# Patient Record
Sex: FEMALE | Race: WHITE | NOT HISPANIC OR LATINO | Employment: FULL TIME | URBAN - METROPOLITAN AREA
[De-identification: names, ages, dates, MRNs, and addresses within clinical notes are randomized per-mention and may not be internally consistent; named-entity substitution may affect disease eponyms.]

---

## 2017-04-21 ENCOUNTER — HISTORICAL (OUTPATIENT)
Dept: ADMINISTRATIVE | Facility: HOSPITAL | Age: 42
End: 2017-04-21

## 2017-05-23 RX ORDER — METFORMIN HYDROCHLORIDE 500 MG/1
TABLET, EXTENDED RELEASE ORAL
Qty: 60 TABLET | Refills: 2 | Status: SHIPPED | OUTPATIENT
Start: 2017-05-23 | End: 2017-07-26

## 2017-05-23 RX ORDER — FENOFIBRATE 160 MG/1
1 TABLET ORAL DAILY
COMMUNITY
Start: 2017-04-12 | End: 2017-07-26 | Stop reason: SDUPTHER

## 2017-05-23 RX ORDER — LEVOTHYROXINE SODIUM 100 UG/1
1 TABLET ORAL DAILY
COMMUNITY
Start: 2017-04-12 | End: 2017-06-27 | Stop reason: SDUPTHER

## 2017-05-23 RX ORDER — ATORVASTATIN CALCIUM 20 MG/1
1 TABLET, FILM COATED ORAL DAILY
COMMUNITY
Start: 2017-04-12 | End: 2017-06-27 | Stop reason: SDUPTHER

## 2017-05-23 RX ORDER — LISINOPRIL 10 MG/1
1 TABLET ORAL DAILY
COMMUNITY
Start: 2017-04-12 | End: 2017-07-26 | Stop reason: SDUPTHER

## 2017-05-23 RX ORDER — ESCITALOPRAM OXALATE 10 MG/1
1 TABLET ORAL DAILY
COMMUNITY
Start: 2017-04-12 | End: 2017-06-27 | Stop reason: SDUPTHER

## 2017-06-27 RX ORDER — ATORVASTATIN CALCIUM 20 MG/1
TABLET, FILM COATED ORAL
Qty: 30 TABLET | Refills: 2 | Status: SHIPPED | OUTPATIENT
Start: 2017-06-27 | End: 2017-07-26 | Stop reason: SDUPTHER

## 2017-06-27 RX ORDER — ESCITALOPRAM OXALATE 10 MG/1
TABLET ORAL
Qty: 30 TABLET | Refills: 2 | Status: SHIPPED | OUTPATIENT
Start: 2017-06-27 | End: 2017-07-26 | Stop reason: SDUPTHER

## 2017-06-27 RX ORDER — LEVOTHYROXINE SODIUM 100 UG/1
TABLET ORAL
Qty: 30 TABLET | Refills: 2 | Status: SHIPPED | OUTPATIENT
Start: 2017-06-27 | End: 2017-07-26 | Stop reason: SDUPTHER

## 2017-07-26 ENCOUNTER — OFFICE VISIT (OUTPATIENT)
Dept: FAMILY MEDICINE | Facility: CLINIC | Age: 42
End: 2017-07-26
Payer: MEDICAID

## 2017-07-26 VITALS
SYSTOLIC BLOOD PRESSURE: 130 MMHG | WEIGHT: 198 LBS | HEIGHT: 64 IN | DIASTOLIC BLOOD PRESSURE: 76 MMHG | OXYGEN SATURATION: 96 % | HEART RATE: 81 BPM | BODY MASS INDEX: 33.8 KG/M2

## 2017-07-26 DIAGNOSIS — F41.9 ANXIETY: ICD-10-CM

## 2017-07-26 DIAGNOSIS — E11.9 DIABETES MELLITUS WITHOUT COMPLICATION: Primary | ICD-10-CM

## 2017-07-26 DIAGNOSIS — E83.52 CALCIUM BLOOD INCREASED: ICD-10-CM

## 2017-07-26 DIAGNOSIS — E03.8 SUBCLINICAL HYPOTHYROIDISM: ICD-10-CM

## 2017-07-26 DIAGNOSIS — Z12.39 SCREENING FOR MALIGNANT NEOPLASM OF BREAST: ICD-10-CM

## 2017-07-26 DIAGNOSIS — I10 BENIGN HYPERTENSION: ICD-10-CM

## 2017-07-26 DIAGNOSIS — E78.1 HYPERTRIGLYCERIDEMIA: ICD-10-CM

## 2017-07-26 PROBLEM — R03.0 ELEVATED BLOOD-PRESSURE READING WITHOUT DIAGNOSIS OF HYPERTENSION: Status: ACTIVE | Noted: 2017-07-26

## 2017-07-26 PROBLEM — M72.2 PLANTAR FASCIITIS: Status: ACTIVE | Noted: 2017-07-26

## 2017-07-26 PROBLEM — Q79.2 EXOMPHALOS: Status: ACTIVE | Noted: 2017-07-26

## 2017-07-26 PROBLEM — G47.00 INSOMNIA: Status: ACTIVE | Noted: 2017-07-26

## 2017-07-26 PROBLEM — N60.09 CYST OF BREAST: Status: ACTIVE | Noted: 2017-07-26

## 2017-07-26 PROCEDURE — 4010F ACE/ARB THERAPY RXD/TAKEN: CPT | Mod: ,,, | Performed by: FAMILY MEDICINE

## 2017-07-26 PROCEDURE — 99214 OFFICE O/P EST MOD 30 MIN: CPT | Mod: ,,, | Performed by: FAMILY MEDICINE

## 2017-07-26 RX ORDER — LISINOPRIL 10 MG/1
10 TABLET ORAL DAILY
Qty: 90 TABLET | Refills: 1 | Status: SHIPPED | OUTPATIENT
Start: 2017-07-26 | End: 2018-04-16 | Stop reason: SDUPTHER

## 2017-07-26 RX ORDER — FENOFIBRATE 160 MG/1
160 TABLET ORAL DAILY
Qty: 90 TABLET | Refills: 1 | Status: SHIPPED | OUTPATIENT
Start: 2017-07-26 | End: 2018-04-16 | Stop reason: SDUPTHER

## 2017-07-26 RX ORDER — LEVOTHYROXINE SODIUM 100 UG/1
100 TABLET ORAL DAILY
Qty: 90 TABLET | Refills: 1 | Status: SHIPPED | OUTPATIENT
Start: 2017-07-26 | End: 2018-05-22 | Stop reason: SDUPTHER

## 2017-07-26 RX ORDER — ESCITALOPRAM OXALATE 10 MG/1
10 TABLET ORAL DAILY
Qty: 90 TABLET | Refills: 1 | Status: SHIPPED | OUTPATIENT
Start: 2017-07-26 | End: 2017-11-15

## 2017-07-26 RX ORDER — METFORMIN HYDROCHLORIDE 500 MG/1
500 TABLET ORAL 3 TIMES DAILY
Qty: 270 TABLET | Refills: 1 | Status: SHIPPED | OUTPATIENT
Start: 2017-07-26 | End: 2017-11-15

## 2017-07-26 RX ORDER — ATORVASTATIN CALCIUM 20 MG/1
20 TABLET, FILM COATED ORAL DAILY
Qty: 90 TABLET | Refills: 1 | Status: SHIPPED | OUTPATIENT
Start: 2017-07-26 | End: 2018-08-30 | Stop reason: SDUPTHER

## 2017-07-26 NOTE — PROGRESS NOTES
Subjective:       Patient ID:  Tsering Carlos is a 42 y.o. female with multiple medical diagnoses as listed in the medical history and problem list that presents for Thyroid Problem; Diabetes; and Hypercalcemia  .      Chief Complaint: Thyroid Problem; Diabetes; and Hypercalcemia    Thyroid Problem   Presents for follow-up visit. Patient reports no anxiety, cold intolerance, constipation, diaphoresis, diarrhea, fatigue, heat intolerance, nail problem, palpitations or visual change. The symptoms have been stable.   Diabetes   She presents for her follow-up diabetic visit. She has type 2 diabetes mellitus. Her disease course has been worsening. There are no hypoglycemic associated symptoms. Pertinent negatives for hypoglycemia include no confusion, dizziness, headaches or nervousness/anxiousness. Pertinent negatives for diabetes include no chest pain, no fatigue, no visual change and no weakness. There are no hypoglycemic complications. Symptoms are worsening. There are no diabetic complications. Risk factors for coronary artery disease include diabetes mellitus, dyslipidemia and hypertension. Current diabetic treatment includes oral agent (monotherapy). She is compliant with treatment all of the time. Her weight is stable. She is following a generally healthy diet. Her breakfast blood glucose range is generally 140-180 mg/dl. An ACE inhibitor/angiotensin II receptor blocker is being taken.   Hypertension   This is a chronic problem. The current episode started more than 1 year ago. The problem is unchanged. The problem is controlled. Pertinent negatives include no chest pain, headaches, palpitations or shortness of breath. There are no associated agents to hypertension. Risk factors for coronary artery disease include diabetes mellitus and dyslipidemia. Past treatments include ACE inhibitors. The current treatment provides mild improvement. Hypertensive end-organ damage includes a thyroid problem.     Review of  Systems   Constitutional: Negative for appetite change, chills, diaphoresis, fatigue and fever.   HENT: Negative for congestion, ear pain, hearing loss, sore throat and trouble swallowing.    Eyes: Negative for photophobia, pain and visual disturbance.   Respiratory: Negative for cough, chest tightness and shortness of breath.    Cardiovascular: Negative for chest pain, palpitations and leg swelling.   Gastrointestinal: Negative for abdominal pain, blood in stool, constipation, diarrhea, nausea and vomiting.   Endocrine: Negative for cold intolerance and heat intolerance.   Genitourinary: Negative for difficulty urinating, flank pain, pelvic pain and vaginal pain.   Musculoskeletal: Negative for arthralgias and myalgias.   Skin: Negative for rash.   Allergic/Immunologic: Negative for immunocompromised state.   Neurological: Negative for dizziness, weakness, light-headedness and headaches.   Hematological: Negative for adenopathy. Does not bruise/bleed easily.   Psychiatric/Behavioral: Negative for confusion, self-injury and suicidal ideas. The patient is not nervous/anxious.        Past Medical History:   Diagnosis Date    Anxiety     Breast cyst     4 mm cyst according to mammo, repeat test 5/2016    Diabetes mellitus, type 2     High calcium levels     Hypertension     Hypertriglyceridemia     Hypothyroidism     Umbilical hernia     since 2009, worse after giving birth     Past Surgical History:   Procedure Laterality Date    ADENOIDECTOMY  1985    HERNIA REPAIR  03/21/2016    INSERTION OF CONTRACEPTIVE CAPSULE  2012    TONSILLECTOMY  1985    TYMPANOSTOMY TUBE PLACEMENT  1985     Family History   Problem Relation Age of Onset    Hypertension Mother     Stroke Mother     Heart disease Mother     Diabetes Mother     Diabetes Father     Stroke Father     Heart disease Father     Depression Father      Social History     Social History    Marital status:      Spouse name: N/A    Number  of children: N/A    Years of education: N/A     Occupational History    business owner      Social History Main Topics    Smoking status: Never Smoker    Smokeless tobacco: Never Used    Alcohol use No    Drug use: No    Sexual activity: Not Asked     Other Topics Concern    None     Social History Narrative    None     Current Outpatient Prescriptions   Medication Sig Dispense Refill    atorvastatin (LIPITOR) 20 MG tablet Take 1 tablet (20 mg total) by mouth once daily. 90 tablet 1    escitalopram oxalate (LEXAPRO) 10 MG tablet Take 1 tablet (10 mg total) by mouth once daily. 90 tablet 1    fenofibrate 160 MG Tab Take 1 tablet (160 mg total) by mouth once daily. 90 tablet 1    levothyroxine (SYNTHROID) 100 MCG tablet Take 1 tablet (100 mcg total) by mouth once daily. 90 tablet 1    lisinopril 10 MG tablet Take 1 tablet (10 mg total) by mouth once daily. 90 tablet 1    metformin (GLUCOPHAGE) 500 MG tablet Take 1 tablet (500 mg total) by mouth 3 (three) times daily. 270 tablet 1     No current facility-administered medications for this visit.      Review of patient's allergies indicates:  No Known Allergies  Objective:      Vitals:    07/26/17 0932   BP: 130/76   Pulse: 81     Physical Exam   Constitutional: She appears well-developed and well-nourished.   HENT:   Head: Normocephalic.   Eyes: Pupils are equal, round, and reactive to light. Right eye exhibits no discharge. Left eye exhibits no discharge. No scleral icterus.   Neck: No thyromegaly present.   Cardiovascular: Normal rate and regular rhythm.  Exam reveals no gallop and no friction rub.    No murmur heard.  Pulmonary/Chest: No respiratory distress. She has no wheezes. She has no rales. She exhibits no tenderness.   Abdominal: She exhibits no distension. There is no tenderness.   Musculoskeletal: She exhibits no tenderness.   Skin: No erythema.   Psychiatric: She has a normal mood and affect.       Assessment:       1. Diabetes mellitus  without complication    2. Subclinical hypothyroidism    3. Hypertriglyceridemia    4. Anxiety    5. Benign hypertension    6. Screening for malignant neoplasm of breast    7. Calcium blood increased        Plan:       Diabetes mellitus without complication  Comments:  ha1c 7.5. change short-acting metformin 2/2 undigestion. consider add MCKEON or Dpp4 inhibitor if HA1c> 7 next visit.   Orders:  -     metformin (GLUCOPHAGE) 500 MG tablet; Take 1 tablet (500 mg total) by mouth 3 (three) times daily.  Dispense: 270 tablet; Refill: 1  -     Hemoglobin A1c; Future; Expected date: 07/26/2017    Subclinical hypothyroidism  Comments:  TSH wnl. U/s of thyroid wnl.  TPO wnl. continue same dosage.   Orders:  -     levothyroxine (SYNTHROID) 100 MCG tablet; Take 1 tablet (100 mcg total) by mouth once daily.  Dispense: 90 tablet; Refill: 1  -     Lipid panel; Future; Expected date: 07/26/2017    Hypertriglyceridemia  Comments:  on statin and trico.   Orders:  -     fenofibrate 160 MG Tab; Take 1 tablet (160 mg total) by mouth once daily.  Dispense: 90 tablet; Refill: 1  -     Lipid panel; Future; Expected date: 07/26/2017  -     atorvastatin (LIPITOR) 20 MG tablet; Take 1 tablet (20 mg total) by mouth once daily.  Dispense: 90 tablet; Refill: 1    Anxiety  -     escitalopram oxalate (LEXAPRO) 10 MG tablet; Take 1 tablet (10 mg total) by mouth once daily.  Dispense: 90 tablet; Refill: 1    Benign hypertension  -     lisinopril 10 MG tablet; Take 1 tablet (10 mg total) by mouth once daily.  Dispense: 90 tablet; Refill: 1    Screening for malignant neoplasm of breast  -     Mammo Digital Screening Bilat without CA; Future; Expected date: 07/26/2017    Calcium blood increased  Comments:  PTH wnl   Orders:  -     Calcium; Future; Expected date: 07/26/2017      Return in about 3 months (around 10/26/2017) for DM, HTN, HLD, lab review.      7/26/2017 Sofie Graff M.D.

## 2017-08-06 ENCOUNTER — TELEPHONE (OUTPATIENT)
Dept: FAMILY MEDICINE | Facility: CLINIC | Age: 42
End: 2017-08-06

## 2017-11-15 ENCOUNTER — OFFICE VISIT (OUTPATIENT)
Dept: FAMILY MEDICINE | Facility: CLINIC | Age: 42
End: 2017-11-15
Payer: MEDICAID

## 2017-11-15 VITALS
WEIGHT: 199.81 LBS | DIASTOLIC BLOOD PRESSURE: 69 MMHG | RESPIRATION RATE: 18 BRPM | BODY MASS INDEX: 34.11 KG/M2 | HEART RATE: 76 BPM | HEIGHT: 64 IN | SYSTOLIC BLOOD PRESSURE: 115 MMHG | OXYGEN SATURATION: 99 %

## 2017-11-15 DIAGNOSIS — F41.9 ANXIETY: ICD-10-CM

## 2017-11-15 DIAGNOSIS — E11.9 DIABETES MELLITUS WITHOUT COMPLICATION: Primary | ICD-10-CM

## 2017-11-15 DIAGNOSIS — E83.52 CALCIUM BLOOD INCREASED: ICD-10-CM

## 2017-11-15 DIAGNOSIS — T83.9XXD COMPLICATION OF INTRAUTERINE DEVICE (IUD), UNSPECIFIED COMPLICATION, SUBSEQUENT ENCOUNTER: ICD-10-CM

## 2017-11-15 DIAGNOSIS — I10 BENIGN HYPERTENSION: ICD-10-CM

## 2017-11-15 DIAGNOSIS — E78.1 HYPERTRIGLYCERIDEMIA: ICD-10-CM

## 2017-11-15 DIAGNOSIS — Z12.31 SCREENING MAMMOGRAM, ENCOUNTER FOR: ICD-10-CM

## 2017-11-15 DIAGNOSIS — E03.8 SUBCLINICAL HYPOTHYROIDISM: ICD-10-CM

## 2017-11-15 DIAGNOSIS — B37.2 CANDIDAL INTERTRIGO: ICD-10-CM

## 2017-11-15 PROBLEM — T83.9XXA IUD COMPLICATION: Status: ACTIVE | Noted: 2017-11-15

## 2017-11-15 PROBLEM — R03.0 ELEVATED BLOOD-PRESSURE READING WITHOUT DIAGNOSIS OF HYPERTENSION: Status: RESOLVED | Noted: 2017-07-26 | Resolved: 2017-11-15

## 2017-11-15 LAB — HBA1C MFR BLD: 7.2 %

## 2017-11-15 PROCEDURE — 83036 HEMOGLOBIN GLYCOSYLATED A1C: CPT | Mod: ,,, | Performed by: INTERNAL MEDICINE

## 2017-11-15 PROCEDURE — 99214 OFFICE O/P EST MOD 30 MIN: CPT | Mod: ,,, | Performed by: INTERNAL MEDICINE

## 2017-11-15 RX ORDER — FLUCONAZOLE 150 MG/1
150 TABLET ORAL WEEKLY
Qty: 6 TABLET | Refills: 0 | Status: SHIPPED | OUTPATIENT
Start: 2017-11-15 | End: 2017-12-21

## 2017-11-15 RX ORDER — BUPROPION HYDROCHLORIDE 150 MG/1
150 TABLET ORAL DAILY
Qty: 30 TABLET | Refills: 1 | Status: SHIPPED | OUTPATIENT
Start: 2017-11-15 | End: 2018-03-05

## 2017-11-15 RX ORDER — METFORMIN HYDROCHLORIDE 500 MG/1
1000 TABLET, FILM COATED, EXTENDED RELEASE ORAL 2 TIMES DAILY WITH MEALS
Qty: 120 TABLET | Refills: 11 | Status: SHIPPED | OUTPATIENT
Start: 2017-11-15 | End: 2017-12-06

## 2017-11-15 NOTE — ASSESSMENT & PLAN NOTE
Pt referred to Gyn for Essure removal. There are case reports of nickel allergy dermatitis with Essure, though the rash she has today does not look like a typical allergic dermatitis rash.

## 2017-11-15 NOTE — PATIENT INSTRUCTIONS
Stop lexapro and start bupropion the next day.    Take diflucan once a week for 6 weeks.    Try melatonin for sleep if you haven't tried it before.

## 2017-11-15 NOTE — PROGRESS NOTES
ADULT FOLLOW-UP VISIT    Subjective:     Chief Complaint:  Follow-up; Diabetes; Hypertension; and Hyperlipidemia      History of Present Illness:   Tsering Carlos is a 42 y.o. female who presents for follow-up of medical issues.     DM- HbA1c 7.2% today. Pt reports she generally follows diabetic diet and takes her metformin 500 mg BID.  She does eat a lot of fruit and doesn't always watch portions of carbs.  Essure- Pt has had light to heavy bleeding since it was placed 6 years ago.  Pt also complains of itchy rash under her breasts and in groin, worse with her period. Pt thinks that it could be 2/2 nickel allergy due to Essur which is nickel plated.  She has seen dermatology for the rash and been given oral and topical steroids which don't seem to help. She has tried OTC anti-fungals as well.          Past medical history, family history and social history are reviewed and there are no significant changes.     ROS:  Review of Systems   Constitutional: Positive for fatigue. Negative for activity change, appetite change and unexpected weight change.   HENT: Negative for congestion, ear pain, rhinorrhea, sinus pain, sinus pressure, sore throat and trouble swallowing.    Eyes: Negative for pain, redness and visual disturbance.   Respiratory: Negative for cough, chest tightness, shortness of breath and wheezing.    Cardiovascular: Negative for chest pain and palpitations.   Gastrointestinal: Negative for abdominal pain, constipation, diarrhea, nausea and vomiting.   Endocrine: Negative for cold intolerance, heat intolerance, polydipsia and polyuria.   Genitourinary: Positive for menstrual problem and vaginal bleeding. Negative for difficulty urinating, dysuria, flank pain, frequency, pelvic pain and vaginal discharge.   Musculoskeletal: Negative for arthralgias and joint swelling.   Skin: Negative for rash.   Allergic/Immunologic: Negative for environmental allergies and food  "allergies.   Neurological: Negative for dizziness, seizures, syncope, weakness and headaches.   Hematological: Negative for adenopathy.   Psychiatric/Behavioral: Positive for dysphoric mood and sleep disturbance. Negative for confusion and suicidal ideas. The patient is not nervous/anxious.           Current Outpatient Prescriptions:     atorvastatin (LIPITOR) 20 MG tablet, Take 1 tablet (20 mg total) by mouth once daily., Disp: 90 tablet, Rfl: 1    fenofibrate 160 MG Tab, Take 1 tablet (160 mg total) by mouth once daily., Disp: 90 tablet, Rfl: 1    levothyroxine (SYNTHROID) 100 MCG tablet, Take 1 tablet (100 mcg total) by mouth once daily., Disp: 90 tablet, Rfl: 1    lisinopril 10 MG tablet, Take 1 tablet (10 mg total) by mouth once daily., Disp: 90 tablet, Rfl: 1    buPROPion (WELLBUTRIN XL) 150 MG TB24 tablet, Take 1 tablet (150 mg total) by mouth once daily., Disp: 30 tablet, Rfl: 1    fluconazole (DIFLUCAN) 150 MG Tab, Take 1 tablet (150 mg total) by mouth once a week., Disp: 6 tablet, Rfl: 0    metFORMIN (GLUMETZA) 500 MG (MOD) 24 hr tablet, Take 2 tablets (1,000 mg total) by mouth 2 (two) times daily with meals., Disp: 120 tablet, Rfl: 11      Objective:     Physical Examination:     /69 (BP Location: Left arm, Patient Position: Sitting, BP Method: Medium (Automatic))   Pulse 76   Resp 18   Ht 5' 4" (1.626 m)   Wt 90.6 kg (199 lb 12.8 oz)   SpO2 99%   BMI 34.30 kg/m²     Physical Exam   Constitutional: She appears well-developed and well-nourished. No distress.   Eyes: Pupils are equal, round, and reactive to light.   Cardiovascular: Regular rhythm, normal heart sounds and intact distal pulses.    No murmur heard.  Pulmonary/Chest: Effort normal and breath sounds normal. She has no wheezes. She has no rales.   Abdominal: Soft. Bowel sounds are normal. She exhibits no distension and no mass. There is no tenderness. There is no guarding.   Musculoskeletal: She exhibits no edema.   Skin: Rash " (hyperpigmented patches under B breasts and B inner thighs) noted.       Assessment/Plan:   Tsering is a 42 y.o. female here for follow-up.    Problem List Items Addressed This Visit        Psychiatric    Anxiety    Current Assessment & Plan     Will try switch from lexapro to wellbutrin, pt to monitor for increased anxiety.         Relevant Medications    buPROPion (WELLBUTRIN XL) 150 MG TB24 tablet       Cardiac/Vascular    Benign hypertension    Current Assessment & Plan     Well controlled on lisinopril 10mg. Check CMP.          Hypertriglyceridemia    Current Assessment & Plan     Recheck of lipids ordered. Currently on lipitor 20mg and tricor 160mg.          Relevant Orders    Lipid panel       Renal/    Calcium blood increased    Overview     3/2017: 10.6, check PTH, give order next visit.         Relevant Orders    Calcium, ionized    IUD complication    Current Assessment & Plan     Pt referred to Gyn for Essure removal. There are case reports of nickel allergy dermatitis with Essure, though the rash she has today does not look like a typical allergic dermatitis rash.         Relevant Orders    Ambulatory referral to Gynecology       ID    Candidal intertrigo    Current Assessment & Plan     Given failure of topical meds, will rx diflucan x 6 weeks.          Relevant Medications    fluconazole (DIFLUCAN) 150 MG Tab       Endocrine    Diabetes mellitus without complication - Primary    Current Assessment & Plan     HbA1c 7.2% today.  Increase metformin to 1000mg BID, discussed dietary changes.          Relevant Medications    metFORMIN (GLUMETZA) 500 MG (MOD) 24 hr tablet    Other Relevant Orders    Hemoglobin A1C, POCT (Completed)    Comprehensive metabolic panel    Subclinical hypothyroidism    Current Assessment & Plan     Continue synthroid 100mcg daily, check TSH.          Relevant Orders    TSH      Other Visit Diagnoses     Screening mammogram, encounter for        Relevant Orders    Mammo Digital  Screening Bilat without CA          Health Maintenance   Topic Date Due    Hemoglobin A1c  1975    Foot Exam  03/22/1985    Eye Exam  03/22/1985    Pneumococcal PPSV23 (Medium Risk) (1) 03/22/1993    Mammogram  05/20/2016    Lipid Panel  03/08/2018    Pap Smear  05/21/2018    TETANUS VACCINE  05/15/2025           Discussion:     Return in about 6 weeks (around 12/27/2017) for f/u medication changes.    Goals     None          Electronically signed by Giuliana Galaviz

## 2017-12-05 ENCOUNTER — TELEPHONE (OUTPATIENT)
Dept: FAMILY MEDICINE | Facility: CLINIC | Age: 42
End: 2017-12-05

## 2017-12-05 DIAGNOSIS — E11.9 DIABETES MELLITUS WITHOUT COMPLICATION: Primary | ICD-10-CM

## 2017-12-06 RX ORDER — METFORMIN HYDROCHLORIDE 1000 MG/1
1000 TABLET, FILM COATED, EXTENDED RELEASE ORAL 2 TIMES DAILY WITH MEALS
Qty: 60 TABLET | Refills: 3 | Status: SHIPPED | OUTPATIENT
Start: 2017-12-06 | End: 2017-12-07

## 2017-12-07 DIAGNOSIS — E11.9 DIABETES MELLITUS WITHOUT COMPLICATION: Primary | ICD-10-CM

## 2017-12-07 RX ORDER — METFORMIN HYDROCHLORIDE 750 MG/1
750 TABLET, EXTENDED RELEASE ORAL 2 TIMES DAILY WITH MEALS
Qty: 60 TABLET | Refills: 11 | Status: SHIPPED | OUTPATIENT
Start: 2017-12-07 | End: 2018-03-05

## 2018-02-28 LAB
ALBUMIN SERPL-MCNC: 4.2 G/DL (ref 3.1–4.7)
ALP SERPL-CCNC: 39 IU/L (ref 40–104)
ALT (SGPT): 16 IU/L (ref 3–33)
AST SERPL-CCNC: 21 IU/L (ref 10–40)
BILIRUB SERPL-MCNC: 0.6 MG/DL (ref 0.3–1)
BUN SERPL-MCNC: 14 MG/DL (ref 8–20)
CALCIUM SERPL-MCNC: 9 MG/DL (ref 7.7–10.4)
CHLORIDE: 101 MMOL/L (ref 98–110)
CO2 SERPL-SCNC: 25.3 MMOL/L (ref 22.8–31.6)
CREATININE: 0.7 MG/DL (ref 0.6–1.4)
GLUCOSE: 162 MG/DL (ref 70–99)
POTASSIUM SERPL-SCNC: 4 MMOL/L (ref 3.5–5)
PROT SERPL-MCNC: 7.4 G/DL (ref 6–8.2)
SODIUM: 133 MMOL/L (ref 134–144)
TSH SERPL DL<=0.005 MIU/L-ACNC: 1.55 ULU/ML (ref 0.3–5.6)

## 2018-03-05 ENCOUNTER — OFFICE VISIT (OUTPATIENT)
Dept: FAMILY MEDICINE | Facility: CLINIC | Age: 43
End: 2018-03-05
Payer: MEDICAID

## 2018-03-05 VITALS
RESPIRATION RATE: 18 BRPM | SYSTOLIC BLOOD PRESSURE: 122 MMHG | HEART RATE: 84 BPM | WEIGHT: 195.63 LBS | DIASTOLIC BLOOD PRESSURE: 85 MMHG | TEMPERATURE: 98 F | OXYGEN SATURATION: 98 % | BODY MASS INDEX: 33.57 KG/M2

## 2018-03-05 DIAGNOSIS — I10 BENIGN HYPERTENSION: ICD-10-CM

## 2018-03-05 DIAGNOSIS — F41.9 ANXIETY: ICD-10-CM

## 2018-03-05 DIAGNOSIS — B37.2 CANDIDAL INTERTRIGO: ICD-10-CM

## 2018-03-05 DIAGNOSIS — E78.1 HYPERTRIGLYCERIDEMIA: ICD-10-CM

## 2018-03-05 DIAGNOSIS — E03.8 SUBCLINICAL HYPOTHYROIDISM: ICD-10-CM

## 2018-03-05 DIAGNOSIS — E11.9 DIABETES MELLITUS WITHOUT COMPLICATION: Primary | ICD-10-CM

## 2018-03-05 PROBLEM — G47.00 INSOMNIA: Status: RESOLVED | Noted: 2017-07-26 | Resolved: 2018-03-05

## 2018-03-05 PROBLEM — E83.52 CALCIUM BLOOD INCREASED: Status: RESOLVED | Noted: 2017-07-26 | Resolved: 2018-03-05

## 2018-03-05 LAB — HBA1C MFR BLD: 6.8 %

## 2018-03-05 PROCEDURE — 83036 HEMOGLOBIN GLYCOSYLATED A1C: CPT | Mod: QW,,, | Performed by: INTERNAL MEDICINE

## 2018-03-05 PROCEDURE — 99213 OFFICE O/P EST LOW 20 MIN: CPT | Mod: ,,, | Performed by: INTERNAL MEDICINE

## 2018-03-05 RX ORDER — NYSTATIN 100000 [USP'U]/G
POWDER TOPICAL 2 TIMES DAILY
Qty: 60 G | Refills: 2 | Status: SHIPPED | OUTPATIENT
Start: 2018-03-05 | End: 2022-01-12

## 2018-03-05 RX ORDER — ESCITALOPRAM OXALATE 10 MG/1
10 TABLET ORAL DAILY
COMMUNITY
End: 2018-08-30 | Stop reason: SDUPTHER

## 2018-03-05 RX ORDER — METFORMIN HYDROCHLORIDE 500 MG/1
500 TABLET ORAL 2 TIMES DAILY WITH MEALS
COMMUNITY
End: 2019-12-18

## 2018-03-05 NOTE — PATIENT INSTRUCTIONS
Try taking metformin 1000mg with breakfast and dinner. If you tolerate that, let me know and I can send in a prescription for 1000mg tablets.

## 2018-03-05 NOTE — PROGRESS NOTES
"                                    ADULT FOLLOW-UP VISIT    Subjective:     Chief Complaint:  Follow-up and Hospital Follow Up      History of Present Illness:   Tsering Carlos is a 42 y.o. female who presents for follow-up of medical issues.     DM- Pt took metformin XR for a month or so but kept seeing it "in the toilet" unchanged. She went back to taking metformin 500mg tabs, two with dinner.  She has been trying to watch her diet and her blood sugars have been in range.    Anxiety- Pt did not tolerate change to bupropion and went back to lexapro, feels it works better overall.  Rash- Rash under breasts, pannus and in groin resolved after 6 weeks of diflucan. When she has her period she notes recurrence of rash/itching in her groin area.   Ovarian cyst- Recently hospitalized at [facility] for severe RLQ pain, thought to be 2/2 ovarian cyst rupture.  Has upcoming f/u with Dr. Philip.  Imaging showed essure in place.    Past medical history, family history and social history are reviewed and there are no significant changes.     ROS:  Review of Systems   Gastrointestinal: Negative for constipation, diarrhea, nausea and vomiting.   Genitourinary: Positive for pelvic pain and vaginal bleeding.   Skin: Positive for rash.          Current Outpatient Prescriptions:     atorvastatin (LIPITOR) 20 MG tablet, Take 1 tablet (20 mg total) by mouth once daily., Disp: 90 tablet, Rfl: 1    escitalopram oxalate (LEXAPRO) 10 MG tablet, Take 10 mg by mouth once daily., Disp: , Rfl:     fenofibrate 160 MG Tab, Take 1 tablet (160 mg total) by mouth once daily., Disp: 90 tablet, Rfl: 1    levothyroxine (SYNTHROID) 100 MCG tablet, Take 1 tablet (100 mcg total) by mouth once daily., Disp: 90 tablet, Rfl: 1    lisinopril 10 MG tablet, Take 1 tablet (10 mg total) by mouth once daily., Disp: 90 tablet, Rfl: 1    metFORMIN (GLUCOPHAGE) 500 MG tablet, Take 500 mg by mouth 2 (two) times daily with meals., Disp: , Rfl:     nystatin " (MYCOSTATIN) powder, Apply topically 2 (two) times daily., Disp: 60 g, Rfl: 2      Objective:     Physical Examination:     /85   Pulse 84   Temp 98.2 °F (36.8 °C) (Oral)   Resp 18   Wt 88.7 kg (195 lb 9.6 oz)   LMP 02/10/2018 (Exact Date)   SpO2 98%   BMI 33.57 kg/m²     Physical Exam   Constitutional: She appears well-developed and well-nourished. No distress.   Eyes: Pupils are equal, round, and reactive to light.   Cardiovascular: Regular rhythm, normal heart sounds and intact distal pulses.    No murmur heard.  Pulses:       Dorsalis pedis pulses are 2+ on the right side, and 2+ on the left side.        Posterior tibial pulses are 2+ on the right side, and 2+ on the left side.   Pulmonary/Chest: Effort normal and breath sounds normal. She has no wheezes. She has no rales.   Musculoskeletal: She exhibits no edema.        Right foot: There is normal range of motion and no deformity.        Left foot: There is normal range of motion and no deformity.   Feet:   Right Foot:   Protective Sensation: 10 sites tested. 10 sites sensed.   Skin Integrity: Negative for ulcer, blister, skin breakdown, callus or dry skin.   Left Foot:   Protective Sensation: 10 sites tested. 10 sites sensed.   Skin Integrity: Negative for ulcer, blister, skin breakdown, callus or dry skin.   Skin:   Mild residual hyperpigmentation under breasts and pannus       Assessment/Plan:   Tsering is a 42 y.o. female here for follow-up.    Problem List Items Addressed This Visit        Psychiatric    Anxiety    Current Assessment & Plan     Continue lexapro (wellbutrin caused increased anxiety).             Cardiac/Vascular    Benign hypertension    Current Assessment & Plan     Well controlled on lisinopril 10mg.            Hypertriglyceridemia    Current Assessment & Plan     Continue on lipitor 20mg and tricor 160mg.  Total cholesterol WNL, TG >400.             ID    Candidal intertrigo    Current Assessment & Plan     Improved after  fluconazole. Gave rx for nystatin powder to use at first signs of rash. Pt will d/w OBGYN the possibility of diflucan with each period.         Relevant Medications    nystatin (MYCOSTATIN) powder       Endocrine    Diabetes mellitus without complication - Primary    Current Assessment & Plan     HbA1c 6.8% today.  Increase metformin to 1000mg BID, discussed dietary changes.          Relevant Orders    Hemoglobin A1C, POCT (Completed)    Subclinical hypothyroidism    Current Assessment & Plan     Continue synthroid 100mcg daily. TSH normal 2/2018.               Health Maintenance   Topic Date Due    Foot Exam  03/22/1985    Eye Exam  03/22/1985    Pneumococcal PPSV23 (Medium Risk) (1) 03/22/1993    Hemoglobin A1c  05/15/2018    Pap Smear  05/21/2018    Mammogram  02/28/2019    Lipid Panel  02/28/2019    TETANUS VACCINE  05/15/2025           Discussion:     Follow-up in about 6 months (around 9/5/2018) for diabetes, HTN.    Goals     None          Electronically signed by Giuliana Galaviz

## 2018-04-16 DIAGNOSIS — E78.1 HYPERTRIGLYCERIDEMIA: ICD-10-CM

## 2018-04-16 DIAGNOSIS — I10 BENIGN HYPERTENSION: ICD-10-CM

## 2018-04-16 RX ORDER — LISINOPRIL 10 MG/1
TABLET ORAL
Qty: 90 TABLET | Refills: 1 | Status: SHIPPED | OUTPATIENT
Start: 2018-04-16 | End: 2019-01-08 | Stop reason: SDUPTHER

## 2018-04-16 RX ORDER — FENOFIBRATE 160 MG/1
TABLET ORAL
Qty: 90 TABLET | Refills: 1 | Status: SHIPPED | OUTPATIENT
Start: 2018-04-16 | End: 2019-01-08 | Stop reason: SDUPTHER

## 2018-05-22 ENCOUNTER — TELEPHONE (OUTPATIENT)
Dept: FAMILY MEDICINE | Facility: CLINIC | Age: 43
End: 2018-05-22

## 2018-05-22 DIAGNOSIS — F41.9 ANXIETY: Primary | ICD-10-CM

## 2018-05-22 DIAGNOSIS — E03.8 SUBCLINICAL HYPOTHYROIDISM: ICD-10-CM

## 2018-05-22 RX ORDER — LEVOTHYROXINE SODIUM 100 UG/1
100 TABLET ORAL DAILY
Qty: 90 TABLET | Refills: 1 | Status: SHIPPED | OUTPATIENT
Start: 2018-05-22 | End: 2018-08-31 | Stop reason: SDUPTHER

## 2018-05-22 RX ORDER — HYDROXYZINE HYDROCHLORIDE 25 MG/1
25 TABLET, FILM COATED ORAL 3 TIMES DAILY PRN
Qty: 30 TABLET | Refills: 1 | Status: SHIPPED | OUTPATIENT
Start: 2018-05-22 | End: 2020-07-23

## 2018-05-22 NOTE — TELEPHONE ENCOUNTER
Pt is having difficulty sleeping since her moms heart attack on mothers day, left a msg requesting something to help her sleep or the anxiety she is feeling about the whole situation.

## 2018-07-23 DIAGNOSIS — E11.9 DIABETES MELLITUS WITHOUT COMPLICATION: ICD-10-CM

## 2018-07-23 RX ORDER — METFORMIN HYDROCHLORIDE 500 MG/1
TABLET ORAL
Qty: 270 TABLET | Refills: 1 | Status: SHIPPED | OUTPATIENT
Start: 2018-07-23 | End: 2019-01-24

## 2018-08-30 DIAGNOSIS — E78.1 HYPERTRIGLYCERIDEMIA: ICD-10-CM

## 2018-08-30 RX ORDER — ESCITALOPRAM OXALATE 10 MG/1
10 TABLET ORAL DAILY
Qty: 90 TABLET | Refills: 3 | Status: SHIPPED | OUTPATIENT
Start: 2018-08-30 | End: 2019-12-18 | Stop reason: SDUPTHER

## 2018-08-30 RX ORDER — ATORVASTATIN CALCIUM 20 MG/1
20 TABLET, FILM COATED ORAL DAILY
Qty: 90 TABLET | Refills: 3 | Status: SHIPPED | OUTPATIENT
Start: 2018-08-30 | End: 2019-12-18 | Stop reason: SDUPTHER

## 2018-08-31 DIAGNOSIS — E03.8 SUBCLINICAL HYPOTHYROIDISM: ICD-10-CM

## 2018-08-31 RX ORDER — LEVOTHYROXINE SODIUM 100 UG/1
100 TABLET ORAL DAILY
Qty: 90 TABLET | Refills: 1 | Status: SHIPPED | OUTPATIENT
Start: 2018-08-31 | End: 2019-04-03 | Stop reason: SDUPTHER

## 2019-01-08 DIAGNOSIS — I10 BENIGN HYPERTENSION: ICD-10-CM

## 2019-01-08 DIAGNOSIS — E78.1 HYPERTRIGLYCERIDEMIA: ICD-10-CM

## 2019-01-08 RX ORDER — LISINOPRIL 10 MG/1
TABLET ORAL
Qty: 90 TABLET | Refills: 1 | Status: SHIPPED | OUTPATIENT
Start: 2019-01-08 | End: 2019-10-08 | Stop reason: SDUPTHER

## 2019-01-08 RX ORDER — FENOFIBRATE 160 MG/1
TABLET ORAL
Qty: 90 TABLET | Refills: 1 | Status: SHIPPED | OUTPATIENT
Start: 2019-01-08 | End: 2019-12-18 | Stop reason: SDUPTHER

## 2019-01-24 ENCOUNTER — OFFICE VISIT (OUTPATIENT)
Dept: FAMILY MEDICINE | Facility: CLINIC | Age: 44
End: 2019-01-24
Payer: MEDICAID

## 2019-01-24 VITALS
SYSTOLIC BLOOD PRESSURE: 108 MMHG | HEART RATE: 88 BPM | BODY MASS INDEX: 33.89 KG/M2 | OXYGEN SATURATION: 98 % | RESPIRATION RATE: 20 BRPM | HEIGHT: 64 IN | WEIGHT: 198.5 LBS | DIASTOLIC BLOOD PRESSURE: 78 MMHG

## 2019-01-24 DIAGNOSIS — E78.1 HYPERTRIGLYCERIDEMIA: ICD-10-CM

## 2019-01-24 DIAGNOSIS — I10 BENIGN HYPERTENSION: ICD-10-CM

## 2019-01-24 DIAGNOSIS — E11.9 DIABETES MELLITUS WITHOUT COMPLICATION: Primary | ICD-10-CM

## 2019-01-24 DIAGNOSIS — E03.8 SUBCLINICAL HYPOTHYROIDISM: ICD-10-CM

## 2019-01-24 DIAGNOSIS — Z12.31 SCREENING MAMMOGRAM, ENCOUNTER FOR: ICD-10-CM

## 2019-01-24 LAB — HBA1C MFR BLD: 8.4 %

## 2019-01-24 PROCEDURE — 83036 HEMOGLOBIN GLYCOSYLATED A1C: CPT | Mod: QW,,, | Performed by: INTERNAL MEDICINE

## 2019-01-24 PROCEDURE — 99213 OFFICE O/P EST LOW 20 MIN: CPT | Mod: ,,, | Performed by: INTERNAL MEDICINE

## 2019-01-24 PROCEDURE — 99213 PR OFFICE/OUTPT VISIT, EST, LEVL III, 20-29 MIN: ICD-10-PCS | Mod: ,,, | Performed by: INTERNAL MEDICINE

## 2019-01-24 PROCEDURE — 83036 POCT HEMOGLOBIN A1C: ICD-10-PCS | Mod: QW,,, | Performed by: INTERNAL MEDICINE

## 2019-01-24 NOTE — PROGRESS NOTES
ADULT FOLLOW-UP VISIT    Subjective:     Chief Complaint:  Diabetes      42 yo woman here for f/u of diabetes. Last seen 3/2018.  Reports compliance with medications and diabetic diet, but HbA1c is up to 8.3% today, 6.8% at last check. Pt reports she has been drinking more juice and eating bananas. Otherwise she avoids added sugar and most carbs in her diet.  She is frustrated about inability to lose weight. Currently she is on metformin only, unable to tolerate more than 500mg dose.       Diabetes   Pertinent negatives for diabetes include no blurred vision, no chest pain, no fatigue, no foot paresthesias, no foot ulcerations, no polydipsia, no polyphagia, no polyuria, no visual change, no weakness and no weight loss.         Ms. Carlos  has a past medical history of Anxiety, Breast cyst, Diabetes mellitus, type 2, High calcium levels, Hypertension, Hypertriglyceridemia, Hypothyroidism, and Umbilical hernia.    Family history and social history are reviewed and there are no significant changes.     ROS:  Review of Systems   Constitutional: Negative for fatigue and weight loss.   Eyes: Negative for blurred vision.   Cardiovascular: Negative for chest pain.   Endocrine: Negative for polydipsia, polyphagia and polyuria.   Neurological: Negative for weakness.          Current Outpatient Medications:     atorvastatin (LIPITOR) 20 MG tablet, Take 1 tablet (20 mg total) by mouth once daily., Disp: 90 tablet, Rfl: 3    escitalopram oxalate (LEXAPRO) 10 MG tablet, Take 1 tablet (10 mg total) by mouth once daily., Disp: 90 tablet, Rfl: 3    fenofibrate 160 MG Tab, TAKE ONE TABLET BY MOUTH EVERY DAY, Disp: 90 tablet, Rfl: 1    hydrOXYzine HCl (ATARAX) 25 MG tablet, Take 1 tablet (25 mg total) by mouth 3 (three) times daily as needed for Anxiety (or insomnia)., Disp: 30 tablet, Rfl: 1    levothyroxine (SYNTHROID) 100 MCG tablet, Take 1 tablet (100 mcg total) by mouth once daily.,  "Disp: 90 tablet, Rfl: 1    lisinopril 10 MG tablet, TAKE ONE TABLET BY MOUTH EVERY DAY, Disp: 90 tablet, Rfl: 1    metFORMIN (GLUCOPHAGE) 500 MG tablet, Take 500 mg by mouth 2 (two) times daily with meals., Disp: , Rfl:     dulaglutide (TRULICITY) 0.75 mg/0.5 mL PnIj, Inject 0.5 mLs (0.75 mg total) into the skin every 7 days., Disp: 4 Syringe, Rfl: 3    nystatin (MYCOSTATIN) powder, Apply topically 2 (two) times daily., Disp: 60 g, Rfl: 2      Objective:     Physical Examination:     /78   Pulse 88   Resp 20   Ht 5' 4" (1.626 m)   Wt 90 kg (198 lb 8 oz)   LMP 01/16/2019   SpO2 98%   BMI 34.07 kg/m²     Physical Exam   Constitutional: She appears well-developed and well-nourished. No distress.   HENT:   Head: Normocephalic and atraumatic.   Nose: Nose normal.   Mouth/Throat: Oropharynx is clear and moist and mucous membranes are normal.   Eyes: Conjunctivae are normal. Pupils are equal, round, and reactive to light.   Cardiovascular: Normal rate, regular rhythm, normal heart sounds and intact distal pulses.   No murmur heard.  Pulses:       Dorsalis pedis pulses are 2+ on the right side, and 2+ on the left side.        Posterior tibial pulses are 2+ on the right side, and 2+ on the left side.   Pulmonary/Chest: Effort normal and breath sounds normal. She has no wheezes. She has no rales.   Musculoskeletal: She exhibits no edema.        Right foot: There is normal range of motion and no deformity.        Left foot: There is normal range of motion and no deformity.   Feet:   Right Foot:   Protective Sensation: 10 sites tested. 10 sites sensed.   Skin Integrity: Negative for ulcer, blister, skin breakdown, erythema, warmth, callus or dry skin.   Left Foot:   Protective Sensation: 10 sites tested. 10 sites sensed.   Skin Integrity: Negative for ulcer, blister, skin breakdown, erythema, warmth, callus or dry skin.   Skin: She is not diaphoretic.       Assessment/Plan:   Tsering is a 43 y.o. female here " for follow-up.    Problem List Items Addressed This Visit        Cardiac/Vascular    Benign hypertension    Current Assessment & Plan     Well controlled on lisinopril 10mg. Check CMP.            Relevant Orders    Comprehensive metabolic panel    Hypertriglyceridemia    Current Assessment & Plan     Continue on lipitor 20mg and tricor 160mg.  Total cholesterol WNL, TG >400. Recheck lipids.          Relevant Orders    Lipid panel       Endocrine    Diabetes mellitus without complication - Primary    Current Assessment & Plan     HbA1c up to 8.3% today. Continue metformin 500mg BID, add trulicity.  RTC in 1-2 months for annual physical, will increase trulicity dose if tolerated.          Relevant Medications    dulaglutide (TRULICITY) 0.75 mg/0.5 mL PnIj    Other Relevant Orders    Hemoglobin A1C, POCT (Completed)    Comprehensive metabolic panel    POCT microalbumin    Subclinical hypothyroidism    Current Assessment & Plan     Continue synthroid 100mcg daily. TSH normal 2/2018. Recheck with upcoming labs.          Relevant Orders    TSH      Other Visit Diagnoses     Screening mammogram, encounter for        Relevant Orders    Mammo Digital Diagnostic Bilat with Reginaldo          [facility] Maintenance   Topic Date Due    Eye Exam  03/22/1985    Pneumococcal Vaccine (Medium Risk) (1 of 1 - PPSV23) 03/22/1994    Pap Smear  05/21/2018    Mammogram  02/28/2019    Foot Exam  03/05/2019    Lipid Panel  02/28/2019    Hemoglobin A1c  07/24/2019    TETANUS VACCINE  05/15/2025           Discussion:     Follow-up in about 2 months (around 3/24/2019) for annual physical.    Goals     None          Electronically signed by Giuliana Galaviz

## 2019-01-24 NOTE — ASSESSMENT & PLAN NOTE
HbA1c up to 8.3% today. Continue metformin 500mg BID, add trulicity.  RTC in 1-2 months for annual physical, will increase trulicity dose if tolerated.

## 2019-04-03 DIAGNOSIS — E03.8 SUBCLINICAL HYPOTHYROIDISM: ICD-10-CM

## 2019-04-03 RX ORDER — LEVOTHYROXINE SODIUM 100 UG/1
TABLET ORAL
Qty: 30 TABLET | Refills: 1 | Status: SHIPPED | OUTPATIENT
Start: 2019-04-03 | End: 2019-06-12 | Stop reason: SDUPTHER

## 2019-04-11 LAB
ALBUMIN SERPL-MCNC: 4.5 G/DL (ref 3.1–4.7)
ALP SERPL-CCNC: 28 IU/L (ref 40–104)
ALT (SGPT): 18 IU/L (ref 3–33)
AST SERPL-CCNC: 20 IU/L (ref 10–40)
BILIRUB SERPL-MCNC: 0.6 MG/DL (ref 0.3–1)
BUN SERPL-MCNC: 14 MG/DL (ref 8–20)
CALCIUM SERPL-MCNC: 10 MG/DL (ref 7.7–10.4)
CHLORIDE: 102 MMOL/L (ref 98–110)
CO2 SERPL-SCNC: 25.4 MMOL/L (ref 22.8–31.6)
CREATININE: 0.67 MG/DL (ref 0.6–1.4)
GLUCOSE: 135 MG/DL (ref 70–99)
POTASSIUM SERPL-SCNC: 4.3 MMOL/L (ref 3.5–5)
PROT SERPL-MCNC: 7.9 G/DL (ref 6–8.2)
SODIUM: 141 MMOL/L (ref 134–144)
TSH SERPL DL<=0.005 MIU/L-ACNC: 3.47 ULU/ML (ref 0.3–5.6)

## 2019-04-15 ENCOUNTER — OFFICE VISIT (OUTPATIENT)
Dept: FAMILY MEDICINE | Facility: CLINIC | Age: 44
End: 2019-04-15
Payer: MEDICAID

## 2019-04-15 VITALS
HEART RATE: 85 BPM | OXYGEN SATURATION: 98 % | RESPIRATION RATE: 20 BRPM | SYSTOLIC BLOOD PRESSURE: 102 MMHG | HEIGHT: 64 IN | BODY MASS INDEX: 33.02 KG/M2 | WEIGHT: 193.38 LBS | DIASTOLIC BLOOD PRESSURE: 72 MMHG

## 2019-04-15 DIAGNOSIS — G47.9 SLEEP DISTURBANCE: ICD-10-CM

## 2019-04-15 DIAGNOSIS — Z00.00 ANNUAL PHYSICAL EXAM: Primary | ICD-10-CM

## 2019-04-15 DIAGNOSIS — I10 BENIGN HYPERTENSION: ICD-10-CM

## 2019-04-15 DIAGNOSIS — E78.1 HYPERTRIGLYCERIDEMIA: ICD-10-CM

## 2019-04-15 DIAGNOSIS — E03.8 SUBCLINICAL HYPOTHYROIDISM: ICD-10-CM

## 2019-04-15 DIAGNOSIS — F41.9 ANXIETY: ICD-10-CM

## 2019-04-15 DIAGNOSIS — E11.9 DIABETES MELLITUS WITHOUT COMPLICATION: ICD-10-CM

## 2019-04-15 PROCEDURE — 90471 IMMUNIZATION ADMIN: CPT | Mod: ,,, | Performed by: INTERNAL MEDICINE

## 2019-04-15 PROCEDURE — 99396 PR PREVENTIVE VISIT,EST,40-64: ICD-10-PCS | Mod: 25,,, | Performed by: INTERNAL MEDICINE

## 2019-04-15 PROCEDURE — 90732 PNEUMOCOCCAL POLYSACCHARIDE VACCINE 23-VALENT =>2YO SQ IM: ICD-10-PCS | Mod: ,,, | Performed by: INTERNAL MEDICINE

## 2019-04-15 PROCEDURE — 90732 PPSV23 VACC 2 YRS+ SUBQ/IM: CPT | Mod: ,,, | Performed by: INTERNAL MEDICINE

## 2019-04-15 PROCEDURE — 99396 PREV VISIT EST AGE 40-64: CPT | Mod: 25,,, | Performed by: INTERNAL MEDICINE

## 2019-04-15 PROCEDURE — 90471 PNEUMOCOCCAL POLYSACCHARIDE VACCINE 23-VALENT =>2YO SQ IM: ICD-10-PCS | Mod: ,,, | Performed by: INTERNAL MEDICINE

## 2019-04-15 NOTE — PROGRESS NOTES
ADULT ANNUAL VISIT    Subjective:     Chief Complaint:  Follow-up      History of Present Illness:   Tsering Carlos is a 44 y.o. female who presents for their annual well visit today.  Pt c/o overall fatigue. Sleeps 7 hours but does not feel refreshed. Has always been a light sleep and a night owl. Stays up until 2 or 3 am, gets up at 6am to get kids ready for school, goes back to bed for a few hours.  Has some daytime sleepiness, difficulty concentrating. Has never been told she has pauses in her breathing and does not snore. Recent TSH normal on levothyroxine. Pt denies constipation, hair/skin changes, cold intolerance, anxiety, depressed mood.       Past medical history, family history and social history are reviewed and there are no significant changes.     ROS:  Review of Systems   Constitutional: Positive for fatigue. Negative for activity change, appetite change and unexpected weight change.   HENT: Negative for congestion, ear pain, rhinorrhea, sinus pressure, sinus pain, sore throat and trouble swallowing.    Eyes: Negative for pain, redness and visual disturbance.   Respiratory: Negative for cough, chest tightness, shortness of breath and wheezing.    Cardiovascular: Negative for chest pain and palpitations.   Gastrointestinal: Negative for abdominal pain, constipation, diarrhea, nausea and vomiting.   Endocrine: Negative for cold intolerance, heat intolerance, polydipsia and polyuria.   Genitourinary: Negative for difficulty urinating, dysuria, flank pain, frequency, pelvic pain, vaginal bleeding and vaginal discharge.   Musculoskeletal: Negative for arthralgias and joint swelling.   Skin: Negative for rash.   Allergic/Immunologic: Negative for environmental allergies and food allergies.   Neurological: Negative for dizziness, seizures, syncope, weakness and headaches.   Hematological: Negative for adenopathy.   Psychiatric/Behavioral: Negative for confusion,  "dysphoric mood and suicidal ideas. The patient is not nervous/anxious.           Current Outpatient Medications:     atorvastatin (LIPITOR) 20 MG tablet, Take 1 tablet (20 mg total) by mouth once daily., Disp: 90 tablet, Rfl: 3    dulaglutide (TRULICITY) 0.75 mg/0.5 mL PnIj, Inject 0.5 mLs (0.75 mg total) into the skin every 7 days., Disp: 4 Syringe, Rfl: 3    escitalopram oxalate (LEXAPRO) 10 MG tablet, Take 1 tablet (10 mg total) by mouth once daily., Disp: 90 tablet, Rfl: 3    fenofibrate 160 MG Tab, TAKE ONE TABLET BY MOUTH EVERY DAY, Disp: 90 tablet, Rfl: 1    hydrOXYzine HCl (ATARAX) 25 MG tablet, Take 1 tablet (25 mg total) by mouth 3 (three) times daily as needed for Anxiety (or insomnia)., Disp: 30 tablet, Rfl: 1    levothyroxine (SYNTHROID) 100 MCG tablet, TAKE ONE TABLET BY MOUTH EVERY DAY, Disp: 30 tablet, Rfl: 1    lisinopril 10 MG tablet, TAKE ONE TABLET BY MOUTH EVERY DAY, Disp: 90 tablet, Rfl: 1    metFORMIN (GLUCOPHAGE) 500 MG tablet, Take 500 mg by mouth 2 (two) times daily with meals., Disp: , Rfl:     nystatin (MYCOSTATIN) powder, Apply topically 2 (two) times daily., Disp: 60 g, Rfl: 2      Objective:     Physical Examination:     /72   Pulse 85   Resp 20   Ht 5' 4" (1.626 m)   Wt 87.7 kg (193 lb 6 oz)   LMP 04/01/2019 (Exact Date)   SpO2 98%   BMI 33.19 kg/m²     Physical Exam   Constitutional: She is oriented to person, place, and time. She appears well-developed and well-nourished. No distress.   HENT:   Head: Normocephalic and atraumatic.   Nose: Nose normal.   Mouth/Throat: Oropharynx is clear and moist.   Eyes: Pupils are equal, round, and reactive to light. Conjunctivae and EOM are normal.   Neck: Normal range of motion. Neck supple. No thyromegaly present.   Cardiovascular: Normal rate, regular rhythm, normal heart sounds and intact distal pulses.   No murmur heard.  Pulmonary/Chest: Effort normal and breath sounds normal.   Abdominal: Soft. Bowel sounds are normal. " She exhibits no distension and no mass. There is no tenderness. There is no guarding.   Musculoskeletal: She exhibits no edema.   Lymphadenopathy:     She has no cervical adenopathy.   Neurological: She is alert and oriented to person, place, and time.   Skin: Skin is warm and dry.       Assessment/Plan:   Tsering is a 44 y.o. female here for annual well visit.      Problem List Items Addressed This Visit        Psychiatric    Anxiety    Current Assessment & Plan     Continue lexapro. Previously tried augmentation with bupropion due to pt's previous c/o fatigue and difficulty concentrating, but it caused increased anxiety.             Cardiac/Vascular    Benign hypertension    Current Assessment & Plan     Well controlled on lisinopril 10mg. Normal CMP 4/2019.           Hypertriglyceridemia    Current Assessment & Plan     Continue on lipitor 20mg and tricor 160mg.  TG's improved from 400 to 200 on lipids 4/2019. LDL 76.             Endocrine    Diabetes mellitus without complication    Current Assessment & Plan     HbA1c up to 8.3% 1/2019. Fasting AM glucose improving after addition of trulicity.  Continue metformin 500mg BID, trulicity.   Check HbA1c. Foot exam 1/2019. Eye exam scheduled for today.          Relevant Orders    Hemoglobin A1c    Subclinical hypothyroidism    Current Assessment & Plan     Continue synthroid 100mcg daily. TSH normal 4/2019.             Other    Sleep disturbance    Current Assessment & Plan     Poor sleep hygiene and frequent awakenings likely cause of excessive fatigue. Pt advised to go to be earlier and try to get all 7-8 hours at once.  Pt declined referral for sleep study.            Other Visit Diagnoses     Annual physical exam    -  Primary    Relevant Orders    (In Office Administered) Pneumococcal Polysaccharide Vaccine (23 Valent) (SQ/IM) (Completed)          Health Maintenance   Topic Date Due    Eye Exam  03/22/1985    Pap Smear  05/21/2018    Hemoglobin A1c   07/24/2019    Foot Exam  01/24/2020    Mammogram  03/08/2020    Lipid Panel  04/11/2020    Low Dose Statin  04/15/2020    TETANUS VACCINE  05/15/2025    Pneumococcal Vaccine (Medium Risk)  Completed       Health Maintenance reviewed - eye exam today, will request more recent pap smear records.    Discussion:     Follow up in about 2 months (around 6/15/2019) for f/u diabetes, fatigue.    Goals     None          Electronically signed by Giuliana Galaviz

## 2019-04-15 NOTE — PATIENT INSTRUCTIONS
Try hydroxyzine prescription, diphenhydramine over the counter or melatonin over the counter for sleep.      Let us know when you are due to fill trulicity so we can change it to the preferred medication.

## 2019-06-12 DIAGNOSIS — E03.8 SUBCLINICAL HYPOTHYROIDISM: ICD-10-CM

## 2019-06-12 RX ORDER — LEVOTHYROXINE SODIUM 100 UG/1
TABLET ORAL
Qty: 30 TABLET | Refills: 5 | Status: SHIPPED | OUTPATIENT
Start: 2019-06-12 | End: 2019-12-18 | Stop reason: SDUPTHER

## 2019-08-20 DIAGNOSIS — E11.9 DIABETES MELLITUS WITHOUT COMPLICATION: ICD-10-CM

## 2019-08-20 RX ORDER — METFORMIN HYDROCHLORIDE 500 MG/1
TABLET ORAL
Qty: 270 TABLET | Refills: 1 | Status: SHIPPED | OUTPATIENT
Start: 2019-08-20 | End: 2019-12-18 | Stop reason: SDUPTHER

## 2019-10-08 DIAGNOSIS — I10 BENIGN HYPERTENSION: ICD-10-CM

## 2019-10-08 RX ORDER — LISINOPRIL 10 MG/1
TABLET ORAL
Qty: 90 TABLET | Refills: 1 | Status: SHIPPED | OUTPATIENT
Start: 2019-10-08 | End: 2019-12-18 | Stop reason: SDUPTHER

## 2019-12-18 ENCOUNTER — OFFICE VISIT (OUTPATIENT)
Dept: FAMILY MEDICINE | Facility: CLINIC | Age: 44
End: 2019-12-18
Payer: MEDICAID

## 2019-12-18 VITALS
OXYGEN SATURATION: 98 % | WEIGHT: 198.19 LBS | HEART RATE: 83 BPM | BODY MASS INDEX: 33.84 KG/M2 | SYSTOLIC BLOOD PRESSURE: 130 MMHG | RESPIRATION RATE: 18 BRPM | DIASTOLIC BLOOD PRESSURE: 74 MMHG | HEIGHT: 64 IN

## 2019-12-18 DIAGNOSIS — E11.9 DIABETES MELLITUS WITHOUT COMPLICATION: Primary | ICD-10-CM

## 2019-12-18 DIAGNOSIS — E11.9 DIABETES MELLITUS WITHOUT COMPLICATION: ICD-10-CM

## 2019-12-18 DIAGNOSIS — E78.2 MIXED HYPERLIPIDEMIA: ICD-10-CM

## 2019-12-18 DIAGNOSIS — E78.1 HYPERTRIGLYCERIDEMIA: ICD-10-CM

## 2019-12-18 DIAGNOSIS — I10 BENIGN HYPERTENSION: ICD-10-CM

## 2019-12-18 DIAGNOSIS — E03.8 SUBCLINICAL HYPOTHYROIDISM: ICD-10-CM

## 2019-12-18 LAB — HBA1C MFR BLD: 7.2 %

## 2019-12-18 PROCEDURE — 99213 OFFICE O/P EST LOW 20 MIN: CPT | Performed by: INTERNAL MEDICINE

## 2019-12-18 PROCEDURE — 99214 OFFICE O/P EST MOD 30 MIN: CPT | Mod: S$PBB,,, | Performed by: INTERNAL MEDICINE

## 2019-12-18 PROCEDURE — 99214 PR OFFICE/OUTPT VISIT, EST, LEVL IV, 30-39 MIN: ICD-10-PCS | Mod: S$PBB,,, | Performed by: INTERNAL MEDICINE

## 2019-12-18 PROCEDURE — 83036 HEMOGLOBIN GLYCOSYLATED A1C: CPT | Mod: PBBFAC | Performed by: INTERNAL MEDICINE

## 2019-12-18 RX ORDER — LEVOTHYROXINE SODIUM 100 UG/1
100 TABLET ORAL DAILY
Qty: 90 TABLET | Refills: 3 | Status: SHIPPED | OUTPATIENT
Start: 2019-12-18 | End: 2020-12-01

## 2019-12-18 RX ORDER — LISINOPRIL 10 MG/1
10 TABLET ORAL DAILY
Qty: 90 TABLET | Refills: 1 | Status: SHIPPED | OUTPATIENT
Start: 2019-12-18 | End: 2020-12-21

## 2019-12-18 RX ORDER — METFORMIN HYDROCHLORIDE 500 MG/1
500 TABLET ORAL 3 TIMES DAILY
Qty: 270 TABLET | Refills: 1 | Status: CANCELLED | OUTPATIENT
Start: 2019-12-18

## 2019-12-18 RX ORDER — AMOXICILLIN 500 MG/1
CAPSULE ORAL
Refills: 0 | COMMUNITY
Start: 2019-12-15 | End: 2020-06-01

## 2019-12-18 RX ORDER — METFORMIN HYDROCHLORIDE 500 MG/1
500 TABLET ORAL 3 TIMES DAILY
Qty: 270 TABLET | Refills: 1 | Status: SHIPPED | OUTPATIENT
Start: 2019-12-18 | End: 2020-12-01

## 2019-12-18 RX ORDER — ESCITALOPRAM OXALATE 10 MG/1
10 TABLET ORAL DAILY
Qty: 90 TABLET | Refills: 3 | Status: SHIPPED | OUTPATIENT
Start: 2019-12-18 | End: 2020-12-09 | Stop reason: SDUPTHER

## 2019-12-18 RX ORDER — ATORVASTATIN CALCIUM 20 MG/1
20 TABLET, FILM COATED ORAL DAILY
Qty: 90 TABLET | Refills: 3 | Status: SHIPPED | OUTPATIENT
Start: 2019-12-18 | End: 2020-12-09 | Stop reason: SDUPTHER

## 2019-12-18 RX ORDER — FENOFIBRATE 160 MG/1
160 TABLET ORAL DAILY
Qty: 90 TABLET | Refills: 3 | Status: SHIPPED | OUTPATIENT
Start: 2019-12-18 | End: 2020-02-03 | Stop reason: SDUPTHER

## 2019-12-18 NOTE — ASSESSMENT & PLAN NOTE
Continue on lipitor 20mg and tricor 160mg.  TG's improved from 400 to 200 on lipids 4/2019. LDL 76.

## 2019-12-18 NOTE — ASSESSMENT & PLAN NOTE
HbA1c improved at 7.2%. Increase metformin to 1500mg daily, and restart trulicity. Foot exam 1/2019. Eye exam 4/2019.

## 2019-12-18 NOTE — PROGRESS NOTES
ADULT FOLLOW-UP VISIT    Subjective:     Chief Complaint:  Diabetes      43 yo woman here for f/u of DM.  Was supposed to have A1c done 1 week after last visit in April but forgot to go. A1c today is 7.2%, down from 8.3% 1/2019.  Pt states she has been watching her diet more. She is only taking metformin 1000mg daily. Forgets AM dose. Stopped trulicity as she did not think it was covered anymore. Otherwise reports c/w meds for HTN, lipids, depression, thyroid. No acute complaints today. Completing course of amoxicillin for URI (given at urgent care).     Follow-up   Associated symptoms include swollen glands. Pertinent negatives include no abdominal pain, anorexia, arthralgias, change in bowel habit, chest pain, chills, congestion, coughing, diaphoresis, fatigue, fever, headaches, joint swelling, myalgias, nausea, neck pain, numbness, rash, sore throat, urinary symptoms, vertigo, visual change, vomiting or weakness.         Ms. Carlos  has a past medical history of Anxiety, Breast cyst, Diabetes mellitus, type 2, High calcium levels, Hypertension, Hypertriglyceridemia, Hypothyroidism, and Umbilical hernia.    Family history and social history are reviewed and there are no significant changes.     ROS:  Review of Systems   Constitutional: Negative for chills, diaphoresis, fatigue and fever.   HENT: Negative for congestion and sore throat.    Respiratory: Negative for cough.    Cardiovascular: Negative for chest pain.   Gastrointestinal: Negative for abdominal pain, anorexia, change in bowel habit, nausea and vomiting.   Musculoskeletal: Negative for arthralgias, joint swelling, myalgias and neck pain.   Skin: Negative for rash.   Neurological: Negative for vertigo, weakness, numbness and headaches.          Current Outpatient Medications:     amoxicillin (AMOXIL) 500 MG capsule, TAKE 1 CAPSULE BY MOUTH EVERY 12 HOURS FOR 10 DAYS, Disp: , Rfl: 0    atorvastatin (LIPITOR) 20 MG  "tablet, Take 1 tablet (20 mg total) by mouth once daily., Disp: 90 tablet, Rfl: 3    escitalopram oxalate (LEXAPRO) 10 MG tablet, Take 1 tablet (10 mg total) by mouth once daily., Disp: 90 tablet, Rfl: 3    fenofibrate 160 MG Tab, Take 1 tablet (160 mg total) by mouth once daily., Disp: 90 tablet, Rfl: 3    levothyroxine (SYNTHROID) 100 MCG tablet, Take 1 tablet (100 mcg total) by mouth once daily., Disp: 90 tablet, Rfl: 3    lisinopril 10 MG tablet, Take 1 tablet (10 mg total) by mouth once daily., Disp: 90 tablet, Rfl: 1    metFORMIN (GLUCOPHAGE) 500 MG tablet, Take 1 tablet (500 mg total) by mouth 3 (three) times daily., Disp: 270 tablet, Rfl: 1    dulaglutide (TRULICITY) 0.75 mg/0.5 mL PnIj, Inject 0.5 mLs (0.75 mg total) into the skin every 7 days., Disp: 4 Syringe, Rfl: 3    hydrOXYzine HCl (ATARAX) 25 MG tablet, Take 1 tablet (25 mg total) by mouth 3 (three) times daily as needed for Anxiety (or insomnia). (Patient not taking: Reported on 12/18/2019), Disp: 30 tablet, Rfl: 1    nystatin (MYCOSTATIN) powder, Apply topically 2 (two) times daily., Disp: 60 g, Rfl: 2      Objective:     Physical Examination:     /74   Pulse 83   Resp 18   Ht 5' 4" (1.626 m)   Wt 89.9 kg (198 lb 3 oz)   SpO2 98%   BMI 34.02 kg/m²     Physical Exam   Constitutional: She is oriented to person, place, and time. She appears well-developed and well-nourished. No distress.   HENT:   Right Ear: Tympanic membrane and external ear normal.   Left Ear: Tympanic membrane and external ear normal.   Nose: Nose normal. No mucosal edema. Right sinus exhibits no maxillary sinus tenderness and no frontal sinus tenderness. Left sinus exhibits no maxillary sinus tenderness and no frontal sinus tenderness.   Mouth/Throat: Oropharynx is clear and moist and mucous membranes are normal. No oropharyngeal exudate or posterior oropharyngeal erythema.   Eyes: Pupils are equal, round, and reactive to light. Conjunctivae are normal. Right " eye exhibits no discharge. Left eye exhibits no discharge.   Cardiovascular: Normal rate, regular rhythm, normal heart sounds and intact distal pulses.   No murmur heard.  Pulmonary/Chest: Effort normal and breath sounds normal. No respiratory distress. She has no wheezes. She has no rales.   Musculoskeletal: She exhibits no edema.   Lymphadenopathy:     She has no cervical adenopathy.   Neurological: She is alert and oriented to person, place, and time.   Skin: She is not diaphoretic.       Assessment/Plan:   Tsering is a 44 y.o. female here for follow-up.    Problem List Items Addressed This Visit        Cardiac/Vascular    Benign hypertension    Current Assessment & Plan     Well controlled on lisinopril 10mg. Normal CMP 4/2019.           Relevant Medications    lisinopril 10 MG tablet    Mixed hyperlipidemia    Current Assessment & Plan     Continue on lipitor 20mg and tricor 160mg.  TG's improved from 400 to 200 on lipids 4/2019. LDL 76.          Relevant Medications    atorvastatin (LIPITOR) 20 MG tablet    fenofibrate 160 MG Tab       Endocrine    Diabetes mellitus without complication - Primary    Current Assessment & Plan     HbA1c improved at 7.2%. Increase metformin to 1500mg daily, and restart trulicity. Foot exam 1/2019. Eye exam 4/2019.         Relevant Medications    dulaglutide (TRULICITY) 0.75 mg/0.5 mL PnIj    metFORMIN (GLUCOPHAGE) 500 MG tablet    Other Relevant Orders    Hemoglobin A1C, POCT (Completed)    Subclinical hypothyroidism    Current Assessment & Plan     Continue synthroid 100mcg daily. TSH normal 4/2019.          Relevant Medications    levothyroxine (SYNTHROID) 100 MCG tablet      Other Visit Diagnoses     Hypertriglyceridemia        on statin and trico.     Relevant Medications    atorvastatin (LIPITOR) 20 MG tablet    fenofibrate 160 MG Tab          Health Maintenance   Topic Date Due    Hemoglobin A1c  07/24/2019    Foot Exam  01/24/2020    Mammogram  03/08/2020    Lipid  Panel  04/11/2020    Eye Exam  04/15/2020    Low Dose Statin  12/18/2020    Pap Smear  03/16/2021    TETANUS VACCINE  05/15/2025    Pneumococcal Vaccine (Medium Risk)  Completed           Discussion:     Follow up in about 3 months (around 3/18/2020) for DM.    Goals    None         Electronically signed by Giuliana Galaviz

## 2020-01-15 ENCOUNTER — TELEPHONE (OUTPATIENT)
Dept: FAMILY MEDICINE | Facility: CLINIC | Age: 45
End: 2020-01-15

## 2020-01-15 DIAGNOSIS — E11.9 DIABETES MELLITUS WITHOUT COMPLICATION: Primary | ICD-10-CM

## 2020-02-03 DIAGNOSIS — E78.1 HYPERTRIGLYCERIDEMIA: ICD-10-CM

## 2020-02-03 RX ORDER — FENOFIBRATE 160 MG/1
160 TABLET ORAL DAILY
Qty: 90 TABLET | Refills: 3 | Status: SHIPPED | OUTPATIENT
Start: 2020-02-03 | End: 2022-01-12

## 2020-05-12 ENCOUNTER — TELEPHONE (OUTPATIENT)
Dept: FAMILY MEDICINE | Facility: CLINIC | Age: 45
End: 2020-05-12

## 2020-05-12 DIAGNOSIS — E03.8 SUBCLINICAL HYPOTHYROIDISM: ICD-10-CM

## 2020-05-12 DIAGNOSIS — E78.2 MIXED HYPERLIPIDEMIA: ICD-10-CM

## 2020-05-12 DIAGNOSIS — I10 BENIGN HYPERTENSION: Primary | ICD-10-CM

## 2020-05-12 DIAGNOSIS — E11.9 DIABETES MELLITUS WITHOUT COMPLICATION: ICD-10-CM

## 2020-05-12 NOTE — TELEPHONE ENCOUNTER
Reached out to reschedule post Covid appt.  Pt appt is 6/1/20 @ 9:40am.  She needs updated labs.  Clinical staff notified

## 2020-06-01 ENCOUNTER — OFFICE VISIT (OUTPATIENT)
Dept: FAMILY MEDICINE | Facility: CLINIC | Age: 45
End: 2020-06-01
Payer: MEDICAID

## 2020-06-01 ENCOUNTER — TELEPHONE (OUTPATIENT)
Dept: FAMILY MEDICINE | Facility: CLINIC | Age: 45
End: 2020-06-01

## 2020-06-01 VITALS
SYSTOLIC BLOOD PRESSURE: 100 MMHG | BODY MASS INDEX: 32.99 KG/M2 | OXYGEN SATURATION: 98 % | HEART RATE: 87 BPM | TEMPERATURE: 97 F | HEIGHT: 64 IN | RESPIRATION RATE: 18 BRPM | WEIGHT: 193.25 LBS | DIASTOLIC BLOOD PRESSURE: 62 MMHG

## 2020-06-01 DIAGNOSIS — E78.2 MIXED HYPERLIPIDEMIA: ICD-10-CM

## 2020-06-01 DIAGNOSIS — F41.9 ANXIETY: ICD-10-CM

## 2020-06-01 DIAGNOSIS — E11.9 DIABETES MELLITUS WITHOUT COMPLICATION: Primary | ICD-10-CM

## 2020-06-01 DIAGNOSIS — I10 BENIGN HYPERTENSION: ICD-10-CM

## 2020-06-01 DIAGNOSIS — R07.89 ATYPICAL CHEST PAIN: ICD-10-CM

## 2020-06-01 LAB
EKG 12-LEAD: NORMAL
HBA1C MFR BLD: 7.2 %

## 2020-06-01 PROCEDURE — 83036 HEMOGLOBIN GLYCOSYLATED A1C: CPT | Mod: PBBFAC | Performed by: INTERNAL MEDICINE

## 2020-06-01 PROCEDURE — 99214 PR OFFICE/OUTPT VISIT, EST, LEVL IV, 30-39 MIN: ICD-10-PCS | Mod: 25,S$PBB,, | Performed by: INTERNAL MEDICINE

## 2020-06-01 PROCEDURE — 93010 POCT EKG 12-LEAD: ICD-10-PCS | Mod: S$PBB,,, | Performed by: INTERNAL MEDICINE

## 2020-06-01 PROCEDURE — 93005 ELECTROCARDIOGRAM TRACING: CPT | Mod: PBBFAC | Performed by: INTERNAL MEDICINE

## 2020-06-01 PROCEDURE — 99215 OFFICE O/P EST HI 40 MIN: CPT | Performed by: INTERNAL MEDICINE

## 2020-06-01 PROCEDURE — 99214 OFFICE O/P EST MOD 30 MIN: CPT | Mod: 25,S$PBB,, | Performed by: INTERNAL MEDICINE

## 2020-06-01 PROCEDURE — 93010 ELECTROCARDIOGRAM REPORT: CPT | Mod: S$PBB,,, | Performed by: INTERNAL MEDICINE

## 2020-06-01 NOTE — ASSESSMENT & PLAN NOTE
Continue lexapro.  Chest pain may be 2/2 anxiety but given pt's description and her other risk factors, needs cardiac eval.

## 2020-06-01 NOTE — ASSESSMENT & PLAN NOTE
Continue on lipitor 20mg and tricor 160mg.  TG's improved from 400 to 200 on lipids 4/2019. LDL 76. Lipids ordered

## 2020-06-01 NOTE — ASSESSMENT & PLAN NOTE
EKG and exam within normal limits today.  As noted above, patient thinks that her chest pain is likely due to anxiety, however given patient's strong family history of coronary artery disease and her significant risk factors, I am referring her to Cardiology for a full evaluation.

## 2020-06-01 NOTE — ASSESSMENT & PLAN NOTE
HbA1c stable at 7.2%. Continue metformin, change bydureon to trulicity given intolerance of bydureon injection. Foot exam today.  Eye exam 4/2019.

## 2020-06-01 NOTE — TELEPHONE ENCOUNTER
----- Message from Giuliana Galaviz MD sent at 6/1/2020  1:00 PM CDT -----  Would you fax a copy of today's note and EKG to Dr. Smith?

## 2020-06-01 NOTE — PROGRESS NOTES
ADULT FOLLOW-UP VISIT    Subjective:     Chief Complaint:  Diabetes      45-year-old woman with a history of hypertension, hyperlipidemia, type 2 diabetes here for routine follow-up of her diabetes.  Her hemoglobin A1c is stable today at 7.2%.  At last visit, Trulicity was changed to Bydureon due to insurance.  Patient states that she cannot tolerate the Bydureon injections due to the size of the needle, pain with injection, excessive bleeding and bruising at sites of injection, as well as long lasting not at the site of injection.  Her last injection was a few weeks ago and she had bleeding after the injection and was scared to do any more.  She otherwise reports compliance with her medications which include metformin for diabetes, as well as lisinopril for blood pressure and atorvastatin and fenofibrate for hyperlipidemia.  Patient has a new complaint today which is intermittent in chest pain.  She states she 1st noticed it a few months ago.  It occurs approximately once a week.  It has occurred both at rest and during physical activity including doing housework.  It has also been brought on by stress/anxiety.  Patient thinks it is just anxiety but was concerned enough to mention it today.  She states that it typically goes away after resting quietly for few minutes and taking deep breaths.  She denies associated shortness of breath or diaphoresis.  The pain is substernal and does radiate sometimes to her neck or down her left arm.    Chest Pain    Associated symptoms include exertional chest pressure. Pertinent negatives include no abdominal pain, back pain, claudication, cough, diaphoresis, dizziness, fever, headaches, hemoptysis, irregular heartbeat, leg pain, lower extremity edema, malaise/fatigue, nausea, near-syncope, numbness, orthopnea, palpitations, PND, shortness of breath, sputum production, syncope, vomiting or weakness.         Ms. Carlos  has a past medical  history of Anxiety, Breast cyst, Diabetes mellitus, type 2, High calcium levels, Hypertension, Hypertriglyceridemia, Hypothyroidism, and Umbilical hernia.    Family history and social history are reviewed and there are no significant changes.     ROS:  Review of Systems   Constitutional: Negative for diaphoresis, fever and malaise/fatigue.   Respiratory: Negative for cough, hemoptysis, sputum production and shortness of breath.    Cardiovascular: Positive for chest pain. Negative for palpitations, orthopnea, claudication, syncope, PND and near-syncope.   Gastrointestinal: Negative for abdominal pain, nausea and vomiting.   Musculoskeletal: Negative for back pain.   Neurological: Negative for dizziness, weakness, numbness and headaches.          Current Outpatient Medications:     atorvastatin (LIPITOR) 20 MG tablet, Take 1 tablet (20 mg total) by mouth once daily., Disp: 90 tablet, Rfl: 3    dulaglutide (TRULICITY) 0.75 mg/0.5 mL PnIj, Inject 0.5 mLs (0.75 mg total) into the skin every 7 days., Disp: 4 Syringe, Rfl: 3    escitalopram oxalate (LEXAPRO) 10 MG tablet, Take 1 tablet (10 mg total) by mouth once daily., Disp: 90 tablet, Rfl: 3    fenofibrate 160 MG Tab, Take 1 tablet (160 mg total) by mouth once daily., Disp: 90 tablet, Rfl: 3    hydrOXYzine HCl (ATARAX) 25 MG tablet, Take 1 tablet (25 mg total) by mouth 3 (three) times daily as needed for Anxiety (or insomnia)., Disp: 30 tablet, Rfl: 1    levothyroxine (SYNTHROID) 100 MCG tablet, Take 1 tablet (100 mcg total) by mouth once daily., Disp: 90 tablet, Rfl: 3    lisinopril 10 MG tablet, Take 1 tablet (10 mg total) by mouth once daily., Disp: 90 tablet, Rfl: 1    metFORMIN (GLUCOPHAGE) 500 MG tablet, Take 1 tablet (500 mg total) by mouth 3 (three) times daily., Disp: 270 tablet, Rfl: 1    nystatin (MYCOSTATIN) powder, Apply topically 2 (two) times daily., Disp: 60 g, Rfl: 2      Objective:     Physical Examination:     /62   Pulse 87   Temp  "97.4 °F (36.3 °C)   Resp 18   Ht 5' 4" (1.626 m)   Wt 87.7 kg (193 lb 4 oz)   LMP 05/18/2020   SpO2 98%   BMI 33.17 kg/m²     Physical Exam   Constitutional: She is oriented to person, place, and time. She appears well-developed and well-nourished. No distress.   HENT:   Right Ear: External ear normal.   Left Ear: External ear normal.   Nose: Nose normal.   Mouth/Throat: Oropharynx is clear and moist and mucous membranes are normal.   Eyes: Pupils are equal, round, and reactive to light. Conjunctivae are normal. Right eye exhibits no discharge. Left eye exhibits no discharge.   Cardiovascular: Normal rate, regular rhythm, normal heart sounds and intact distal pulses.   No murmur heard.  Pulses:       Dorsalis pedis pulses are 2+ on the right side, and 2+ on the left side.        Posterior tibial pulses are 2+ on the right side, and 2+ on the left side.   Pulmonary/Chest: Effort normal and breath sounds normal. No respiratory distress. She has no wheezes. She has no rales.   Musculoskeletal: She exhibits no edema.   Feet:   Right Foot:   Protective Sensation: 10 sites tested. 10 sites sensed.   Skin Integrity: Positive for dry skin. Negative for ulcer, skin breakdown or callus.   Left Foot:   Protective Sensation: 10 sites tested. 10 sites sensed.   Skin Integrity: Positive for dry skin. Negative for ulcer, skin breakdown, warmth or callus.   Neurological: She is alert and oriented to person, place, and time.   Skin: She is not diaphoretic.       Assessment/Plan:   Tsering is a 45 y.o. female here for follow-up.    Problem List Items Addressed This Visit        Psychiatric    Anxiety    Current Assessment & Plan     Continue lexapro.  Chest pain may be 2/2 anxiety but given pt's description and her other risk factors, needs cardiac eval.             Cardiac/Vascular    Benign hypertension    Current Assessment & Plan     Well controlled on lisinopril 10mg. Normal CMP 4/2019. CMP ordered            Mixed " hyperlipidemia    Current Assessment & Plan     Continue on lipitor 20mg and tricor 160mg.  TG's improved from 400 to 200 on lipids 4/2019. LDL 76. Lipids ordered            Endocrine    Diabetes mellitus without complication - Primary    Current Assessment & Plan     HbA1c stable at 7.2%. Continue metformin, change bydureon to trulicity given intolerance of bydureon injection. Foot exam today.  Eye exam 4/2019.         Relevant Medications    dulaglutide (TRULICITY) 0.75 mg/0.5 mL PnIj    Other Relevant Orders    Hemoglobin A1C, POCT (Completed)       Other    Atypical chest pain    Current Assessment & Plan     EKG and exam within normal limits today.  As noted above, patient thinks that her chest pain is likely due to anxiety, however given patient's strong family history of coronary artery disease and her significant risk factors, I am referring her to Cardiology for a full evaluation.         Relevant Orders    Ambulatory referral/consult to Cardiology    EKG 12-lead          Health Maintenance   Topic Date Due    Lipid Panel  04/11/2020    Eye Exam  04/15/2020    Mammogram  03/08/2021 (Originally 3/8/2020)    Hemoglobin A1c  06/18/2020    Pap Smear  03/16/2021    Low Dose Statin  06/01/2021    Foot Exam  06/01/2021    TETANUS VACCINE  05/15/2025    Pneumococcal Vaccine (Medium Risk)  Completed           Discussion:     Follow up in about 3 months (around 9/1/2020).    Goals    None         Electronically signed by Giuliana Galaviz

## 2020-07-22 ENCOUNTER — LAB VISIT (OUTPATIENT)
Dept: LAB | Facility: HOSPITAL | Age: 45
End: 2020-07-22
Attending: INTERNAL MEDICINE
Payer: MEDICAID

## 2020-07-22 DIAGNOSIS — I10 BENIGN HYPERTENSION: ICD-10-CM

## 2020-07-22 DIAGNOSIS — E11.9 DIABETES MELLITUS WITHOUT COMPLICATION: ICD-10-CM

## 2020-07-22 DIAGNOSIS — E78.2 MIXED HYPERLIPIDEMIA: ICD-10-CM

## 2020-07-22 DIAGNOSIS — E03.8 SUBCLINICAL HYPOTHYROIDISM: ICD-10-CM

## 2020-07-22 LAB
ALBUMIN SERPL BCP-MCNC: 4 G/DL (ref 3.5–5.2)
ALP SERPL-CCNC: 42 U/L (ref 55–135)
ALT SERPL W/O P-5'-P-CCNC: 15 U/L (ref 10–44)
ANION GAP SERPL CALC-SCNC: 10 MMOL/L (ref 8–16)
AST SERPL-CCNC: 18 U/L (ref 10–40)
BILIRUB SERPL-MCNC: 0.5 MG/DL (ref 0.1–1)
BUN SERPL-MCNC: 12 MG/DL (ref 6–20)
CALCIUM SERPL-MCNC: 9.1 MG/DL (ref 8.7–10.5)
CHLORIDE SERPL-SCNC: 104 MMOL/L (ref 95–110)
CHOLEST SERPL-MCNC: 162 MG/DL (ref 120–199)
CHOLEST/HDLC SERPL: 4.9 {RATIO} (ref 2–5)
CO2 SERPL-SCNC: 24 MMOL/L (ref 23–29)
CREAT SERPL-MCNC: 0.5 MG/DL (ref 0.5–1.4)
EST. GFR  (AFRICAN AMERICAN): >60 ML/MIN/1.73 M^2
EST. GFR  (NON AFRICAN AMERICAN): >60 ML/MIN/1.73 M^2
ESTIMATED AVG GLUCOSE: 143 MG/DL (ref 68–131)
GLUCOSE SERPL-MCNC: 142 MG/DL (ref 70–110)
HBA1C MFR BLD HPLC: 6.6 % (ref 4.5–6.2)
HDLC SERPL-MCNC: 33 MG/DL (ref 40–75)
HDLC SERPL: 20.4 % (ref 20–50)
LDLC SERPL CALC-MCNC: ABNORMAL MG/DL (ref 63–159)
NONHDLC SERPL-MCNC: 129 MG/DL
POTASSIUM SERPL-SCNC: 4.2 MMOL/L (ref 3.5–5.1)
PROT SERPL-MCNC: 7.2 G/DL (ref 6–8.4)
SODIUM SERPL-SCNC: 138 MMOL/L (ref 136–145)
TRIGL SERPL-MCNC: 534 MG/DL (ref 30–150)
TSH SERPL DL<=0.005 MIU/L-ACNC: 2.77 UIU/ML (ref 0.34–5.6)

## 2020-07-22 PROCEDURE — 36415 COLL VENOUS BLD VENIPUNCTURE: CPT

## 2020-07-22 PROCEDURE — 83036 HEMOGLOBIN GLYCOSYLATED A1C: CPT

## 2020-07-22 PROCEDURE — 80061 LIPID PANEL: CPT

## 2020-07-22 PROCEDURE — 84443 ASSAY THYROID STIM HORMONE: CPT

## 2020-07-22 PROCEDURE — 80053 COMPREHEN METABOLIC PANEL: CPT

## 2020-07-23 ENCOUNTER — OFFICE VISIT (OUTPATIENT)
Dept: FAMILY MEDICINE | Facility: CLINIC | Age: 45
End: 2020-07-23
Payer: MEDICAID

## 2020-07-23 VITALS
HEIGHT: 64 IN | RESPIRATION RATE: 20 BRPM | OXYGEN SATURATION: 99 % | DIASTOLIC BLOOD PRESSURE: 86 MMHG | BODY MASS INDEX: 33.29 KG/M2 | SYSTOLIC BLOOD PRESSURE: 124 MMHG | HEART RATE: 84 BPM | WEIGHT: 195 LBS

## 2020-07-23 DIAGNOSIS — R07.89 ATYPICAL CHEST PAIN: Primary | ICD-10-CM

## 2020-07-23 DIAGNOSIS — E03.8 SUBCLINICAL HYPOTHYROIDISM: ICD-10-CM

## 2020-07-23 DIAGNOSIS — E78.2 MIXED HYPERLIPIDEMIA: ICD-10-CM

## 2020-07-23 DIAGNOSIS — E11.9 DIABETES MELLITUS WITHOUT COMPLICATION: ICD-10-CM

## 2020-07-23 DIAGNOSIS — I10 BENIGN HYPERTENSION: ICD-10-CM

## 2020-07-23 DIAGNOSIS — F41.9 ANXIETY: ICD-10-CM

## 2020-07-23 PROCEDURE — G0463 HOSPITAL OUTPT CLINIC VISIT: HCPCS

## 2020-07-23 PROCEDURE — 99214 PR OFFICE/OUTPT VISIT, EST, LEVL IV, 30-39 MIN: ICD-10-PCS | Mod: S$PBB,,, | Performed by: INTERNAL MEDICINE

## 2020-07-23 PROCEDURE — 99214 OFFICE O/P EST MOD 30 MIN: CPT | Mod: S$PBB,,, | Performed by: INTERNAL MEDICINE

## 2020-07-23 PROCEDURE — 99214 OFFICE O/P EST MOD 30 MIN: CPT | Performed by: INTERNAL MEDICINE

## 2020-07-23 RX ORDER — NITROGLYCERIN 0.4 MG/1
0.4 TABLET SUBLINGUAL EVERY 5 MIN PRN
COMMUNITY

## 2020-07-23 RX ORDER — HYDROXYZINE HYDROCHLORIDE 10 MG/1
10 TABLET, FILM COATED ORAL 3 TIMES DAILY PRN
Qty: 30 TABLET | Refills: 5 | Status: SHIPPED | OUTPATIENT
Start: 2020-07-23 | End: 2022-01-12

## 2020-07-23 NOTE — PROGRESS NOTES
ADULT FOLLOW-UP VISIT    Subjective:     Chief Complaint:  Diabetes      45-year-old woman here for follow-up of hypertension, hyperlipidemia, type 2 diabetes, anxiety, chest pain.  Reviewed recent labs.  CMP, TSH are within normal limits.  Lipid panel shows elevated triglycerides, unable to calculate LDL due to triglycerides over 400.  Patient reports compliance with fenofibrate and atorvastatin.  Hemoglobin A1c improved at 6.6%.  Patient states she is taking metformin daily and is having no problem tolerating the weekly Trulicity injections.  She has had 1 or 2 more episodes of chest pain associated with anxiety.  She is undergoing a workup with cardiology for this and has several cardiac study scheduled in the upcoming weeks.  She also states that the cardiologist is planning to recheck her lipids with a direct LDL.  A cardiologist thinks that her episodes are most likely related to anxiety and he gave her lorazepam to take when she feels anxious.  Patient states she took it 1 time and it had paradoxical effect of making her feel more anxious and almost manic.  She does not wish to take this medication again.    Diabetes  Associated symptoms include chest pain. Pertinent negatives for diabetes include no blurred vision, no fatigue, no foot paresthesias, no foot ulcerations, no polydipsia, no polyphagia, no polyuria, no visual change, no weakness and no weight loss.         Ms. Carlos  has a past medical history of Anxiety, Breast cyst, Diabetes mellitus, type 2, High calcium levels, Hypertension, Hypertriglyceridemia, Hypothyroidism, and Umbilical hernia.    Family history and social history are reviewed and there are no significant changes.     ROS:  Review of Systems   Constitutional: Negative for fatigue and weight loss.   Eyes: Negative for blurred vision.   Cardiovascular: Positive for chest pain.   Endocrine: Negative for polydipsia, polyphagia and polyuria.  "  Neurological: Negative for weakness.          Current Outpatient Medications:     atorvastatin (LIPITOR) 20 MG tablet, Take 1 tablet (20 mg total) by mouth once daily., Disp: 90 tablet, Rfl: 3    dulaglutide (TRULICITY) 0.75 mg/0.5 mL PnIj, Inject 0.5 mLs (0.75 mg total) into the skin every 7 days., Disp: 4 Syringe, Rfl: 3    escitalopram oxalate (LEXAPRO) 10 MG tablet, Take 1 tablet (10 mg total) by mouth once daily., Disp: 90 tablet, Rfl: 3    fenofibrate 160 MG Tab, Take 1 tablet (160 mg total) by mouth once daily., Disp: 90 tablet, Rfl: 3    levothyroxine (SYNTHROID) 100 MCG tablet, Take 1 tablet (100 mcg total) by mouth once daily., Disp: 90 tablet, Rfl: 3    lisinopril 10 MG tablet, Take 1 tablet (10 mg total) by mouth once daily., Disp: 90 tablet, Rfl: 1    metFORMIN (GLUCOPHAGE) 500 MG tablet, Take 1 tablet (500 mg total) by mouth 3 (three) times daily., Disp: 270 tablet, Rfl: 1    nitroGLYCERIN (NITROSTAT) 0.4 MG SL tablet, Place 0.4 mg under the tongue every 5 (five) minutes as needed for Chest pain., Disp: , Rfl:     hydrOXYzine HCL (ATARAX) 10 MG Tab, Take 1 tablet (10 mg total) by mouth 3 (three) times daily as needed (anxiety or insomnia)., Disp: 30 tablet, Rfl: 5    nystatin (MYCOSTATIN) powder, Apply topically 2 (two) times daily., Disp: 60 g, Rfl: 2      Objective:     Physical Examination:     /86   Pulse 84   Resp 20   Ht 5' 4" (1.626 m)   Wt 88.5 kg (195 lb)   SpO2 99%   BMI 33.47 kg/m²     Physical Exam  Constitutional:       General: She is not in acute distress.     Appearance: She is well-developed. She is not diaphoretic.   HENT:      Right Ear: External ear normal.      Left Ear: External ear normal.      Nose: Nose normal.   Eyes:      General:         Right eye: No discharge.         Left eye: No discharge.      Conjunctiva/sclera: Conjunctivae normal.      Pupils: Pupils are equal, round, and reactive to light.   Cardiovascular:      Rate and Rhythm: Normal rate " and regular rhythm.      Heart sounds: Normal heart sounds. No murmur.   Pulmonary:      Effort: Pulmonary effort is normal. No respiratory distress.      Breath sounds: Normal breath sounds. No wheezing or rales.   Neurological:      Mental Status: She is alert and oriented to person, place, and time.         Assessment/Plan:   Tsering is a 45 y.o. female here for follow-up.    Problem List Items Addressed This Visit        Psychiatric    Anxiety    Current Assessment & Plan     Continue Lexapro.  Patient had paradoxical reaction with lorazepam.  Trial of hydroxyzine as needed for insomnia or anxiety.         Relevant Medications    hydrOXYzine HCL (ATARAX) 10 MG Tab       Cardiac/Vascular    Benign hypertension    Current Assessment & Plan     Well controlled on lisinopril 10mg. Normal CMP 7/2020.         Mixed hyperlipidemia    Current Assessment & Plan     Continue on lipitor 20mg and tricor 160mg.  TG's improved from 400 to 200 on lipids 4/2019, back up to >400 7/2020, unable to calculate LDL.  Pt has upcoming repeat lipid check with direct LDL.             Endocrine    Diabetes mellitus without complication    Current Assessment & Plan     HbA1c improved at 6.6%. Continue metformin,trulicity. Foot exam 6/2020.  Eye exam 4/2019.         Subclinical hypothyroidism    Current Assessment & Plan     Continue synthroid 100mcg daily. TSH normal 7/2020.             Other    Atypical chest pain - Primary    Current Assessment & Plan     EKG and exam within normal limits.  Most likely related anxiety, however given patient's strong family history of coronary artery disease and her significant risk factors, she is seeing cardiology for a full evaluation.               Health Maintenance   Topic Date Due    Hepatitis C Screening  1975    Eye Exam  04/15/2020    Mammogram  03/08/2021 (Originally 3/8/2020)    Hemoglobin A1c  01/22/2021    Pap Smear  03/16/2021    Foot Exam  06/01/2021    Lipid Panel   07/22/2021    Low Dose Statin  07/23/2021    TETANUS VACCINE  05/15/2025    Pneumococcal Vaccine (Medium Risk)  Completed           Discussion:     No follow-ups on file.    Goals    None         Electronically signed by Giuliana Galaviz

## 2020-07-24 NOTE — ASSESSMENT & PLAN NOTE
Continue on lipitor 20mg and tricor 160mg.  TG's improved from 400 to 200 on lipids 4/2019, back up to >400 7/2020, unable to calculate LDL.  Pt has upcoming repeat lipid check with direct LDL.

## 2020-07-24 NOTE — ASSESSMENT & PLAN NOTE
EKG and exam within normal limits.  Most likely related anxiety, however given patient's strong family history of coronary artery disease and her significant risk factors, she is seeing cardiology for a full evaluation.

## 2020-07-24 NOTE — ASSESSMENT & PLAN NOTE
Continue Lexapro.  Patient had paradoxical reaction with lorazepam.  Trial of hydroxyzine as needed for insomnia or anxiety.

## 2020-08-17 ENCOUNTER — TELEPHONE (OUTPATIENT)
Dept: FAMILY MEDICINE | Facility: CLINIC | Age: 45
End: 2020-08-17

## 2020-08-17 NOTE — TELEPHONE ENCOUNTER
"Patient called requesting some oral antibiotics ( Bactrim) due to "boils" around her private area. Patient stated that she has cream that she's been applying but the boils are not healing.   "

## 2020-08-17 NOTE — TELEPHONE ENCOUNTER
Spoke to patient and gave her the recommendation to go to the ER or Urgent Care per the provider. Patient verbally stated that she understood.

## 2022-01-12 ENCOUNTER — OFFICE VISIT (OUTPATIENT)
Dept: FAMILY MEDICINE | Facility: CLINIC | Age: 47
End: 2022-01-12
Payer: MEDICAID

## 2022-01-12 VITALS
TEMPERATURE: 98 F | HEART RATE: 100 BPM | OXYGEN SATURATION: 98 % | DIASTOLIC BLOOD PRESSURE: 80 MMHG | BODY MASS INDEX: 34.12 KG/M2 | HEIGHT: 64 IN | WEIGHT: 199.88 LBS | SYSTOLIC BLOOD PRESSURE: 132 MMHG | RESPIRATION RATE: 18 BRPM

## 2022-01-12 DIAGNOSIS — F41.9 ANXIETY: ICD-10-CM

## 2022-01-12 DIAGNOSIS — I10 BENIGN HYPERTENSION: ICD-10-CM

## 2022-01-12 DIAGNOSIS — Z11.59 SCREENING FOR VIRAL DISEASE: ICD-10-CM

## 2022-01-12 DIAGNOSIS — E03.8 SUBCLINICAL HYPOTHYROIDISM: ICD-10-CM

## 2022-01-12 DIAGNOSIS — E78.2 MIXED HYPERLIPIDEMIA: ICD-10-CM

## 2022-01-12 DIAGNOSIS — E11.9 DIABETES MELLITUS WITHOUT COMPLICATION: Primary | ICD-10-CM

## 2022-01-12 PROCEDURE — 4010F ACE/ARB THERAPY RXD/TAKEN: CPT | Mod: ,,, | Performed by: NURSE PRACTITIONER

## 2022-01-12 PROCEDURE — 3008F BODY MASS INDEX DOCD: CPT | Mod: ,,, | Performed by: NURSE PRACTITIONER

## 2022-01-12 PROCEDURE — 1160F RVW MEDS BY RX/DR IN RCRD: CPT | Mod: ,,, | Performed by: NURSE PRACTITIONER

## 2022-01-12 PROCEDURE — 3079F DIAST BP 80-89 MM HG: CPT | Mod: ,,, | Performed by: NURSE PRACTITIONER

## 2022-01-12 PROCEDURE — 4010F PR ACE/ARB THEARPY RXD/TAKEN: ICD-10-PCS | Mod: ,,, | Performed by: NURSE PRACTITIONER

## 2022-01-12 PROCEDURE — 99214 OFFICE O/P EST MOD 30 MIN: CPT | Mod: S$PBB,,, | Performed by: NURSE PRACTITIONER

## 2022-01-12 PROCEDURE — 1160F PR REVIEW ALL MEDS BY PRESCRIBER/CLIN PHARMACIST DOCUMENTED: ICD-10-PCS | Mod: ,,, | Performed by: NURSE PRACTITIONER

## 2022-01-12 PROCEDURE — 3075F SYST BP GE 130 - 139MM HG: CPT | Mod: ,,, | Performed by: NURSE PRACTITIONER

## 2022-01-12 PROCEDURE — 99214 PR OFFICE/OUTPT VISIT, EST, LEVL IV, 30-39 MIN: ICD-10-PCS | Mod: S$PBB,,, | Performed by: NURSE PRACTITIONER

## 2022-01-12 PROCEDURE — 3008F PR BODY MASS INDEX (BMI) DOCUMENTED: ICD-10-PCS | Mod: ,,, | Performed by: NURSE PRACTITIONER

## 2022-01-12 PROCEDURE — 3079F PR MOST RECENT DIASTOLIC BLOOD PRESSURE 80-89 MM HG: ICD-10-PCS | Mod: ,,, | Performed by: NURSE PRACTITIONER

## 2022-01-12 PROCEDURE — 99214 OFFICE O/P EST MOD 30 MIN: CPT | Performed by: NURSE PRACTITIONER

## 2022-01-12 PROCEDURE — 3075F PR MOST RECENT SYSTOLIC BLOOD PRESS GE 130-139MM HG: ICD-10-PCS | Mod: ,,, | Performed by: NURSE PRACTITIONER

## 2022-01-12 RX ORDER — ESCITALOPRAM OXALATE 10 MG/1
10 TABLET ORAL DAILY
Qty: 90 TABLET | Refills: 0 | Status: SHIPPED | OUTPATIENT
Start: 2022-01-12 | End: 2022-04-26

## 2022-01-12 RX ORDER — DULAGLUTIDE 0.75 MG/.5ML
INJECTION, SOLUTION SUBCUTANEOUS
Qty: 4 PEN | Refills: 2 | Status: SHIPPED | OUTPATIENT
Start: 2022-01-12 | End: 2022-05-10

## 2022-01-12 RX ORDER — LORAZEPAM 1 MG/1
1 TABLET ORAL 2 TIMES DAILY PRN
COMMUNITY
End: 2023-05-09

## 2022-01-12 RX ORDER — LISINOPRIL 10 MG/1
10 TABLET ORAL DAILY
Qty: 90 TABLET | Refills: 0 | Status: SHIPPED | OUTPATIENT
Start: 2022-01-12 | End: 2022-04-26

## 2022-01-12 RX ORDER — LEVOTHYROXINE SODIUM 100 UG/1
100 TABLET ORAL DAILY
Qty: 90 TABLET | Refills: 0 | Status: SHIPPED | OUTPATIENT
Start: 2022-01-12 | End: 2022-04-26

## 2022-01-12 RX ORDER — ATORVASTATIN CALCIUM 20 MG/1
20 TABLET, FILM COATED ORAL DAILY
Qty: 90 TABLET | Refills: 0 | Status: SHIPPED | OUTPATIENT
Start: 2022-01-12 | End: 2022-04-26

## 2022-01-12 RX ORDER — METFORMIN HYDROCHLORIDE 500 MG/1
500 TABLET ORAL 2 TIMES DAILY WITH MEALS
Qty: 180 TABLET | Refills: 0 | Status: SHIPPED | OUTPATIENT
Start: 2022-01-12 | End: 2022-04-26

## 2022-01-12 NOTE — PROGRESS NOTES
SUBJECTIVE:      Patient ID: Tsering Carlos is a 46 y.o. female.    Chief Complaint: Establish Care    Tsering is here today for follow-up and medication refills.  She is a new patient to me and is transitioning her previous PCP who left the practice.  She does have a history type 2 diabetes, hyperlipidemia, hypertension, hypothyroidism, and anxiety.  She is taking her medications as prescribed daily without side effects or complaints.  She is overdue for labs since 2020 and she understands that we will need to get lab work in order to continue providing refills to her.  A1c was below goal in July of 2020. Patient is overdue for multiple health maintenance and screenings which will be addressed in the next few visits.  Patient admits she is not checking her blood pressure or blood sugar at home.  She is compliant with her diet most of the time with the exception of the holidays.     She did have a history of COVID-19 infection and declines the recommended vaccination at this time.        Family History   Problem Relation Age of Onset    Hypertension Mother     Stroke Mother     Heart disease Mother     Diabetes Mother     Diabetes Father     Stroke Father     Heart disease Father     Depression Father       Social History     Socioeconomic History    Marital status:    Occupational History    Occupation: business owner   Tobacco Use    Smoking status: Never Smoker    Smokeless tobacco: Never Used   Substance and Sexual Activity    Alcohol use: No    Drug use: No     Current Outpatient Medications   Medication Sig Dispense Refill    LORazepam (ATIVAN) 1 MG tablet Take 1 mg by mouth 2 (two) times daily as needed for Anxiety.      nitroGLYCERIN (NITROSTAT) 0.4 MG SL tablet Place 0.4 mg under the tongue every 5 (five) minutes as needed for Chest pain.      atorvastatin (LIPITOR) 20 MG tablet Take 1 tablet (20 mg total) by mouth once daily. 90 tablet 0    dulaglutide (TRULICITY) 0.75  mg/0.5 mL pen injector INJECT 0.5 MILLILITERS (0.75MG) INTO THE SKIN EVERY 7 DAYS 4 pen 2    EScitalopram oxalate (LEXAPRO) 10 MG tablet Take 1 tablet (10 mg total) by mouth once daily. 90 tablet 0    levothyroxine (SYNTHROID) 100 MCG tablet Take 1 tablet (100 mcg total) by mouth once daily. 90 tablet 0    lisinopriL 10 MG tablet Take 1 tablet (10 mg total) by mouth once daily. 90 tablet 0    metFORMIN (GLUCOPHAGE) 500 MG tablet Take 1 tablet (500 mg total) by mouth 2 (two) times daily with meals. 180 tablet 0     No current facility-administered medications for this visit.     Review of patient's allergies indicates:  No Known Allergies   Past Medical History:   Diagnosis Date    Anxiety     Breast cyst     4 mm cyst according to mammo, repeat test 5/2016    Diabetes mellitus, type 2     High calcium levels     Hypertension     Hypertriglyceridemia     Hypothyroidism     Umbilical hernia     since 2009, worse after giving birth     Past Surgical History:   Procedure Laterality Date    ADENOIDECTOMY  1985    HERNIA REPAIR  03/21/2016    INSERTION OF CONTRACEPTIVE CAPSULE  2012    TONSILLECTOMY  1985    TYMPANOSTOMY TUBE PLACEMENT  1985       Review of Systems   Constitutional: Negative for appetite change, chills, fatigue, fever and unexpected weight change.   HENT: Negative for congestion, ear pain and sore throat.    Eyes: Negative for photophobia, pain and visual disturbance.   Respiratory: Negative for cough and shortness of breath.    Cardiovascular: Negative for chest pain, palpitations and leg swelling.   Gastrointestinal: Negative for abdominal pain, diarrhea, nausea and vomiting.   Endocrine: Negative for cold intolerance, heat intolerance, polydipsia and polyuria.   Genitourinary: Negative for difficulty urinating, dysuria, frequency and hematuria.   Musculoskeletal: Negative for arthralgias and myalgias.   Skin: Negative for rash.   Neurological: Negative for dizziness, weakness and  "headaches.   Hematological: Negative for adenopathy. Does not bruise/bleed easily.   Psychiatric/Behavioral: Negative for dysphoric mood. The patient is not nervous/anxious.       OBJECTIVE:      Vitals:    01/12/22 1114   BP: 132/80   BP Location: Right arm   Patient Position: Sitting   BP Method: Large (Manual)   Pulse: 100   Resp: 18   Temp: 98.4 °F (36.9 °C)   TempSrc: Oral   SpO2: 98%   Weight: 90.7 kg (199 lb 14.4 oz)   Height: 5' 4" (1.626 m)     Physical Exam  Vitals and nursing note reviewed.   Constitutional:       General: She is not in acute distress.     Appearance: Normal appearance. She is well-developed. She is obese. She is not ill-appearing or diaphoretic.   HENT:      Head: Normocephalic and atraumatic.   Eyes:      Pupils: Pupils are equal, round, and reactive to light.   Neck:      Thyroid: No thyroid mass or thyromegaly.   Cardiovascular:      Rate and Rhythm: Normal rate and regular rhythm.      Heart sounds: Normal heart sounds. No murmur heard.      Pulmonary:      Effort: Pulmonary effort is normal. No respiratory distress.      Breath sounds: Normal breath sounds. No wheezing or rales.   Abdominal:      General: Bowel sounds are normal.      Palpations: Abdomen is soft.      Tenderness: There is no abdominal tenderness.   Musculoskeletal:         General: Normal range of motion.      Cervical back: Normal range of motion and neck supple.      Right lower leg: No edema.      Left lower leg: No edema.   Lymphadenopathy:      Cervical: No cervical adenopathy.   Skin:     General: Skin is warm and dry.      Coloration: Skin is not jaundiced or pale.   Neurological:      Mental Status: She is alert and oriented to person, place, and time.   Psychiatric:         Mood and Affect: Mood normal.         Behavior: Behavior normal.        Assessment:       1. Diabetes mellitus without complication    2. Subclinical hypothyroidism    3. Benign hypertension    4. Mixed hyperlipidemia    5. Anxiety    6. " Screening for viral disease        Plan:       Diabetes mellitus without complication  -     Lipid Panel; Future; Expected date: 01/12/2022  -     Comprehensive Metabolic Panel; Future; Expected date: 01/12/2022  -     Urinalysis; Future; Expected date: 01/12/2022  -     Microalbumin/Creatinine Ratio, Urine; Future; Expected date: 01/12/2022  -     Hemoglobin A1C; Future; Expected date: 01/12/2022  -     dulaglutide (TRULICITY) 0.75 mg/0.5 mL pen injector; INJECT 0.5 MILLILITERS (0.75MG) INTO THE SKIN EVERY 7 DAYS  Dispense: 4 pen; Refill: 2  -     metFORMIN (GLUCOPHAGE) 500 MG tablet; Take 1 tablet (500 mg total) by mouth 2 (two) times daily with meals.  Dispense: 180 tablet; Refill: 0    Subclinical hypothyroidism  -     TSH; Future; Expected date: 01/12/2022  -     levothyroxine (SYNTHROID) 100 MCG tablet; Take 1 tablet (100 mcg total) by mouth once daily.  Dispense: 90 tablet; Refill: 0    Benign hypertension  -     CBC Auto Differential; Future; Expected date: 01/12/2022  -     Lipid Panel; Future; Expected date: 01/12/2022  -     Comprehensive Metabolic Panel; Future; Expected date: 01/12/2022  -     TSH; Future; Expected date: 01/12/2022  -     lisinopriL 10 MG tablet; Take 1 tablet (10 mg total) by mouth once daily.  Dispense: 90 tablet; Refill: 0    Mixed hyperlipidemia  -     Lipid Panel; Future; Expected date: 01/12/2022  -     Comprehensive Metabolic Panel; Future; Expected date: 01/12/2022  -     atorvastatin (LIPITOR) 20 MG tablet; Take 1 tablet (20 mg total) by mouth once daily.  Dispense: 90 tablet; Refill: 0    Anxiety  -     EScitalopram oxalate (LEXAPRO) 10 MG tablet; Take 1 tablet (10 mg total) by mouth once daily.  Dispense: 90 tablet; Refill: 0    Screening for viral disease  -     Hepatitis C Antibody; Future; Expected date: 01/12/2022  -     HIV 1/2 Ag/Ab (4th Gen) w/Refl; Future; Expected date: 01/12/2022      * discussed compliance with medications, lab work, and clinic visits at least  twice a year; patient is agreeable to plan and will return in 4 weeks for lab review and annual    Follow up in about 1 month (around 2/12/2022) for diabetes, HTN, labs .      1/12/2022 JOSE Epperson, FNP    This note was created using [employer] voice recognition software that occasionally misinterprets phrases or words.

## 2022-02-08 ENCOUNTER — TELEPHONE (OUTPATIENT)
Dept: FAMILY MEDICINE | Facility: CLINIC | Age: 47
End: 2022-02-08
Payer: MEDICAID

## 2022-02-15 ENCOUNTER — LAB VISIT (OUTPATIENT)
Dept: LAB | Facility: HOSPITAL | Age: 47
End: 2022-02-15
Attending: NURSE PRACTITIONER
Payer: MEDICAID

## 2022-02-15 DIAGNOSIS — Z11.59 SCREENING FOR VIRAL DISEASE: ICD-10-CM

## 2022-02-15 DIAGNOSIS — E03.8 SUBCLINICAL HYPOTHYROIDISM: ICD-10-CM

## 2022-02-15 DIAGNOSIS — E11.9 DIABETES MELLITUS WITHOUT COMPLICATION: ICD-10-CM

## 2022-02-15 DIAGNOSIS — E78.2 MIXED HYPERLIPIDEMIA: ICD-10-CM

## 2022-02-15 DIAGNOSIS — I10 BENIGN HYPERTENSION: ICD-10-CM

## 2022-02-15 LAB
ALBUMIN SERPL BCP-MCNC: 4.3 G/DL (ref 3.5–5.2)
ALBUMIN/CREAT UR: 36.6 UG/MG (ref 0–30)
ALP SERPL-CCNC: 45 U/L (ref 55–135)
ALT SERPL W/O P-5'-P-CCNC: 16 U/L (ref 10–44)
ANION GAP SERPL CALC-SCNC: 13 MMOL/L (ref 8–16)
AST SERPL-CCNC: 20 U/L (ref 10–40)
BACTERIA #/AREA URNS HPF: ABNORMAL /HPF
BASOPHILS # BLD AUTO: 0.06 K/UL (ref 0–0.2)
BASOPHILS NFR BLD: 0.6 % (ref 0–1.9)
BILIRUB SERPL-MCNC: 0.9 MG/DL (ref 0.1–1)
BILIRUB UR QL STRIP: NEGATIVE
BUN SERPL-MCNC: 11 MG/DL (ref 6–20)
CALCIUM SERPL-MCNC: 9 MG/DL (ref 8.7–10.5)
CHLORIDE SERPL-SCNC: 97 MMOL/L (ref 95–110)
CHOLEST SERPL-MCNC: 224 MG/DL (ref 120–199)
CHOLEST/HDLC SERPL: 5.3 {RATIO} (ref 2–5)
CLARITY UR: ABNORMAL
CO2 SERPL-SCNC: 24 MMOL/L (ref 23–29)
COLOR UR: ABNORMAL
CREAT SERPL-MCNC: 0.6 MG/DL (ref 0.5–1.4)
CREAT UR-MCNC: 171 MG/DL (ref 15–325)
DIFFERENTIAL METHOD: ABNORMAL
EOSINOPHIL # BLD AUTO: 0.6 K/UL (ref 0–0.5)
EOSINOPHIL NFR BLD: 6.1 % (ref 0–8)
ERYTHROCYTE [DISTWIDTH] IN BLOOD BY AUTOMATED COUNT: 13.1 % (ref 11.5–14.5)
EST. GFR  (AFRICAN AMERICAN): >60 ML/MIN/1.73 M^2
EST. GFR  (NON AFRICAN AMERICAN): >60 ML/MIN/1.73 M^2
GLUCOSE SERPL-MCNC: 167 MG/DL (ref 70–110)
GLUCOSE UR QL STRIP: NEGATIVE
HCT VFR BLD AUTO: 39.5 % (ref 37–48.5)
HDLC SERPL-MCNC: 42 MG/DL (ref 40–75)
HDLC SERPL: 18.8 % (ref 20–50)
HGB BLD-MCNC: 12.8 G/DL (ref 12–16)
HGB UR QL STRIP: NEGATIVE
HYALINE CASTS #/AREA URNS LPF: 13 /LPF
IMM GRANULOCYTES # BLD AUTO: 0.03 K/UL (ref 0–0.04)
IMM GRANULOCYTES NFR BLD AUTO: 0.3 % (ref 0–0.5)
KETONES UR QL STRIP: ABNORMAL
LDLC SERPL CALC-MCNC: ABNORMAL MG/DL (ref 63–159)
LEUKOCYTE ESTERASE UR QL STRIP: ABNORMAL
LYMPHOCYTES # BLD AUTO: 3.5 K/UL (ref 1–4.8)
LYMPHOCYTES NFR BLD: 34.9 % (ref 18–48)
MCH RBC QN AUTO: 28.8 PG (ref 27–31)
MCHC RBC AUTO-ENTMCNC: 32.4 G/DL (ref 32–36)
MCV RBC AUTO: 89 FL (ref 82–98)
MICROALBUMIN UR DL<=1MG/L-MCNC: 62.6 UG/ML
MICROSCOPIC COMMENT: ABNORMAL
MONOCYTES # BLD AUTO: 0.5 K/UL (ref 0.3–1)
MONOCYTES NFR BLD: 4.9 % (ref 4–15)
NEUTROPHILS # BLD AUTO: 5.3 K/UL (ref 1.8–7.7)
NEUTROPHILS NFR BLD: 53.2 % (ref 38–73)
NITRITE UR QL STRIP: NEGATIVE
NONHDLC SERPL-MCNC: 182 MG/DL
NRBC BLD-RTO: 0 /100 WBC
PH UR STRIP: 6 [PH] (ref 5–8)
PLATELET # BLD AUTO: 254 K/UL (ref 150–450)
PMV BLD AUTO: 9.2 FL (ref 9.2–12.9)
POTASSIUM SERPL-SCNC: 3.8 MMOL/L (ref 3.5–5.1)
PROT SERPL-MCNC: 7.5 G/DL (ref 6–8.4)
PROT UR QL STRIP: ABNORMAL
RBC # BLD AUTO: 4.45 M/UL (ref 4–5.4)
RBC #/AREA URNS HPF: 1 /HPF (ref 0–4)
SODIUM SERPL-SCNC: 134 MMOL/L (ref 136–145)
SP GR UR STRIP: 1.02 (ref 1–1.03)
SQUAMOUS #/AREA URNS HPF: 8 /HPF
TRIGL SERPL-MCNC: 662 MG/DL (ref 30–150)
TSH SERPL DL<=0.005 MIU/L-ACNC: 3.07 UIU/ML (ref 0.34–5.6)
URN SPEC COLLECT METH UR: ABNORMAL
UROBILINOGEN UR STRIP-ACNC: NEGATIVE EU/DL
WBC # BLD AUTO: 9.92 K/UL (ref 3.9–12.7)
WBC #/AREA URNS HPF: 5 /HPF (ref 0–5)

## 2022-02-15 PROCEDURE — 85025 COMPLETE CBC W/AUTO DIFF WBC: CPT | Performed by: NURSE PRACTITIONER

## 2022-02-15 PROCEDURE — 84443 ASSAY THYROID STIM HORMONE: CPT | Performed by: NURSE PRACTITIONER

## 2022-02-15 PROCEDURE — 80053 COMPREHEN METABOLIC PANEL: CPT | Performed by: NURSE PRACTITIONER

## 2022-02-15 PROCEDURE — 36415 COLL VENOUS BLD VENIPUNCTURE: CPT | Performed by: NURSE PRACTITIONER

## 2022-02-15 PROCEDURE — 81001 URINALYSIS AUTO W/SCOPE: CPT | Performed by: NURSE PRACTITIONER

## 2022-02-15 PROCEDURE — 83036 HEMOGLOBIN GLYCOSYLATED A1C: CPT | Performed by: NURSE PRACTITIONER

## 2022-02-15 PROCEDURE — 82570 ASSAY OF URINE CREATININE: CPT | Performed by: NURSE PRACTITIONER

## 2022-02-15 PROCEDURE — 80061 LIPID PANEL: CPT | Performed by: NURSE PRACTITIONER

## 2022-02-15 PROCEDURE — 87389 HIV-1 AG W/HIV-1&-2 AB AG IA: CPT | Performed by: NURSE PRACTITIONER

## 2022-02-15 PROCEDURE — 86803 HEPATITIS C AB TEST: CPT | Performed by: NURSE PRACTITIONER

## 2022-02-16 LAB
ESTIMATED AVG GLUCOSE: 200 MG/DL (ref 68–131)
HBA1C MFR BLD: 8.6 % (ref 4.5–6.2)

## 2022-02-17 ENCOUNTER — TELEPHONE (OUTPATIENT)
Dept: FAMILY MEDICINE | Facility: CLINIC | Age: 47
End: 2022-02-17
Payer: MEDICAID

## 2022-02-17 LAB
HCV AB S/CO SERPL IA: <0.1 S/CO RATIO (ref 0–0.9)
HIV 1+2 AB+HIV1 P24 AG SERPL QL IA: NON REACTIVE

## 2022-02-17 NOTE — TELEPHONE ENCOUNTER
----- Message from ARIEL Kingston sent at 2/17/2022  1:32 PM CST -----  Please notify patient that her results are abnormal. Please verify patient has a follow-up appointment to review the results.

## 2022-02-17 NOTE — PROGRESS NOTES
Please notify patient that her results are abnormal. Please verify patient has a follow-up appointment to review the results.

## 2022-03-08 ENCOUNTER — OFFICE VISIT (OUTPATIENT)
Dept: FAMILY MEDICINE | Facility: CLINIC | Age: 47
End: 2022-03-08
Payer: MEDICAID

## 2022-03-08 VITALS
HEIGHT: 64 IN | DIASTOLIC BLOOD PRESSURE: 84 MMHG | SYSTOLIC BLOOD PRESSURE: 126 MMHG | OXYGEN SATURATION: 96 % | TEMPERATURE: 98 F | BODY MASS INDEX: 34.28 KG/M2 | RESPIRATION RATE: 18 BRPM | HEART RATE: 93 BPM | WEIGHT: 200.81 LBS

## 2022-03-08 DIAGNOSIS — Z12.31 SCREENING MAMMOGRAM FOR BREAST CANCER: ICD-10-CM

## 2022-03-08 DIAGNOSIS — E11.65 TYPE 2 DIABETES MELLITUS WITH HYPERGLYCEMIA, WITHOUT LONG-TERM CURRENT USE OF INSULIN: Primary | ICD-10-CM

## 2022-03-08 DIAGNOSIS — I10 BENIGN HYPERTENSION: ICD-10-CM

## 2022-03-08 DIAGNOSIS — E66.09 CLASS 1 OBESITY DUE TO EXCESS CALORIES WITH SERIOUS COMORBIDITY AND BODY MASS INDEX (BMI) OF 34.0 TO 34.9 IN ADULT: ICD-10-CM

## 2022-03-08 DIAGNOSIS — E78.2 MIXED HYPERLIPIDEMIA: ICD-10-CM

## 2022-03-08 DIAGNOSIS — E03.8 SUBCLINICAL HYPOTHYROIDISM: ICD-10-CM

## 2022-03-08 DIAGNOSIS — Z12.11 SCREENING FOR COLON CANCER: ICD-10-CM

## 2022-03-08 PROCEDURE — 1160F RVW MEDS BY RX/DR IN RCRD: CPT | Mod: ,,, | Performed by: NURSE PRACTITIONER

## 2022-03-08 PROCEDURE — 3074F SYST BP LT 130 MM HG: CPT | Mod: ,,, | Performed by: NURSE PRACTITIONER

## 2022-03-08 PROCEDURE — 3060F POS MICROALBUMINURIA REV: CPT | Mod: ,,, | Performed by: NURSE PRACTITIONER

## 2022-03-08 PROCEDURE — 3060F PR POS MICROALBUMINURIA RESULT DOCUMENTED/REVIEW: ICD-10-PCS | Mod: ,,, | Performed by: NURSE PRACTITIONER

## 2022-03-08 PROCEDURE — 3066F PR DOCUMENTATION OF TREATMENT FOR NEPHROPATHY: ICD-10-PCS | Mod: ,,, | Performed by: NURSE PRACTITIONER

## 2022-03-08 PROCEDURE — 3008F PR BODY MASS INDEX (BMI) DOCUMENTED: ICD-10-PCS | Mod: ,,, | Performed by: NURSE PRACTITIONER

## 2022-03-08 PROCEDURE — 1160F PR REVIEW ALL MEDS BY PRESCRIBER/CLIN PHARMACIST DOCUMENTED: ICD-10-PCS | Mod: ,,, | Performed by: NURSE PRACTITIONER

## 2022-03-08 PROCEDURE — 3079F DIAST BP 80-89 MM HG: CPT | Mod: ,,, | Performed by: NURSE PRACTITIONER

## 2022-03-08 PROCEDURE — 99214 OFFICE O/P EST MOD 30 MIN: CPT | Mod: S$PBB,,, | Performed by: NURSE PRACTITIONER

## 2022-03-08 PROCEDURE — 3066F NEPHROPATHY DOC TX: CPT | Mod: ,,, | Performed by: NURSE PRACTITIONER

## 2022-03-08 PROCEDURE — 99215 OFFICE O/P EST HI 40 MIN: CPT | Performed by: NURSE PRACTITIONER

## 2022-03-08 PROCEDURE — 3008F BODY MASS INDEX DOCD: CPT | Mod: ,,, | Performed by: NURSE PRACTITIONER

## 2022-03-08 PROCEDURE — 3079F PR MOST RECENT DIASTOLIC BLOOD PRESSURE 80-89 MM HG: ICD-10-PCS | Mod: ,,, | Performed by: NURSE PRACTITIONER

## 2022-03-08 PROCEDURE — 4010F ACE/ARB THERAPY RXD/TAKEN: CPT | Mod: ,,, | Performed by: NURSE PRACTITIONER

## 2022-03-08 PROCEDURE — 99214 PR OFFICE/OUTPT VISIT, EST, LEVL IV, 30-39 MIN: ICD-10-PCS | Mod: S$PBB,,, | Performed by: NURSE PRACTITIONER

## 2022-03-08 PROCEDURE — 4010F PR ACE/ARB THEARPY RXD/TAKEN: ICD-10-PCS | Mod: ,,, | Performed by: NURSE PRACTITIONER

## 2022-03-08 PROCEDURE — 3052F PR MOST RECENT HEMOGLOBIN A1C LEVEL 8.0 - < 9.0%: ICD-10-PCS | Mod: ,,, | Performed by: NURSE PRACTITIONER

## 2022-03-08 PROCEDURE — 3052F HG A1C>EQUAL 8.0%<EQUAL 9.0%: CPT | Mod: ,,, | Performed by: NURSE PRACTITIONER

## 2022-03-08 PROCEDURE — 3074F PR MOST RECENT SYSTOLIC BLOOD PRESSURE < 130 MM HG: ICD-10-PCS | Mod: ,,, | Performed by: NURSE PRACTITIONER

## 2022-03-08 NOTE — PROGRESS NOTES
SUBJECTIVE:      Patient ID: Tsering Carlos is a 46 y.o. female.    Chief Complaint: Results    Tsering is here for follow up on DM, hypothyroidism, HTN and HLD. She had labs done before her visit- reviewed today. She had been off her medications before labs were done. Admits to cheating a lot with her diet during [place].     Due for screening mammogram- denies breast complaints  Due for pap with gyn- will schedule with Dr. Philip   Due for colon cancer screening; denies fam hx of colon cancer or changes in bowel habits     Diabetes  She presents for her follow-up diabetic visit. She has type 2 diabetes mellitus. Her disease course has been worsening. Hypoglycemia symptoms include nervousness/anxiousness (stable ). Pertinent negatives for hypoglycemia include no confusion, dizziness, headaches or sweats. Pertinent negatives for diabetes include no blurred vision, no chest pain, no fatigue, no foot paresthesias, no foot ulcerations, no polydipsia, no polyphagia, no polyuria, no visual change, no weakness and no weight loss. There are no hypoglycemic complications. Symptoms are worsening. There are no diabetic complications. Risk factors for coronary artery disease include hypertension, obesity, sedentary lifestyle, dyslipidemia, family history and diabetes mellitus. Current diabetic treatment includes oral agent (monotherapy) (Trulicity ). She is compliant with treatment all of the time. She is following a generally healthy diet. When asked about meal planning, she reported none. She rarely participates in exercise. Home blood sugar record trend: not checking  An ACE inhibitor/angiotensin II receptor blocker is being taken. She does not see a podiatrist.Eye exam is not current.   Hypertension  This is a chronic problem. The current episode started more than 1 year ago. The problem is controlled. Associated symptoms include anxiety. Pertinent negatives include no blurred vision, chest pain, headaches,  malaise/fatigue, neck pain, palpitations, peripheral edema, shortness of breath or sweats. There are no associated agents to hypertension. Risk factors for coronary artery disease include obesity, sedentary lifestyle, dyslipidemia, diabetes mellitus and family history. Past treatments include ACE inhibitors. The current treatment provides moderate improvement. Compliance problems include exercise and diet.  Identifiable causes of hypertension include a thyroid problem. There is no history of chronic renal disease.   Hyperlipidemia  This is a chronic problem. The current episode started more than 1 year ago. The problem is uncontrolled. Recent lipid tests were reviewed and are high. Exacerbating diseases include diabetes, hypothyroidism and obesity. She has no history of chronic renal disease or liver disease. There are no known factors aggravating her hyperlipidemia. Pertinent negatives include no chest pain, leg pain, myalgias or shortness of breath. Current antihyperlipidemic treatment includes statins. Compliance problems include adherence to diet and adherence to exercise.  Risk factors for coronary artery disease include a sedentary lifestyle, obesity, hypertension, diabetes mellitus, dyslipidemia and family history.       Family History   Problem Relation Age of Onset    Hypertension Mother     Stroke Mother     Heart disease Mother     Diabetes Mother     Diabetes Father     Stroke Father     Heart disease Father     Depression Father       Social History     Socioeconomic History    Marital status:    Occupational History    Occupation: business owner   Tobacco Use    Smoking status: Never Smoker    Smokeless tobacco: Never Used   Substance and Sexual Activity    Alcohol use: No    Drug use: No     Current Outpatient Medications   Medication Sig Dispense Refill    atorvastatin (LIPITOR) 20 MG tablet Take 1 tablet (20 mg total) by mouth once daily. 90 tablet 0    dulaglutide  (TRULICITY) 0.75 mg/0.5 mL pen injector INJECT 0.5 MILLILITERS (0.75MG) INTO THE SKIN EVERY 7 DAYS 4 pen 2    EScitalopram oxalate (LEXAPRO) 10 MG tablet Take 1 tablet (10 mg total) by mouth once daily. 90 tablet 0    levothyroxine (SYNTHROID) 100 MCG tablet Take 1 tablet (100 mcg total) by mouth once daily. 90 tablet 0    lisinopriL 10 MG tablet Take 1 tablet (10 mg total) by mouth once daily. 90 tablet 0    LORazepam (ATIVAN) 1 MG tablet Take 1 mg by mouth 2 (two) times daily as needed for Anxiety.      metFORMIN (GLUCOPHAGE) 500 MG tablet Take 1 tablet (500 mg total) by mouth 2 (two) times daily with meals. 180 tablet 0    nitroGLYCERIN (NITROSTAT) 0.4 MG SL tablet Place 0.4 mg under the tongue every 5 (five) minutes as needed for Chest pain.      blood sugar diagnostic Strp Use 1 strip twice daily to check blood sugar 50 strip 5     No current facility-administered medications for this visit.     Review of patient's allergies indicates:  No Known Allergies   Past Medical History:   Diagnosis Date    Anxiety     Breast cyst     4 mm cyst according to mammo, repeat test 5/2016    Diabetes mellitus, type 2     High calcium levels     Hypertension     Hypertriglyceridemia     Hypothyroidism     Umbilical hernia     since 2009, worse after giving birth     Past Surgical History:   Procedure Laterality Date    ADENOIDECTOMY  1985    HERNIA REPAIR  03/21/2016    INSERTION OF CONTRACEPTIVE CAPSULE  2012    TONSILLECTOMY  1985    TYMPANOSTOMY TUBE PLACEMENT  1985       Review of Systems   Constitutional: Negative for activity change, chills, fatigue, fever, malaise/fatigue, unexpected weight change and weight loss.   HENT: Negative for hearing loss, rhinorrhea and trouble swallowing.    Eyes: Negative for blurred vision, photophobia, discharge and visual disturbance.   Respiratory: Negative for chest tightness, shortness of breath and wheezing.    Cardiovascular: Negative for chest pain, palpitations  "and leg swelling.   Gastrointestinal: Negative for abdominal pain, blood in stool, constipation, diarrhea, nausea and vomiting.   Endocrine: Negative for cold intolerance, heat intolerance, polydipsia, polyphagia and polyuria.   Genitourinary: Negative for difficulty urinating, dysuria, hematuria and menstrual problem.   Musculoskeletal: Negative for arthralgias, joint swelling, myalgias and neck pain.   Skin: Negative for color change and wound.   Allergic/Immunologic: Positive for environmental allergies.   Neurological: Negative for dizziness, weakness, numbness and headaches.   Psychiatric/Behavioral: Negative for confusion, dysphoric mood and suicidal ideas. The patient is nervous/anxious (stable ).       OBJECTIVE:      Vitals:    03/08/22 1120   BP: 126/84   BP Location: Right arm   Patient Position: Sitting   BP Method: Large (Manual)   Pulse: 93   Resp: 18   Temp: 98.2 °F (36.8 °C)   TempSrc: Oral   SpO2: 96%   Weight: 91.1 kg (200 lb 12.8 oz)   Height: 5' 4" (1.626 m)     Physical Exam  Vitals and nursing note reviewed.   Constitutional:       General: She is not in acute distress.     Appearance: Normal appearance. She is well-developed. She is obese. She is not ill-appearing.   HENT:      Head: Normocephalic and atraumatic.      Right Ear: Tympanic membrane, ear canal and external ear normal.      Left Ear: Tympanic membrane, ear canal and external ear normal.      Nose: Nose normal.      Mouth/Throat:      Mouth: Mucous membranes are moist.   Eyes:      Pupils: Pupils are equal, round, and reactive to light.   Neck:      Thyroid: No thyroid mass or thyromegaly.   Cardiovascular:      Rate and Rhythm: Normal rate and regular rhythm.      Heart sounds: Normal heart sounds. No murmur heard.  Pulmonary:      Effort: Pulmonary effort is normal. No respiratory distress.      Breath sounds: Normal breath sounds. No wheezing, rhonchi or rales.   Abdominal:      General: Bowel sounds are normal.      Palpations: " Abdomen is soft.      Tenderness: There is no abdominal tenderness.   Musculoskeletal:         General: Normal range of motion.      Cervical back: Normal range of motion and neck supple.      Right lower leg: No edema.      Left lower leg: No edema.   Lymphadenopathy:      Cervical: No cervical adenopathy.   Skin:     General: Skin is warm and dry.      Coloration: Skin is not jaundiced or pale.   Neurological:      Mental Status: She is alert and oriented to person, place, and time.   Psychiatric:         Mood and Affect: Mood normal.         Behavior: Behavior normal.         Thought Content: Thought content normal.         Judgment: Judgment normal.        Assessment:       1. Type 2 diabetes mellitus with hyperglycemia, without long-term current use of insulin    2. Subclinical hypothyroidism    3. Benign hypertension    4. Mixed hyperlipidemia    5. Screening mammogram for breast cancer    6. Screening for colon cancer    7. Class 1 obesity due to excess calories with serious comorbidity and body mass index (BMI) of 34.0 to 34.9 in adult        Plan:       Type 2 diabetes mellitus with hyperglycemia, without long-term current use of insulin  -     blood sugar diagnostic Strp; Use 1 strip twice daily to check blood sugar  Dispense: 50 strip; Refill: 5  -     Comprehensive Metabolic Panel; Future; Expected date: 05/06/2022  -     Lipid Panel; Future; Expected date: 05/06/2022  -     Hemoglobin A1C; Future; Expected date: 05/06/2022  -cont meds and diet; start checking BS daily and record; get eye exam done; f/u in 2-3 mo     Subclinical hypothyroidism  -     TSH; Future; Expected date: 05/06/2022  -stable     Benign hypertension   -stable    Mixed hyperlipidemia  -     Comprehensive Metabolic Panel; Future; Expected date: 05/06/2022  -     Lipid Panel; Future; Expected date: 05/06/2022  -get labs; may need increase at next visit     Screening mammogram for breast cancer  -     Mammo Digital Screening Bilat w/  Reginaldo; Future; Expected date: 03/08/2022    Screening for colon cancer  -     Ambulatory referral/consult to Gastroenterology; Future; Expected date: 03/15/2022    Class 1 obesity due to excess calories with serious comorbidity and body mass index (BMI) of 34.0 to 34.9 in adult        Follow up in about 3 months (around 6/8/2022) for f/u DM, labs, foot exam .      3/8/2022 She Basilio, JOSE, FNP    This note was created using [employer] voice recognition software that occasionally misinterprets phrases or words.

## 2022-03-21 ENCOUNTER — TELEPHONE (OUTPATIENT)
Dept: FAMILY MEDICINE | Facility: CLINIC | Age: 47
End: 2022-03-21
Payer: MEDICAID

## 2022-03-21 NOTE — TELEPHONE ENCOUNTER
----- Message from Lyndsey Cameron sent at 3/18/2022 10:10 AM CDT -----  Regarding: unscheduled exam  We have made 3 attempts to contact this pt to  schedule their Screening Mammogram and have been unable to reach them therefore we are removing this from our work queue. We just wanted to make you aware of this in case you wanted to reach out to the patient.     Thanks,   [facility] Centralized Scheduling

## 2022-04-01 ENCOUNTER — HOSPITAL ENCOUNTER (OUTPATIENT)
Dept: RADIOLOGY | Facility: HOSPITAL | Age: 47
Discharge: HOME | End: 2022-04-01
Attending: NURSE PRACTITIONER
Payer: MEDICAID

## 2022-04-01 VITALS — HEIGHT: 64 IN | BODY MASS INDEX: 34.28 KG/M2 | WEIGHT: 200.81 LBS

## 2022-04-01 DIAGNOSIS — Z12.31 SCREENING MAMMOGRAM FOR BREAST CANCER: ICD-10-CM

## 2022-04-01 PROCEDURE — 77063 BREAST TOMOSYNTHESIS BI: CPT | Mod: TC,PO

## 2022-05-09 ENCOUNTER — PATIENT MESSAGE (OUTPATIENT)
Dept: FAMILY MEDICINE | Facility: CLINIC | Age: 47
End: 2022-05-09

## 2023-02-16 NOTE — PROGRESS NOTES
SUBJECTIVE:      Patient ID: Tsering Carlos is a 47 y.o. female.    Chief Complaint: Establish Care, Diabetes, Hypertension, Hyperlipidemia, and Hypothyroidism    Patient is here today to establish care. She has a history of DM, hypothyroidism, HTN and HLD. Utd with mammogram- Follows with Dr. Philip for gyn  Utd with colon cancer screening. She is due for labs. Has been off all medication for 3 months. She is interested in a referral to bariatrics for eval     Diabetes  She presents for her follow-up diabetic visit. She has type 2 diabetes mellitus. Hypoglycemia symptoms include nervousness/anxiousness (stable ). Pertinent negatives for hypoglycemia include no confusion, dizziness, headaches, pallor, speech difficulty or sweats. Pertinent negatives for diabetes include no blurred vision, no chest pain, no fatigue, no foot paresthesias, no foot ulcerations, no polydipsia, no polyphagia, no polyuria, no visual change, no weakness and no weight loss. There are no hypoglycemic complications. There are no diabetic complications. Risk factors for coronary artery disease include hypertension, obesity, sedentary lifestyle, dyslipidemia, family history and diabetes mellitus. Current diabetic treatment includes oral agent (monotherapy) (Trulicity ). She is following a generally healthy diet. When asked about meal planning, she reported none. She rarely participates in exercise. Home blood sugar record trend: not checking  An ACE inhibitor/angiotensin II receptor blocker is being taken. She does not see a podiatrist.Eye exam is not current.   Hypertension  This is a chronic problem. The current episode started more than 1 year ago. The problem is controlled. Associated symptoms include anxiety. Pertinent negatives include no blurred vision, chest pain, headaches, malaise/fatigue, neck pain, palpitations, peripheral edema, shortness of breath or sweats. There are no associated agents to hypertension. Risk factors for  coronary artery disease include obesity, sedentary lifestyle, dyslipidemia, diabetes mellitus and family history. Past treatments include ACE inhibitors. The current treatment provides moderate improvement. Compliance problems include exercise and diet.  Identifiable causes of hypertension include a thyroid problem. There is no history of chronic renal disease.   Hyperlipidemia  This is a chronic problem. The current episode started more than 1 year ago. Exacerbating diseases include diabetes, hypothyroidism and obesity. She has no history of chronic renal disease or liver disease. There are no known factors aggravating her hyperlipidemia. Pertinent negatives include no chest pain, leg pain, myalgias or shortness of breath. Current antihyperlipidemic treatment includes statins. Compliance problems include adherence to diet and adherence to exercise.  Risk factors for coronary artery disease include a sedentary lifestyle, obesity, hypertension, diabetes mellitus, dyslipidemia and family history.   Anxiety  Presents for follow-up visit. Symptoms include excessive worry and nervous/anxious behavior (stable ). Patient reports no chest pain, confusion, dizziness, palpitations, shortness of breath or suicidal ideas. Symptoms occur occasionally. The severity of symptoms is mild. The quality of sleep is good.     Compliance with medications is %.     Past Surgical History:   Procedure Laterality Date    ADENOIDECTOMY  1985    HERNIA REPAIR  03/21/2016    INSERTION OF CONTRACEPTIVE CAPSULE  2012    TONSILLECTOMY  1985    TYMPANOSTOMY TUBE PLACEMENT  1985     Family History   Problem Relation Age of Onset    Hypertension Mother     Stroke Mother     Heart disease Mother     Diabetes Mother     Diabetes Father     Stroke Father     Heart disease Father     Depression Father       Social History     Socioeconomic History    Marital status:    Occupational History    Occupation: business owner   Tobacco Use     Smoking status: Never    Smokeless tobacco: Never   Substance and Sexual Activity    Alcohol use: No    Drug use: No     Current Outpatient Medications   Medication Sig Dispense Refill    LORazepam (ATIVAN) 1 MG tablet Take 1 mg by mouth 2 (two) times daily as needed for Anxiety.      nitroGLYCERIN (NITROSTAT) 0.4 MG SL tablet Place 0.4 mg under the tongue every 5 (five) minutes as needed for Chest pain.      atorvastatin (LIPITOR) 20 MG tablet Take 1 tablet (20 mg total) by mouth once daily. 90 tablet 0    blood sugar diagnostic Strp Use 1 strip twice daily to check blood sugar 50 strip 5    dulaglutide (TRULICITY) 0.75 mg/0.5 mL pen injector Inject 0.75 mg into the skin every 7 days. 4 pen 0    EScitalopram oxalate (LEXAPRO) 10 MG tablet Take 1 tablet (10 mg total) by mouth once daily. 90 tablet 0    levothyroxine (SYNTHROID) 100 MCG tablet Take 1 tablet (100 mcg total) by mouth once daily. 90 tablet 0    lisinopriL 10 MG tablet Take 1 tablet (10 mg total) by mouth once daily. 90 tablet 0    metFORMIN (GLUCOPHAGE) 500 MG tablet Take 1 tablet (500 mg total) by mouth 2 (two) times daily with meals. 180 tablet 0     No current facility-administered medications for this visit.     Review of patient's allergies indicates:  No Known Allergies   Past Medical History:   Diagnosis Date    Anxiety     Breast cyst     4 mm cyst according to mammo, repeat test 5/2016    Diabetes mellitus, type 2     High calcium levels     Hypertension     Hypertriglyceridemia     Hypothyroidism     Umbilical hernia     since 2009, worse after giving birth     Past Surgical History:   Procedure Laterality Date    ADENOIDECTOMY  1985    HERNIA REPAIR  03/21/2016    INSERTION OF CONTRACEPTIVE CAPSULE  2012    TONSILLECTOMY  1985    TYMPANOSTOMY TUBE PLACEMENT  1985       Review of Systems   Constitutional:  Negative for appetite change, chills, diaphoresis, fatigue, malaise/fatigue, unexpected weight change and weight loss.   HENT:  Negative  "for ear discharge, hearing loss, trouble swallowing and voice change.    Eyes:  Negative for blurred vision, photophobia and pain.   Respiratory:  Negative for chest tightness, shortness of breath and stridor.    Cardiovascular:  Negative for chest pain and palpitations.   Gastrointestinal:  Negative for abdominal pain, blood in stool and vomiting.   Endocrine: Negative for cold intolerance, heat intolerance, polydipsia, polyphagia and polyuria.   Genitourinary:  Negative for difficulty urinating and flank pain.   Musculoskeletal:  Negative for joint swelling, myalgias, neck pain and neck stiffness.   Skin:  Negative for pallor.   Neurological:  Negative for dizziness, speech difficulty, weakness and headaches.   Hematological:  Does not bruise/bleed easily.   Psychiatric/Behavioral:  Negative for confusion, dysphoric mood, self-injury, sleep disturbance and suicidal ideas. The patient is nervous/anxious (stable ).     OBJECTIVE:      Vitals:    02/17/23 0833   BP: (!) 170/98   Pulse: 102   Temp: 98.4 °F (36.9 °C)   SpO2: 98%   Weight: 91.6 kg (202 lb)   Height: 5' 4" (1.626 m)     Physical Exam  Vitals and nursing note reviewed.   Constitutional:       General: She is not in acute distress.     Appearance: She is well-developed.   HENT:      Head: Normocephalic and atraumatic.      Right Ear: Tympanic membrane normal.      Left Ear: Tympanic membrane normal.      Nose: Nose normal.      Mouth/Throat:      Pharynx: Uvula midline.   Eyes:      General: Lids are normal.      Conjunctiva/sclera: Conjunctivae normal.      Pupils: Pupils are equal, round, and reactive to light.      Right eye: Pupil is round and reactive.      Left eye: Pupil is round and reactive.   Neck:      Thyroid: No thyromegaly.      Vascular: No JVD.      Trachea: Trachea normal.   Cardiovascular:      Rate and Rhythm: Normal rate and regular rhythm.      Pulses: Normal pulses.      Heart sounds: Normal heart sounds. No murmur heard.  Pulmonary: "      Effort: Pulmonary effort is normal. No tachypnea or respiratory distress.      Breath sounds: Normal breath sounds. No wheezing, rhonchi or rales.   Abdominal:      General: Bowel sounds are normal.      Palpations: Abdomen is soft.      Tenderness: There is no abdominal tenderness.   Musculoskeletal:         General: Normal range of motion.      Cervical back: Normal range of motion and neck supple.      Right lower leg: No edema.      Left lower leg: No edema.   Lymphadenopathy:      Cervical: No cervical adenopathy.   Skin:     General: Skin is warm and dry.      Findings: No rash.   Neurological:      Mental Status: She is alert and oriented to person, place, and time.   Psychiatric:         Mood and Affect: Mood normal.         Speech: Speech normal.         Behavior: Behavior normal. Behavior is cooperative.         Thought Content: Thought content normal.         Judgment: Judgment normal.    Last visit note, most recent available labs, and health maintenance reviewed    Assessment:       1. Diabetes mellitus without complication    2. Mixed hyperlipidemia    3. Benign hypertension    4. Hypothyroidism, unspecified type    5. BMI 34.0-34.9,adult    6. Anxiety    7. Preventative health care    8. Subclinical hypothyroidism        Plan:       Diabetes mellitus without complication  -     Hemoglobin A1C; Future; Expected date: 03/03/2023  -     Microalbumin/Creatinine Ratio, Urine; Future; Expected date: 02/24/2023  -     metFORMIN (GLUCOPHAGE) 500 MG tablet; Take 1 tablet (500 mg total) by mouth 2 (two) times daily with meals.  Dispense: 180 tablet; Refill: 0  -     dulaglutide (TRULICITY) 0.75 mg/0.5 mL pen injector; Inject 0.75 mg into the skin every 7 days.  Dispense: 4 pen; Refill: 0    Mixed hyperlipidemia  -     atorvastatin (LIPITOR) 20 MG tablet; Take 1 tablet (20 mg total) by mouth once daily.  Dispense: 90 tablet; Refill: 0    Benign hypertension  -     CBC Auto Differential; Future; Expected  date: 03/03/2023  -     Comprehensive Metabolic Panel; Future; Expected date: 03/03/2023  -     Lipid Panel; Future; Expected date: 03/03/2023  -     TSH; Future; Expected date: 03/31/2023  -     Urinalysis; Future; Expected date: 03/03/2023  -     lisinopriL 10 MG tablet; Take 1 tablet (10 mg total) by mouth once daily.  Dispense: 90 tablet; Refill: 0    Hypothyroidism, unspecified type  -     Lipid Panel; Future; Expected date: 03/03/2023  -     TSH; Future; Expected date: 03/31/2023    BMI 34.0-34.9,adult  -     Ambulatory referral/consult to General Surgery; Future; Expected date: 02/24/2023    Anxiety  -     EScitalopram oxalate (LEXAPRO) 10 MG tablet; Take 1 tablet (10 mg total) by mouth once daily.  Dispense: 90 tablet; Refill: 0    Preventative health care  -     CBC Auto Differential; Future; Expected date: 03/03/2023  -     Comprehensive Metabolic Panel; Future; Expected date: 03/03/2023  -     Lipid Panel; Future; Expected date: 03/03/2023  -     TSH; Future; Expected date: 03/31/2023  -     Urinalysis; Future; Expected date: 03/03/2023    Subclinical hypothyroidism  -     levothyroxine (SYNTHROID) 100 MCG tablet; Take 1 tablet (100 mcg total) by mouth once daily.  Dispense: 90 tablet; Refill: 0        Follow up in about 2 months (around 4/17/2023) for htn .      2/17/2023 JOSE Cervatnes, FNP

## 2023-02-17 ENCOUNTER — TELEPHONE (OUTPATIENT)
Dept: FAMILY MEDICINE | Facility: CLINIC | Age: 48
End: 2023-02-17

## 2023-02-17 ENCOUNTER — OFFICE VISIT (OUTPATIENT)
Dept: FAMILY MEDICINE | Facility: CLINIC | Age: 48
End: 2023-02-17
Payer: MEDICAID

## 2023-02-17 VITALS
HEIGHT: 64 IN | SYSTOLIC BLOOD PRESSURE: 146 MMHG | WEIGHT: 202 LBS | BODY MASS INDEX: 34.49 KG/M2 | TEMPERATURE: 98 F | HEART RATE: 102 BPM | OXYGEN SATURATION: 98 % | DIASTOLIC BLOOD PRESSURE: 94 MMHG

## 2023-02-17 DIAGNOSIS — E78.2 MIXED HYPERLIPIDEMIA: ICD-10-CM

## 2023-02-17 DIAGNOSIS — Z00.00 PREVENTATIVE HEALTH CARE: ICD-10-CM

## 2023-02-17 DIAGNOSIS — I10 BENIGN HYPERTENSION: ICD-10-CM

## 2023-02-17 DIAGNOSIS — F41.9 ANXIETY: ICD-10-CM

## 2023-02-17 DIAGNOSIS — E11.9 DIABETES MELLITUS WITHOUT COMPLICATION: Primary | ICD-10-CM

## 2023-02-17 DIAGNOSIS — E03.8 SUBCLINICAL HYPOTHYROIDISM: ICD-10-CM

## 2023-02-17 DIAGNOSIS — E03.9 HYPOTHYROIDISM, UNSPECIFIED TYPE: ICD-10-CM

## 2023-02-17 PROCEDURE — 3080F PR MOST RECENT DIASTOLIC BLOOD PRESSURE >= 90 MM HG: ICD-10-PCS | Mod: CPTII,S$GLB,, | Performed by: NURSE PRACTITIONER

## 2023-02-17 PROCEDURE — 99214 PR OFFICE/OUTPT VISIT, EST, LEVL IV, 30-39 MIN: ICD-10-PCS | Mod: S$GLB,,, | Performed by: NURSE PRACTITIONER

## 2023-02-17 PROCEDURE — 1160F PR REVIEW ALL MEDS BY PRESCRIBER/CLIN PHARMACIST DOCUMENTED: ICD-10-PCS | Mod: CPTII,S$GLB,, | Performed by: NURSE PRACTITIONER

## 2023-02-17 PROCEDURE — 3077F PR MOST RECENT SYSTOLIC BLOOD PRESSURE >= 140 MM HG: ICD-10-PCS | Mod: CPTII,S$GLB,, | Performed by: NURSE PRACTITIONER

## 2023-02-17 PROCEDURE — 3008F PR BODY MASS INDEX (BMI) DOCUMENTED: ICD-10-PCS | Mod: CPTII,S$GLB,, | Performed by: NURSE PRACTITIONER

## 2023-02-17 PROCEDURE — 1160F RVW MEDS BY RX/DR IN RCRD: CPT | Mod: CPTII,S$GLB,, | Performed by: NURSE PRACTITIONER

## 2023-02-17 PROCEDURE — 4010F ACE/ARB THERAPY RXD/TAKEN: CPT | Mod: CPTII,S$GLB,, | Performed by: NURSE PRACTITIONER

## 2023-02-17 PROCEDURE — 3080F DIAST BP >= 90 MM HG: CPT | Mod: CPTII,S$GLB,, | Performed by: NURSE PRACTITIONER

## 2023-02-17 PROCEDURE — 99214 OFFICE O/P EST MOD 30 MIN: CPT | Mod: S$GLB,,, | Performed by: NURSE PRACTITIONER

## 2023-02-17 PROCEDURE — 3008F BODY MASS INDEX DOCD: CPT | Mod: CPTII,S$GLB,, | Performed by: NURSE PRACTITIONER

## 2023-02-17 PROCEDURE — 1159F PR MEDICATION LIST DOCUMENTED IN MEDICAL RECORD: ICD-10-PCS | Mod: CPTII,S$GLB,, | Performed by: NURSE PRACTITIONER

## 2023-02-17 PROCEDURE — 3077F SYST BP >= 140 MM HG: CPT | Mod: CPTII,S$GLB,, | Performed by: NURSE PRACTITIONER

## 2023-02-17 PROCEDURE — 4010F PR ACE/ARB THEARPY RXD/TAKEN: ICD-10-PCS | Mod: CPTII,S$GLB,, | Performed by: NURSE PRACTITIONER

## 2023-02-17 PROCEDURE — 1159F MED LIST DOCD IN RCRD: CPT | Mod: CPTII,S$GLB,, | Performed by: NURSE PRACTITIONER

## 2023-02-17 RX ORDER — METFORMIN HYDROCHLORIDE 500 MG/1
500 TABLET ORAL 2 TIMES DAILY WITH MEALS
Qty: 180 TABLET | Refills: 0 | Status: SHIPPED | OUTPATIENT
Start: 2023-02-17 | End: 2023-07-03

## 2023-02-17 RX ORDER — ESCITALOPRAM OXALATE 10 MG/1
10 TABLET ORAL DAILY
Qty: 90 TABLET | Refills: 0 | Status: SHIPPED | OUTPATIENT
Start: 2023-02-17 | End: 2023-05-09 | Stop reason: SDUPTHER

## 2023-02-17 RX ORDER — DULAGLUTIDE 0.75 MG/.5ML
0.75 INJECTION, SOLUTION SUBCUTANEOUS
Qty: 4 PEN | Refills: 0 | Status: SHIPPED | OUTPATIENT
Start: 2023-02-17 | End: 2023-03-22

## 2023-02-17 RX ORDER — ATORVASTATIN CALCIUM 20 MG/1
20 TABLET, FILM COATED ORAL DAILY
Qty: 90 TABLET | Refills: 0 | Status: SHIPPED | OUTPATIENT
Start: 2023-02-17 | End: 2023-05-09 | Stop reason: SDUPTHER

## 2023-02-17 RX ORDER — LEVOTHYROXINE SODIUM 100 UG/1
100 TABLET ORAL DAILY
Qty: 90 TABLET | Refills: 0 | Status: SHIPPED | OUTPATIENT
Start: 2023-02-17 | End: 2023-05-09 | Stop reason: SDUPTHER

## 2023-02-17 RX ORDER — LISINOPRIL 10 MG/1
10 TABLET ORAL DAILY
Qty: 90 TABLET | Refills: 0 | Status: SHIPPED | OUTPATIENT
Start: 2023-02-17 | End: 2023-05-09 | Stop reason: SDUPTHER

## 2023-02-17 NOTE — TELEPHONE ENCOUNTER
GenSurg ([facility]) referral: Referral deferred: Phone and portal message notifying patient insurance is out of network for [facility] and to contact provider for in-network providers. If external referral needed to contact PCP.

## 2023-02-23 ENCOUNTER — LAB VISIT (OUTPATIENT)
Dept: LAB | Facility: HOSPITAL | Age: 48
End: 2023-02-23
Attending: NURSE PRACTITIONER
Payer: MEDICAID

## 2023-02-23 DIAGNOSIS — Z00.00 PREVENTATIVE HEALTH CARE: ICD-10-CM

## 2023-02-23 DIAGNOSIS — E03.9 HYPOTHYROIDISM, UNSPECIFIED TYPE: ICD-10-CM

## 2023-02-23 DIAGNOSIS — I10 BENIGN HYPERTENSION: ICD-10-CM

## 2023-02-23 DIAGNOSIS — E11.9 DIABETES MELLITUS WITHOUT COMPLICATION: ICD-10-CM

## 2023-02-23 LAB
ALBUMIN SERPL BCP-MCNC: 3.8 G/DL (ref 3.5–5.2)
ALBUMIN/CREAT UR: 663.1 UG/MG (ref 0–30)
ALP SERPL-CCNC: 50 U/L (ref 55–135)
ALT SERPL W/O P-5'-P-CCNC: 26 U/L (ref 10–44)
ANION GAP SERPL CALC-SCNC: 14 MMOL/L (ref 8–16)
AST SERPL-CCNC: 43 U/L (ref 10–40)
BACTERIA #/AREA URNS HPF: NEGATIVE /HPF
BASOPHILS # BLD AUTO: 0.06 K/UL (ref 0–0.2)
BASOPHILS NFR BLD: 0.7 % (ref 0–1.9)
BILIRUB SERPL-MCNC: 0.7 MG/DL (ref 0.1–1)
BILIRUB UR QL STRIP: NEGATIVE
BUN SERPL-MCNC: 10 MG/DL (ref 6–20)
CALCIUM SERPL-MCNC: 8.9 MG/DL (ref 8.7–10.5)
CHLORIDE SERPL-SCNC: 95 MMOL/L (ref 95–110)
CHOLEST SERPL-MCNC: 351 MG/DL (ref 120–199)
CHOLEST/HDLC SERPL: 9.2 {RATIO} (ref 2–5)
CLARITY UR: CLEAR
CO2 SERPL-SCNC: 23 MMOL/L (ref 23–29)
COLOR UR: YELLOW
CREAT SERPL-MCNC: 0.5 MG/DL (ref 0.5–1.4)
CREAT UR-MCNC: 168 MG/DL (ref 15–325)
DIFFERENTIAL METHOD: ABNORMAL
EOSINOPHIL # BLD AUTO: 0.3 K/UL (ref 0–0.5)
EOSINOPHIL NFR BLD: 3.9 % (ref 0–8)
ERYTHROCYTE [DISTWIDTH] IN BLOOD BY AUTOMATED COUNT: 13.1 % (ref 11.5–14.5)
EST. GFR  (NO RACE VARIABLE): >60 ML/MIN/1.73 M^2
ESTIMATED AVG GLUCOSE: 226 MG/DL (ref 68–131)
GLUCOSE SERPL-MCNC: 231 MG/DL (ref 70–110)
GLUCOSE UR QL STRIP: ABNORMAL
HBA1C MFR BLD: 9.5 % (ref 4.5–6.2)
HCT VFR BLD AUTO: 39.8 % (ref 37–48.5)
HDLC SERPL-MCNC: 38 MG/DL (ref 40–75)
HDLC SERPL: 10.8 % (ref 20–50)
HGB BLD-MCNC: 13.3 G/DL (ref 12–16)
HGB UR QL STRIP: NEGATIVE
HYALINE CASTS #/AREA URNS LPF: 8 /LPF
IMM GRANULOCYTES # BLD AUTO: 0.02 K/UL (ref 0–0.04)
IMM GRANULOCYTES NFR BLD AUTO: 0.2 % (ref 0–0.5)
KETONES UR QL STRIP: ABNORMAL
LDLC SERPL CALC-MCNC: ABNORMAL MG/DL (ref 63–159)
LEUKOCYTE ESTERASE UR QL STRIP: NEGATIVE
LYMPHOCYTES # BLD AUTO: 3 K/UL (ref 1–4.8)
LYMPHOCYTES NFR BLD: 36 % (ref 18–48)
MCH RBC QN AUTO: 29.8 PG (ref 27–31)
MCHC RBC AUTO-ENTMCNC: 33.4 G/DL (ref 32–36)
MCV RBC AUTO: 89 FL (ref 82–98)
MICROALBUMIN UR DL<=1MG/L-MCNC: 1114 UG/ML
MICROSCOPIC COMMENT: ABNORMAL
MONOCYTES # BLD AUTO: 0.6 K/UL (ref 0.3–1)
MONOCYTES NFR BLD: 6.7 % (ref 4–15)
NEUTROPHILS # BLD AUTO: 4.4 K/UL (ref 1.8–7.7)
NEUTROPHILS NFR BLD: 52.5 % (ref 38–73)
NITRITE UR QL STRIP: NEGATIVE
NONHDLC SERPL-MCNC: 313 MG/DL
NRBC BLD-RTO: 0 /100 WBC
PH UR STRIP: 6 [PH] (ref 5–8)
PLATELET # BLD AUTO: 271 K/UL (ref 150–450)
PMV BLD AUTO: 9 FL (ref 9.2–12.9)
POTASSIUM SERPL-SCNC: 3.9 MMOL/L (ref 3.5–5.1)
PROT SERPL-MCNC: 7.4 G/DL (ref 6–8.4)
PROT UR QL STRIP: ABNORMAL
RBC # BLD AUTO: 4.47 M/UL (ref 4–5.4)
RBC #/AREA URNS HPF: 0 /HPF (ref 0–4)
SODIUM SERPL-SCNC: 132 MMOL/L (ref 136–145)
SP GR UR STRIP: 1.02 (ref 1–1.03)
SQUAMOUS #/AREA URNS HPF: 2 /HPF
T4 FREE SERPL-MCNC: 0.64 NG/DL (ref 0.71–1.51)
TRIGL SERPL-MCNC: 1345 MG/DL (ref 30–150)
TSH SERPL DL<=0.005 MIU/L-ACNC: 6.58 UIU/ML (ref 0.34–5.6)
URN SPEC COLLECT METH UR: ABNORMAL
UROBILINOGEN UR STRIP-ACNC: NEGATIVE EU/DL
WBC # BLD AUTO: 8.37 K/UL (ref 3.9–12.7)
WBC #/AREA URNS HPF: 1 /HPF (ref 0–5)

## 2023-02-23 PROCEDURE — 80061 LIPID PANEL: CPT | Performed by: NURSE PRACTITIONER

## 2023-02-23 PROCEDURE — 82570 ASSAY OF URINE CREATININE: CPT | Performed by: NURSE PRACTITIONER

## 2023-02-23 PROCEDURE — 84439 ASSAY OF FREE THYROXINE: CPT | Performed by: NURSE PRACTITIONER

## 2023-02-23 PROCEDURE — 81001 URINALYSIS AUTO W/SCOPE: CPT | Performed by: NURSE PRACTITIONER

## 2023-02-23 PROCEDURE — 84443 ASSAY THYROID STIM HORMONE: CPT | Performed by: NURSE PRACTITIONER

## 2023-02-23 PROCEDURE — 80053 COMPREHEN METABOLIC PANEL: CPT | Performed by: NURSE PRACTITIONER

## 2023-02-23 PROCEDURE — 36415 COLL VENOUS BLD VENIPUNCTURE: CPT | Performed by: NURSE PRACTITIONER

## 2023-02-23 PROCEDURE — 83036 HEMOGLOBIN GLYCOSYLATED A1C: CPT | Performed by: NURSE PRACTITIONER

## 2023-02-23 PROCEDURE — 85025 COMPLETE CBC W/AUTO DIFF WBC: CPT | Performed by: NURSE PRACTITIONER

## 2023-02-28 DIAGNOSIS — E11.65 TYPE 2 DIABETES MELLITUS WITH HYPERGLYCEMIA, WITHOUT LONG-TERM CURRENT USE OF INSULIN: ICD-10-CM

## 2023-02-28 RX ORDER — LANCETS 26 GAUGE
1 EACH MISCELLANEOUS DAILY
Qty: 100 EACH | Refills: 0 | Status: SHIPPED | OUTPATIENT
Start: 2023-02-28 | End: 2023-08-07 | Stop reason: SDUPTHER

## 2023-02-28 NOTE — TELEPHONE ENCOUNTER
Spoke with pt, she will keep track of BG readings and call back in 3 weeks with some readings. She needs refills on lancets and test strips.

## 2023-02-28 NOTE — ADDENDUM NOTE
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  She is on day 11 of cycle 7.  She was noted to be pale in clinic but grandma states she has been active and playful and not taking extra naps. Appetite remains good; she eats a well balanced diet. She does not complain of abdominal pain, nausea or vomiting. She has regular BM; stools are semi formed.  She has had no increased bruising or bleeding.  She remains afebrile.           Current Treatment Plan:  Protocol: KRKC3246 UH-3  On study   Category: standard  Relapse FH Wilm's tumor  Cycle 7  Day 11     Oncology History  Diagnosed FH Wilms tumor stage II s/p left nephrectomy and treated per regimen  4A from 20 to 21.      Presented 21 with a 2 week history of intermittent fever, decreased energy, decreased appetite and RUQ pain . Routine imaging at 3 months off therapy revealed a new large heterogenous necrotic looking mass in the right lobe of the liver. Biopsy consistent with FH Wilms. Enrolled on AREN 1921 regimen  3.      Received proton therapy week 7 (21 to 21) : 19.84 Gy x 11 fxns    Past Medical History  Past Medical History:   Diagnosis Date   • Cancer (CMS/HCC)    • Wilms' tumor of left kidney with favorable histology (CMS/HCC) 2020    Stage II favorable histology   • Wilms' tumor, left (CMS/HCC)        Past Surgical History:   Procedure Laterality Date   • Central line insertion Right 2020   • Laparoscopic exploration abdomen  2020   •  Laparoscopy radical nephrectmy Left 08/27/2020       Immunizations    There is no immunization history on file for this patient.    Flu shot documented within the past year: no     Allergies  ALLERGIES:  No Known Allergies    Home Medications  Current Outpatient Medications   Medication Sig Dispense Refill   • acetaminophen (TYLENOL) 160 MG/5ML suspension Take 8.2 mLs by mouth every 6 hours as needed for Pain (Mild to Moderate Pain). Do not administer at same time of Hycet as both medications contain Tylenol.     • sulfamethoxazole-trimethoprim (BACTRIM) 200-40 MG/5ML suspension Take 5 ml by mouth twice daily every Saturday & Sunday 100 mL 3   • ondansetron ORAL (ZOFRAN) 4 MG/5ML oral solution Take 3.3 mL by mouth every 6 hours for 48 hours and then every 6 hours as needed     • lidocaine-prilocaine (EMLA) cream Apply to port site as directed 30 min-1 hr prior to port access. 30 g 11   • megestrol (MEGACE) 40 mg/mL suspension Take 2.5 mLs by mouth 2 times daily. As needed for appetite stimulant. 100 mL 2   • hydrOXYzine (ATARAX) 10 MG/5ML syrup Take 4 mLs by mouth every 6 hours as needed (nausea or vomiting). 1 Bottle 3   • polyethylene glycol (MiraLax) 17 GM/SCOOP powder Take 9 g by mouth daily as needed (constipation). Stir and dissolve powder in any 4 to 8 ounces of beverage, then drink. 255 g 3   • HYDROcodone-acetaminophen 7.5-325 MG/15ML solution Take 3.5 mLs by mouth every 6 hours as needed for Pain (Moderate to Severe Pain). 20 mL 0   • triamcinolone (ARISTOCORT) 0.1 % ointment Apply 1 application topically as needed (dry skin/itching). 30 g 0     Current Facility-Administered Medications   Medication Dose Route Frequency Provider Last Rate Last Admin   • heparin 100 UNIT/ML lock flush 500 Units  5 mL Intracatheter Once Alecia Agustin CNP           Family History  History reviewed. No pertinent family history.    Pediatric History   Patient Parents/Guardians   • ALEXYS PAYNE (Mother/Guardian)     Other  Topics Concern   • Not on file   Social History Narrative   • Not on file        Review of Systems    Constitutional: Negative for activity change, chills, fever and irritability.   HENT: Negative for congestion, ear discharge, ear pain, hearing loss, nosebleeds, rhinorrhea, sore throat and voice change.    Eyes: Negative for pain, discharge and visual disturbance.   Respiratory: Negative for chest tightness, shortness of breath and wheezing.    Cardiovascular: Negative for chest pain and palpitations.   Gastrointestinal: Negative for abdominal pain, blood in stool, constipation, diarrhea, nausea and vomiting.   Endocrine: Negative for cold intolerance, heat intolerance, polydipsia, polyphagia and polyuria.   Genitourinary: Negative for difficulty urinating, dysuria, frequency and hematuria.   Musculoskeletal: Negative for arthralgias, back pain, gait problem, joint swelling and myalgias.   Skin: Negative for color change and rash.   Allergic/Immunologic: Negative for environmental allergies and food allergies.   Neurological: Negative for dizziness, seizures, syncope, speech difficulty, light-headedness, numbness and headaches.   Hematological: Negative for adenopathy. Does not bruise/bleed easily.   Psychiatric/Behavioral: Negative for behavioral problems, confusion, hallucinations and sleep disturbance.           Vitals  Visit Vitals  BP (!) 118/52 (BP Location: LLE - Left lower extremity, Patient Position: Sitting, Cuff Size: Pediatric)   Pulse 131   Temp 98.2 °F (36.8 °C) (Oral)   Resp 24   Ht 103.2 cm   Wt 18.4 kg   SpO2 100%   BMI 17.28 kg/m²     Body surface area is 0.71 meters squared.  Body mass index is 17.28 kg/m².    Physical Exam  [place]ky: 100: fully active, normal   Constitutional: Appears well-developed and well-nourished, active in no distress.   HENT:   Right Ear: Tympanic membrane normal.   Left Ear: Tympanic membrane normal.   Nose: Nose normal. No nasal discharge.   Mouth/Throat: Mucous membranes  are moist. Dentition is normal. No dental caries. No tonsillar exudate. Oropharynx is clear.   Eyes: Conjunctivae and EOM are normal. Pupils are equal, round, and reactive to light. Right eye exhibits no discharge. Left eye exhibits no discharge. Pale palpebral conjunctiva; anicteric sclerae   Neck: Neck supple. No neck rigidity.   Cardiovascular: Normal rate, regular rhythm, S1 normal and S2 normal. Pulses are palpable.   No murmur heard.  Pulmonary/Chest: Effort normal and breath sounds normal. There is normal air entry; no wheezes, no rales and no retraction.   Abdominal: Soft. Bowel sounds are normal. No distension and no mass. There is no hepatosplenomegaly. There is no tenderness. There is no rebound and no guarding.     Musculoskeletal: Normal range of motion. There is no edema, tenderness or deformity.   Lymphadenopathy:     There is no cervical, axillary or inguinal adenopathy.   Neurological: Patient is alert. No cranial nerve deficit; no motor or sensory deficit. Coordination normal.  Reflexes are normal.   Skin: Skin is warm and moist. Capillary refill takes less than 2 seconds. No petechiae, no purpura and no rash noted. No jaundice.        Labs     Component      Latest Ref Rng & Units 10/14/2021          10:34 AM   WBC      6.0 - 17.0 K/mcL 0.7 (LL)   RBC      3.90 - 5.30 mil/mcL 2.29 (L)   HGB      11.5 - 13.5 g/dL 6.6 (LL)   HCT      34.0 - 40.0 % 18.7 (L)   MCV      70.0 - 86.0 fl 81.7   MCH      24.0 - 30.0 pg 28.8   MCHC      30.0 - 36.0 g/dL 35.3   RDW-CV      11.0 - 15.0 % 13.7   PLT      140 - 450 K/mcL 25 (LL)   Analyzer ANC      1.5 - 8.5 K/mcl 0.4 (LL)   NRBC      <=0 /100 WBC 3 (H)   DIFFERENTIAL TYPE          SEG      %    Neutrophil      % 53   LYMPH      % 18   MONO      % 23   EOSIN      % 4   BASO      % 1   Percent Immature Granuloctyes      %    Absolute Neutrophil      1.5 - 8.5 K/mcL 0.4 (LL)   Absolute Lymph      2.0 - 8.0 K/mcL 0.1 (L)   Absolute Mono      0.0 - 0.8 K/mcL 0.2    Absolute Eos      0.0 - 0.7 K/mcL 0.0   Absolute Baso      0.0 - 0.2 K/mcL 0.0   Absolute Immature Granulocytes      0.0 - 0.2 K/mcL 0.0   LYMPH      %    REACTIVE LYMPH      0 - 5 %    MONO      %    EOSIN      %    BASO      %    Absolute Neut      1.5 - 8.5 K/mcL    Absolute Lymph      2.0 - 8.0 K/mcL    Absolute Mono      0.0 - 0.8 K/mcL    Absolute Eos      0.1 - 0.7 K/mcL    Absolute Baso      0.0 - 0.2 K/mcL    WBC MORPHOLOGY      NORMAL    PLATELETS APPEAR      NORMAL    Microcytosis          Shistocytes          Immature Granulocytes      % 1   RDW-SD      35.0 - 47.0 fL 40.6       Assessment  Gabrielle Wisdom is a 4 year old female with standard relapse favorable histology Wilm's tumor into the liver with good response to chemotherapy currently on day 11 of cycle 7  Pancytopenia sec to chemotherapy; consistent with the shivani   Transfuse 15 ml/kg or 275 ml irradiated PRBC over 3 hr  Advised to call for increased bruising or bleeding  Call for fever 101 F and above; risk of neutropenic fever  Continue megace  Immunosuppression: bactrim on weekends  She has a scheduled CT abdomen with arterial and venous contrast at [facility] next week and surgery is scheduled last week of Oct 2021    RTC Mon 10/18 MD visit, CBC, CMP, VCR  Grandma verbalized understanding                Joe Rincon MD

## 2023-05-09 ENCOUNTER — OFFICE VISIT (OUTPATIENT)
Dept: FAMILY MEDICINE | Facility: CLINIC | Age: 48
End: 2023-05-09
Payer: MEDICAID

## 2023-05-09 VITALS
WEIGHT: 197 LBS | BODY MASS INDEX: 33.63 KG/M2 | HEART RATE: 82 BPM | HEIGHT: 64 IN | SYSTOLIC BLOOD PRESSURE: 134 MMHG | TEMPERATURE: 98 F | DIASTOLIC BLOOD PRESSURE: 90 MMHG | OXYGEN SATURATION: 98 %

## 2023-05-09 DIAGNOSIS — I10 BENIGN HYPERTENSION: ICD-10-CM

## 2023-05-09 DIAGNOSIS — F41.9 ANXIETY: ICD-10-CM

## 2023-05-09 DIAGNOSIS — E11.65 TYPE 2 DIABETES MELLITUS WITH HYPERGLYCEMIA, WITHOUT LONG-TERM CURRENT USE OF INSULIN: ICD-10-CM

## 2023-05-09 DIAGNOSIS — Z12.31 SCREENING MAMMOGRAM FOR BREAST CANCER: ICD-10-CM

## 2023-05-09 DIAGNOSIS — E03.9 HYPOTHYROIDISM, UNSPECIFIED TYPE: ICD-10-CM

## 2023-05-09 DIAGNOSIS — E03.8 SUBCLINICAL HYPOTHYROIDISM: ICD-10-CM

## 2023-05-09 DIAGNOSIS — E78.2 MIXED HYPERLIPIDEMIA: Primary | ICD-10-CM

## 2023-05-09 LAB — HBA1C MFR BLD: 8 %

## 2023-05-09 PROCEDURE — 3080F PR MOST RECENT DIASTOLIC BLOOD PRESSURE >= 90 MM HG: ICD-10-PCS | Mod: CPTII,S$GLB,, | Performed by: NURSE PRACTITIONER

## 2023-05-09 PROCEDURE — 3046F HEMOGLOBIN A1C LEVEL >9.0%: CPT | Mod: CPTII,S$GLB,, | Performed by: NURSE PRACTITIONER

## 2023-05-09 PROCEDURE — 3066F PR DOCUMENTATION OF TREATMENT FOR NEPHROPATHY: ICD-10-PCS | Mod: CPTII,S$GLB,, | Performed by: NURSE PRACTITIONER

## 2023-05-09 PROCEDURE — 3075F PR MOST RECENT SYSTOLIC BLOOD PRESS GE 130-139MM HG: ICD-10-PCS | Mod: CPTII,S$GLB,, | Performed by: NURSE PRACTITIONER

## 2023-05-09 PROCEDURE — 1160F RVW MEDS BY RX/DR IN RCRD: CPT | Mod: CPTII,S$GLB,, | Performed by: NURSE PRACTITIONER

## 2023-05-09 PROCEDURE — 3008F PR BODY MASS INDEX (BMI) DOCUMENTED: ICD-10-PCS | Mod: CPTII,S$GLB,, | Performed by: NURSE PRACTITIONER

## 2023-05-09 PROCEDURE — 83036 HEMOGLOBIN GLYCOSYLATED A1C: CPT | Mod: QW,,, | Performed by: NURSE PRACTITIONER

## 2023-05-09 PROCEDURE — 3008F BODY MASS INDEX DOCD: CPT | Mod: CPTII,S$GLB,, | Performed by: NURSE PRACTITIONER

## 2023-05-09 PROCEDURE — 4010F PR ACE/ARB THEARPY RXD/TAKEN: ICD-10-PCS | Mod: CPTII,S$GLB,, | Performed by: NURSE PRACTITIONER

## 2023-05-09 PROCEDURE — 99214 PR OFFICE/OUTPT VISIT, EST, LEVL IV, 30-39 MIN: ICD-10-PCS | Mod: S$GLB,,, | Performed by: NURSE PRACTITIONER

## 2023-05-09 PROCEDURE — 99214 OFFICE O/P EST MOD 30 MIN: CPT | Mod: S$GLB,,, | Performed by: NURSE PRACTITIONER

## 2023-05-09 PROCEDURE — 1159F MED LIST DOCD IN RCRD: CPT | Mod: CPTII,S$GLB,, | Performed by: NURSE PRACTITIONER

## 2023-05-09 PROCEDURE — 3046F PR MOST RECENT HEMOGLOBIN A1C LEVEL > 9.0%: ICD-10-PCS | Mod: CPTII,S$GLB,, | Performed by: NURSE PRACTITIONER

## 2023-05-09 PROCEDURE — 3062F PR POS MACROALBUMINURIA RESULT DOCUMENTED/REVIEW: ICD-10-PCS | Mod: CPTII,S$GLB,, | Performed by: NURSE PRACTITIONER

## 2023-05-09 PROCEDURE — 3080F DIAST BP >= 90 MM HG: CPT | Mod: CPTII,S$GLB,, | Performed by: NURSE PRACTITIONER

## 2023-05-09 PROCEDURE — 3066F NEPHROPATHY DOC TX: CPT | Mod: CPTII,S$GLB,, | Performed by: NURSE PRACTITIONER

## 2023-05-09 PROCEDURE — 3062F POS MACROALBUMINURIA REV: CPT | Mod: CPTII,S$GLB,, | Performed by: NURSE PRACTITIONER

## 2023-05-09 PROCEDURE — 1160F PR REVIEW ALL MEDS BY PRESCRIBER/CLIN PHARMACIST DOCUMENTED: ICD-10-PCS | Mod: CPTII,S$GLB,, | Performed by: NURSE PRACTITIONER

## 2023-05-09 PROCEDURE — 1159F PR MEDICATION LIST DOCUMENTED IN MEDICAL RECORD: ICD-10-PCS | Mod: CPTII,S$GLB,, | Performed by: NURSE PRACTITIONER

## 2023-05-09 PROCEDURE — 3075F SYST BP GE 130 - 139MM HG: CPT | Mod: CPTII,S$GLB,, | Performed by: NURSE PRACTITIONER

## 2023-05-09 PROCEDURE — 83036 POCT HEMOGLOBIN A1C: ICD-10-PCS | Mod: QW,,, | Performed by: NURSE PRACTITIONER

## 2023-05-09 PROCEDURE — 4010F ACE/ARB THERAPY RXD/TAKEN: CPT | Mod: CPTII,S$GLB,, | Performed by: NURSE PRACTITIONER

## 2023-05-09 RX ORDER — ESCITALOPRAM OXALATE 10 MG/1
10 TABLET ORAL DAILY
Qty: 90 TABLET | Refills: 0 | Status: SHIPPED | OUTPATIENT
Start: 2023-05-09 | End: 2023-07-19 | Stop reason: SDUPTHER

## 2023-05-09 RX ORDER — ATORVASTATIN CALCIUM 20 MG/1
20 TABLET, FILM COATED ORAL DAILY
Qty: 90 TABLET | Refills: 0 | Status: SHIPPED | OUTPATIENT
Start: 2023-05-09 | End: 2023-07-19 | Stop reason: SDUPTHER

## 2023-05-09 RX ORDER — DULAGLUTIDE 1.5 MG/.5ML
1.5 INJECTION, SOLUTION SUBCUTANEOUS
Qty: 4 PEN | Refills: 2 | Status: SHIPPED | OUTPATIENT
Start: 2023-05-09 | End: 2023-07-19

## 2023-05-09 RX ORDER — LEVOTHYROXINE SODIUM 100 UG/1
100 TABLET ORAL DAILY
Qty: 90 TABLET | Refills: 0 | Status: SHIPPED | OUTPATIENT
Start: 2023-05-09 | End: 2023-09-14

## 2023-05-09 RX ORDER — LISINOPRIL 20 MG/1
20 TABLET ORAL DAILY
Qty: 90 TABLET | Refills: 0 | Status: SHIPPED | OUTPATIENT
Start: 2023-05-09 | End: 2023-07-19 | Stop reason: SDUPTHER

## 2023-05-09 NOTE — PROGRESS NOTES
SUBJECTIVE:      Patient ID: Tsering Carlos is a 48 y.o. female.    Chief Complaint: Hypertension    Patient is here today to f/u on DM, hypothyroidism, HTN and hyperlipidemia. She was off all medication for 3 months, restarted at her last ov. She is tolerating her medication well. Due for a mammogram. Utd with her eye exam    Diabetes  She presents for her follow-up diabetic visit. She has type 2 diabetes mellitus. Hypoglycemia symptoms include nervousness/anxiousness (stable ). Pertinent negatives for hypoglycemia include no confusion, dizziness, headaches, pallor, speech difficulty or sweats. Pertinent negatives for diabetes include no blurred vision, no chest pain, no fatigue, no foot paresthesias, no foot ulcerations, no polydipsia, no polyphagia, no polyuria, no visual change, no weakness and no weight loss. There are no hypoglycemic complications. There are no diabetic complications. Risk factors for coronary artery disease include hypertension, obesity, sedentary lifestyle, dyslipidemia, family history and diabetes mellitus. Current diabetic treatment includes oral agent (monotherapy) (Trulicity ). She is following a generally healthy diet. When asked about meal planning, she reported none. She rarely participates in exercise. Home blood sugar record trend: not checking  An ACE inhibitor/angiotensin II receptor blocker is being taken. She does not see a podiatrist.Eye exam is not current.   Hypertension  This is a chronic problem. The current episode started more than 1 year ago. The problem is controlled. Associated symptoms include anxiety. Pertinent negatives include no blurred vision, chest pain, headaches, malaise/fatigue, neck pain, palpitations, peripheral edema, shortness of breath or sweats. There are no associated agents to hypertension. Risk factors for coronary artery disease include obesity, sedentary lifestyle, dyslipidemia, diabetes mellitus and family history. Past treatments include  ACE inhibitors. The current treatment provides moderate improvement. Compliance problems include exercise and diet.  Identifiable causes of hypertension include a thyroid problem. There is no history of chronic renal disease.   Hyperlipidemia  This is a chronic problem. The current episode started more than 1 year ago. Recent lipid tests were reviewed and are high. Exacerbating diseases include diabetes, hypothyroidism and obesity. She has no history of chronic renal disease or liver disease. There are no known factors aggravating her hyperlipidemia. Pertinent negatives include no chest pain, leg pain, myalgias or shortness of breath. Current antihyperlipidemic treatment includes statins. Risk factors for coronary artery disease include a sedentary lifestyle, obesity, hypertension, diabetes mellitus, dyslipidemia and family history.   Anxiety  Presents for follow-up visit. Symptoms include excessive worry and nervous/anxious behavior (stable ). Patient reports no chest pain, confusion, dizziness, palpitations, shortness of breath or suicidal ideas. Symptoms occur occasionally. The severity of symptoms is mild. The quality of sleep is good.     Compliance with medications is %.     Past Surgical History:   Procedure Laterality Date    ADENOIDECTOMY  1985    HERNIA REPAIR  03/21/2016    INSERTION OF CONTRACEPTIVE CAPSULE  2012    TONSILLECTOMY  1985    TYMPANOSTOMY TUBE PLACEMENT  1985     Family History   Problem Relation Age of Onset    Hypertension Mother     Stroke Mother     Heart disease Mother     Diabetes Mother     Diabetes Father     Stroke Father     Heart disease Father     Depression Father       Social History     Socioeconomic History    Marital status:    Occupational History    Occupation: business owner   Tobacco Use    Smoking status: Never     Passive exposure: Never    Smokeless tobacco: Never   Substance and Sexual Activity    Alcohol use: No    Drug use: No     Current Outpatient  Medications   Medication Sig Dispense Refill    metFORMIN (GLUCOPHAGE) 500 MG tablet Take 1 tablet (500 mg total) by mouth 2 (two) times daily with meals. 180 tablet 0    nitroGLYCERIN (NITROSTAT) 0.4 MG SL tablet Place 0.4 mg under the tongue every 5 (five) minutes as needed for Chest pain.      atorvastatin (LIPITOR) 20 MG tablet Take 1 tablet (20 mg total) by mouth once daily. 90 tablet 0    blood sugar diagnostic Strp Use 1 strip daily to check blood sugar 100 strip 1    dulaglutide (TRULICITY) 1.5 mg/0.5 mL pen injector Inject 1.5 mg into the skin every 7 days. 4 pen 2    EScitalopram oxalate (LEXAPRO) 10 MG tablet Take 1 tablet (10 mg total) by mouth once daily. 90 tablet 0    lancing device with lancets Kit 1 Units by Misc.(Non-Drug; Combo Route) route once daily. 100 each 0    levothyroxine (SYNTHROID) 100 MCG tablet Take 1 tablet (100 mcg total) by mouth once daily. 90 tablet 0    lisinopriL (PRINIVIL,ZESTRIL) 20 MG tablet Take 1 tablet (20 mg total) by mouth once daily. 90 tablet 0     No current facility-administered medications for this visit.     Review of patient's allergies indicates:  No Known Allergies   Past Medical History:   Diagnosis Date    Anxiety     Breast cyst     4 mm cyst according to mammo, repeat test 5/2016    Diabetes mellitus, type 2     High calcium levels     Hypertension     Hypertriglyceridemia     Hypothyroidism     Umbilical hernia     since 2009, worse after giving birth     Past Surgical History:   Procedure Laterality Date    ADENOIDECTOMY  1985    HERNIA REPAIR  03/21/2016    INSERTION OF CONTRACEPTIVE CAPSULE  2012    TONSILLECTOMY  1985    TYMPANOSTOMY TUBE PLACEMENT  1985       Review of Systems   Constitutional:  Negative for appetite change, chills, diaphoresis, fatigue, malaise/fatigue, unexpected weight change and weight loss.   HENT:  Negative for ear discharge, hearing loss, trouble swallowing and voice change.    Eyes:  Negative for blurred vision, photophobia and  "pain.   Respiratory:  Negative for chest tightness, shortness of breath and stridor.    Cardiovascular:  Negative for chest pain, palpitations and leg swelling.   Gastrointestinal:  Negative for abdominal pain, blood in stool and vomiting.   Endocrine: Negative for cold intolerance, heat intolerance, polydipsia, polyphagia and polyuria.   Genitourinary:  Negative for difficulty urinating and flank pain.   Musculoskeletal:  Negative for joint swelling, myalgias, neck pain and neck stiffness.   Skin:  Negative for pallor.   Neurological:  Negative for dizziness, speech difficulty, weakness and headaches.   Hematological:  Does not bruise/bleed easily.   Psychiatric/Behavioral:  Negative for confusion, dysphoric mood, self-injury, sleep disturbance and suicidal ideas. The patient is nervous/anxious (stable ).     OBJECTIVE:      Vitals:    05/09/23 0750   BP: (!) 140/90   Pulse: 82   Temp: 98.2 °F (36.8 °C)   SpO2: 98%   Weight: 89.4 kg (197 lb)   Height: 5' 4" (1.626 m)     Physical Exam  Vitals and nursing note reviewed.   Constitutional:       General: She is not in acute distress.     Appearance: She is well-developed.   HENT:      Head: Normocephalic and atraumatic.      Right Ear: Tympanic membrane normal.      Left Ear: Tympanic membrane normal.      Nose: Nose normal.      Mouth/Throat:      Pharynx: Uvula midline.   Eyes:      General: Lids are normal.      Conjunctiva/sclera: Conjunctivae normal.      Pupils: Pupils are equal, round, and reactive to light.      Right eye: Pupil is round and reactive.      Left eye: Pupil is round and reactive.   Neck:      Thyroid: No thyromegaly.      Vascular: No JVD.      Trachea: Trachea normal.   Cardiovascular:      Rate and Rhythm: Normal rate and regular rhythm.      Pulses: Normal pulses.      Heart sounds: Normal heart sounds. No murmur heard.  Pulmonary:      Effort: Pulmonary effort is normal. No tachypnea or respiratory distress.      Breath sounds: Normal breath " sounds. No wheezing, rhonchi or rales.   Abdominal:      General: Bowel sounds are normal.      Palpations: Abdomen is soft.      Tenderness: There is no abdominal tenderness.   Musculoskeletal:         General: Normal range of motion.      Cervical back: Normal range of motion and neck supple.      Right lower leg: No edema.      Left lower leg: No edema.      Right foot: No deformity.      Left foot: No deformity.   Feet:      Right foot:      Protective Sensation: 10 sites tested.  10 sites sensed.      Skin integrity: No ulcer, blister or skin breakdown.      Left foot:      Protective Sensation: 10 sites tested.  10 sites sensed.      Skin integrity: No ulcer, blister or skin breakdown.   Lymphadenopathy:      Cervical: No cervical adenopathy.   Skin:     General: Skin is warm and dry.      Findings: No rash.   Neurological:      Mental Status: She is alert and oriented to person, place, and time.   Psychiatric:         Mood and Affect: Mood normal.         Speech: Speech normal.         Behavior: Behavior normal. Behavior is cooperative.         Thought Content: Thought content normal.         Judgment: Judgment normal.      No visits with results within 1 Month(s) from this visit.   Latest known visit with results is:   Lab Visit on 02/23/2023   Component Date Value Ref Range Status    WBC 02/23/2023 8.37  3.90 - 12.70 K/uL Final    RBC 02/23/2023 4.47  4.00 - 5.40 M/uL Final    Hemoglobin 02/23/2023 13.3  12.0 - 16.0 g/dL Final    Hematocrit 02/23/2023 39.8  37.0 - 48.5 % Final    MCV 02/23/2023 89  82 - 98 fL Final    MCH 02/23/2023 29.8  27.0 - 31.0 pg Final    MCHC 02/23/2023 33.4  32.0 - 36.0 g/dL Final    RDW 02/23/2023 13.1  11.5 - 14.5 % Final    Platelets 02/23/2023 271  150 - 450 K/uL Final    MPV 02/23/2023 9.0 (L)  9.2 - 12.9 fL Final    Immature Granulocytes 02/23/2023 0.2  0.0 - 0.5 % Final    Gran # (ANC) 02/23/2023 4.4  1.8 - 7.7 K/uL Final    Immature Grans (Abs) 02/23/2023 0.02  0.00 - 0.04  K/uL Final    Comment: Mild elevation in immature granulocytes is non specific and   can be seen in a variety of conditions including stress response,   acute inflammation, trauma and pregnancy. Correlation with other   laboratory and clinical findings is essential.      Lymph # 02/23/2023 3.0  1.0 - 4.8 K/uL Final    Mono # 02/23/2023 0.6  0.3 - 1.0 K/uL Final    Eos # 02/23/2023 0.3  0.0 - 0.5 K/uL Final    Baso # 02/23/2023 0.06  0.00 - 0.20 K/uL Final    nRBC 02/23/2023 0  0 /100 WBC Final    Gran % 02/23/2023 52.5  38.0 - 73.0 % Final    Lymph % 02/23/2023 36.0  18.0 - 48.0 % Final    Mono % 02/23/2023 6.7  4.0 - 15.0 % Final    Eosinophil % 02/23/2023 3.9  0.0 - 8.0 % Final    Basophil % 02/23/2023 0.7  0.0 - 1.9 % Final    Differential Method 02/23/2023 Automated   Final    Sodium 02/23/2023 132 (L)  136 - 145 mmol/L Final    Potassium 02/23/2023 3.9  3.5 - 5.1 mmol/L Final    Chloride 02/23/2023 95  95 - 110 mmol/L Final    CO2 02/23/2023 23  23 - 29 mmol/L Final    Glucose 02/23/2023 231 (H)  70 - 110 mg/dL Final    BUN 02/23/2023 10  6 - 20 mg/dL Final    Creatinine 02/23/2023 0.5  0.5 - 1.4 mg/dL Final    Calcium 02/23/2023 8.9  8.7 - 10.5 mg/dL Final    Total Protein 02/23/2023 7.4  6.0 - 8.4 g/dL Final    Albumin 02/23/2023 3.8  3.5 - 5.2 g/dL Final    Total Bilirubin 02/23/2023 0.7  0.1 - 1.0 mg/dL Final    Comment: For infants and newborns, interpretation of results should be based  on gestational age, weight and in agreement with clinical  observations.    Premature Infant recommended reference ranges:  Up to 24 hours.............<8.0 mg/dL  Up to 48 hours............<12.0 mg/dL  3-5 days..................<15.0 mg/dL  6-29 days.................<15.0 mg/dL      Alkaline Phosphatase 02/23/2023 50 (L)  55 - 135 U/L Final    AST 02/23/2023 43 (H)  10 - 40 U/L Final    ALT 02/23/2023 26  10 - 44 U/L Final    Anion Gap 02/23/2023 14  8 - 16 mmol/L Final    eGFR 02/23/2023 >60.0  >60 mL/min/1.73 m^2 Final     Cholesterol 02/23/2023 351 (H)  120 - 199 mg/dL Final    Comment: The National Cholesterol Education Program (NCEP) has set the  following guidelines (reference ranges) for Cholesterol:  Optimal.....................<200 mg/dL  Borderline High.............200-239 mg/dL  High........................> or = 240 mg/dL      Triglycerides 02/23/2023 1,345 (H)  30 - 150 mg/dL Final    Comment: The National Cholesterol Education Program (NCEP) has set the  following guidelines (reference values) for triglycerides:  Normal......................<150 mg/dL  Borderline High.............150-199 mg/dL  High........................200-499 mg/dL  Results obtained by dilution.      HDL 02/23/2023 38 (L)  40 - 75 mg/dL Final    Comment: The National Cholesterol Education Program (NCEP) has set the  following guidelines (reference values) for HDL Cholesterol:  Low...............<40 mg/dL  Optimal...........>60 mg/dL      LDL Cholesterol 02/23/2023 Invalid, Trig>400.0  63.0 - 159.0 mg/dL Final    Comment: The National Cholesterol Education Program (NCEP) has set the  following guidelines (reference values) for LDL Cholesterol:  Optimal.......................<130 mg/dL  Borderline High...............130-159 mg/dL  High..........................160-189 mg/dL  Very High.....................>190 mg/dL      HDL/Cholesterol Ratio 02/23/2023 10.8 (L)  20.0 - 50.0 % Final    Total Cholesterol/HDL Ratio 02/23/2023 9.2 (H)  2.0 - 5.0 Final    Non-HDL Cholesterol 02/23/2023 313  mg/dL Final    Comment: Risk category and Non-HDL cholesterol goals:  Coronary heart disease (CHD)or equivalent (10-year risk of CHD >20%):  Non-HDL cholesterol goal     <130 mg/dL  Two or more CHD risk factors and 10-year risk of CHD <= 20%:  Non-HDL cholesterol goal     <160 mg/dL  0 to 1 CHD risk factor:  Non-HDL cholesterol goal     <190 mg/dL      TSH 02/23/2023 6.580 (H)  0.340 - 5.600 uIU/mL Final    Specimen UA 02/23/2023 Urine, Clean Catch   Final    Color, UA  02/23/2023 Yellow  Yellow, Straw, Mayi Final    Appearance, UA 02/23/2023 Clear  Clear Final    pH, UA 02/23/2023 6.0  5.0 - 8.0 Final    Specific Gravity, UA 02/23/2023 1.025  1.005 - 1.030 Final    Protein, UA 02/23/2023 2+ (A)  Negative Final    Comment: Recommend a 24 hour urine protein or a urine   protein/creatinine ratio if globulin induced proteinuria is  clinically suspected.      Glucose, UA 02/23/2023 2+ (A)  Negative Final    Ketones, UA 02/23/2023 1+ (A)  Negative Final    Bilirubin (UA) 02/23/2023 Negative  Negative Final    Occult Blood UA 02/23/2023 Negative  Negative Final    Nitrite, UA 02/23/2023 Negative  Negative Final    Urobilinogen, UA 02/23/2023 Negative  Negative EU/dL Final    Leukocytes, UA 02/23/2023 Negative  Negative Final    Hemoglobin A1C 02/23/2023 9.5 (H)  4.5 - 6.2 % Final    Comment: According to ADA guidelines, hemoglobin A1C <7.0% represents  optimal control in non-pregnant diabetic patients.  Different  metrics may apply to specific populations.   Standards of Medical Care in Diabetes - 2016.    For the purpose of screening for the presence of diabetes:  <5.7%     Consistent with the absence of diabetes  5.7-6.4%  Consistent with increasing risk for diabetes   (prediabetes)  >or=6.5%  Consistent with diabetes    Currently no consensus exists for use of hemoglobin A1C  for diagnosis of diabetes for children.      Estimated Avg Glucose 02/23/2023 226 (H)  68 - 131 mg/dL Final    Microalbumin, Urine 02/23/2023 1114.0 (H)  <19.9 ug/mL Final    Creatinine, Urine 02/23/2023 168.0  15.0 - 325.0 mg/dL Final    Comment: The random urine reference ranges provided were established   for 24 hour urine collections.  No reference ranges exist for  random urine specimens.  Correlate clinically.      Microalb/Creat Ratio 02/23/2023 663.1 (H)  0.0 - 30.0 ug/mg Final    RBC, UA 02/23/2023 0  0 - 4 /hpf Final    WBC, UA 02/23/2023 1  0 - 5 /hpf Final    Bacteria 02/23/2023 Negative  None-Occ  /hpf Final    Squam Epithel, UA 02/23/2023 2  /hpf Final    Hyaline Casts, UA 02/23/2023 8 (A)  0-1/lpf /lpf Final    Microscopic Comment 02/23/2023 SEE COMMENT   Final    Comment: Other formed elements not mentioned in the report are not   present in the microscopic examination.       Free T4 02/23/2023 0.64 (L)  0.71 - 1.51 ng/dL Final   ]  Assessment:       1. Mixed hyperlipidemia    2. Benign hypertension    3. Hypothyroidism, unspecified type    4. Type 2 diabetes mellitus with hyperglycemia, without long-term current use of insulin    5. Screening mammogram for breast cancer    6. Anxiety    7. Subclinical hypothyroidism        Plan:       Mixed hyperlipidemia  -     Comprehensive Metabolic Panel; Future; Expected date: 05/23/2023  -     Lipid Panel; Future; Expected date: 05/23/2023  -     TSH; Future; Expected date: 06/20/2023  -     atorvastatin (LIPITOR) 20 MG tablet; Take 1 tablet (20 mg total) by mouth once daily.  Dispense: 90 tablet; Refill: 0    Benign hypertension  -     Comprehensive Metabolic Panel; Future; Expected date: 05/23/2023  -     Lipid Panel; Future; Expected date: 05/23/2023  -     TSH; Future; Expected date: 06/20/2023  -   increase  lisinopriL (PRINIVIL,ZESTRIL) 20 MG tablet; Take 1 tablet (20 mg total) by mouth once daily.  Dispense: 90 tablet; Refill: 0    Hypothyroidism, unspecified type  -     TSH; Future; Expected date: 06/20/2023  -     T4, Free; Future; Expected date: 05/09/2023    Type 2 diabetes mellitus with hyperglycemia, without long-term current use of insulin  -     Foot Exam Performed  -     Comprehensive Metabolic Panel; Future; Expected date: 05/23/2023  -     Hemoglobin A1C; Future; Expected date: 05/23/2023  -    increase dulaglutide (TRULICITY) 1.5 mg/0.5 mL pen injector; Inject 1.5 mg into the skin every 7 days.  Dispense: 4 pen; Refill: 2    Screening mammogram for breast cancer  -     Mammo Digital Screening Bilat w/ Reginaldo; Future; Expected date:  05/09/2023    Anxiety  -     EScitalopram oxalate (LEXAPRO) 10 MG tablet; Take 1 tablet (10 mg total) by mouth once daily.  Dispense: 90 tablet; Refill: 0    Subclinical hypothyroidism  -     levothyroxine (SYNTHROID) 100 MCG tablet; Take 1 tablet (100 mcg total) by mouth once daily.  Dispense: 90 tablet; Refill: 0        Follow up in about 2 months (around 7/9/2023) for DM.      5/9/2023 JOSE Cervantes, FNP

## 2023-06-26 ENCOUNTER — PATIENT MESSAGE (OUTPATIENT)
Dept: FAMILY MEDICINE | Facility: CLINIC | Age: 48
End: 2023-06-26

## 2023-06-26 ENCOUNTER — HOSPITAL ENCOUNTER (OUTPATIENT)
Dept: RADIOLOGY | Facility: HOSPITAL | Age: 48
Discharge: HOME | End: 2023-06-26
Attending: NURSE PRACTITIONER
Payer: MEDICAID

## 2023-06-26 VITALS — HEIGHT: 64 IN | BODY MASS INDEX: 33.64 KG/M2 | WEIGHT: 197.06 LBS

## 2023-06-26 DIAGNOSIS — Z12.31 SCREENING MAMMOGRAM FOR BREAST CANCER: ICD-10-CM

## 2023-06-26 PROCEDURE — 77067 SCR MAMMO BI INCL CAD: CPT | Mod: TC,PO

## 2023-07-01 DIAGNOSIS — E11.9 DIABETES MELLITUS WITHOUT COMPLICATION: ICD-10-CM

## 2023-07-03 RX ORDER — METFORMIN HYDROCHLORIDE 500 MG/1
TABLET ORAL
Qty: 180 TABLET | Refills: 0 | Status: SHIPPED | OUTPATIENT
Start: 2023-07-03 | End: 2023-07-19 | Stop reason: SDUPTHER

## 2023-07-19 ENCOUNTER — LAB VISIT (OUTPATIENT)
Dept: LAB | Facility: HOSPITAL | Age: 48
End: 2023-07-19
Attending: NURSE PRACTITIONER
Payer: MEDICAID

## 2023-07-19 ENCOUNTER — PATIENT MESSAGE (OUTPATIENT)
Dept: FAMILY MEDICINE | Facility: CLINIC | Age: 48
End: 2023-07-19

## 2023-07-19 ENCOUNTER — OFFICE VISIT (OUTPATIENT)
Dept: FAMILY MEDICINE | Facility: CLINIC | Age: 48
End: 2023-07-19
Payer: MEDICAID

## 2023-07-19 VITALS
WEIGHT: 196 LBS | BODY MASS INDEX: 33.46 KG/M2 | TEMPERATURE: 98 F | DIASTOLIC BLOOD PRESSURE: 80 MMHG | OXYGEN SATURATION: 98 % | HEART RATE: 99 BPM | SYSTOLIC BLOOD PRESSURE: 120 MMHG | HEIGHT: 64 IN

## 2023-07-19 DIAGNOSIS — E78.2 MIXED HYPERLIPIDEMIA: Primary | ICD-10-CM

## 2023-07-19 DIAGNOSIS — I10 BENIGN HYPERTENSION: ICD-10-CM

## 2023-07-19 DIAGNOSIS — E78.2 MIXED HYPERLIPIDEMIA: ICD-10-CM

## 2023-07-19 DIAGNOSIS — E03.9 HYPOTHYROIDISM, UNSPECIFIED TYPE: ICD-10-CM

## 2023-07-19 DIAGNOSIS — E11.65 TYPE 2 DIABETES MELLITUS WITH HYPERGLYCEMIA, WITHOUT LONG-TERM CURRENT USE OF INSULIN: ICD-10-CM

## 2023-07-19 DIAGNOSIS — E11.9 DIABETES MELLITUS WITHOUT COMPLICATION: ICD-10-CM

## 2023-07-19 DIAGNOSIS — E78.1 HYPERTRIGLYCERIDEMIA: ICD-10-CM

## 2023-07-19 DIAGNOSIS — F41.9 ANXIETY: ICD-10-CM

## 2023-07-19 LAB
ALBUMIN SERPL BCP-MCNC: 4 G/DL (ref 3.5–5.2)
ALP SERPL-CCNC: 44 U/L (ref 55–135)
ALT SERPL W/O P-5'-P-CCNC: 27 U/L (ref 10–44)
ANION GAP SERPL CALC-SCNC: 11 MMOL/L (ref 8–16)
AST SERPL-CCNC: 37 U/L (ref 10–40)
BILIRUB SERPL-MCNC: 0.8 MG/DL (ref 0.1–1)
BUN SERPL-MCNC: 11 MG/DL (ref 6–20)
CALCIUM SERPL-MCNC: 8.5 MG/DL (ref 8.7–10.5)
CHLORIDE SERPL-SCNC: 100 MMOL/L (ref 95–110)
CHOLEST SERPL-MCNC: 186 MG/DL (ref 120–199)
CHOLEST/HDLC SERPL: 5.6 {RATIO} (ref 2–5)
CO2 SERPL-SCNC: 26 MMOL/L (ref 23–29)
CREAT SERPL-MCNC: 0.6 MG/DL (ref 0.5–1.4)
EST. GFR  (NO RACE VARIABLE): >60 ML/MIN/1.73 M^2
ESTIMATED AVG GLUCOSE: 197 MG/DL (ref 68–131)
GLUCOSE SERPL-MCNC: 169 MG/DL (ref 70–110)
HBA1C MFR BLD: 8.5 % (ref 4.5–6.2)
HDLC SERPL-MCNC: 33 MG/DL (ref 40–75)
HDLC SERPL: 17.7 % (ref 20–50)
LDLC SERPL CALC-MCNC: ABNORMAL MG/DL (ref 63–159)
NONHDLC SERPL-MCNC: 153 MG/DL
POTASSIUM SERPL-SCNC: 4.4 MMOL/L (ref 3.5–5.1)
PROT SERPL-MCNC: 7.1 G/DL (ref 6–8.4)
SODIUM SERPL-SCNC: 137 MMOL/L (ref 136–145)
T4 FREE SERPL-MCNC: 0.84 NG/DL (ref 0.71–1.51)
TRIGL SERPL-MCNC: 725 MG/DL (ref 30–150)
TSH SERPL DL<=0.005 MIU/L-ACNC: 1.81 UIU/ML (ref 0.34–5.6)

## 2023-07-19 PROCEDURE — 3074F PR MOST RECENT SYSTOLIC BLOOD PRESSURE < 130 MM HG: ICD-10-PCS | Mod: CPTII,S$GLB,, | Performed by: NURSE PRACTITIONER

## 2023-07-19 PROCEDURE — 3066F NEPHROPATHY DOC TX: CPT | Mod: CPTII,S$GLB,, | Performed by: NURSE PRACTITIONER

## 2023-07-19 PROCEDURE — 84443 ASSAY THYROID STIM HORMONE: CPT | Performed by: NURSE PRACTITIONER

## 2023-07-19 PROCEDURE — 3008F BODY MASS INDEX DOCD: CPT | Mod: CPTII,S$GLB,, | Performed by: NURSE PRACTITIONER

## 2023-07-19 PROCEDURE — 3079F PR MOST RECENT DIASTOLIC BLOOD PRESSURE 80-89 MM HG: ICD-10-PCS | Mod: CPTII,S$GLB,, | Performed by: NURSE PRACTITIONER

## 2023-07-19 PROCEDURE — 4010F PR ACE/ARB THEARPY RXD/TAKEN: ICD-10-PCS | Mod: CPTII,S$GLB,, | Performed by: NURSE PRACTITIONER

## 2023-07-19 PROCEDURE — 80061 LIPID PANEL: CPT | Performed by: NURSE PRACTITIONER

## 2023-07-19 PROCEDURE — 3052F HG A1C>EQUAL 8.0%<EQUAL 9.0%: CPT | Mod: CPTII,S$GLB,, | Performed by: NURSE PRACTITIONER

## 2023-07-19 PROCEDURE — 4010F ACE/ARB THERAPY RXD/TAKEN: CPT | Mod: CPTII,S$GLB,, | Performed by: NURSE PRACTITIONER

## 2023-07-19 PROCEDURE — 36415 COLL VENOUS BLD VENIPUNCTURE: CPT | Performed by: NURSE PRACTITIONER

## 2023-07-19 PROCEDURE — 1160F PR REVIEW ALL MEDS BY PRESCRIBER/CLIN PHARMACIST DOCUMENTED: ICD-10-PCS | Mod: CPTII,S$GLB,, | Performed by: NURSE PRACTITIONER

## 2023-07-19 PROCEDURE — 84439 ASSAY OF FREE THYROXINE: CPT | Performed by: NURSE PRACTITIONER

## 2023-07-19 PROCEDURE — 3062F PR POS MACROALBUMINURIA RESULT DOCUMENTED/REVIEW: ICD-10-PCS | Mod: CPTII,S$GLB,, | Performed by: NURSE PRACTITIONER

## 2023-07-19 PROCEDURE — 1159F MED LIST DOCD IN RCRD: CPT | Mod: CPTII,S$GLB,, | Performed by: NURSE PRACTITIONER

## 2023-07-19 PROCEDURE — 99214 OFFICE O/P EST MOD 30 MIN: CPT | Mod: S$GLB,,, | Performed by: NURSE PRACTITIONER

## 2023-07-19 PROCEDURE — 3062F POS MACROALBUMINURIA REV: CPT | Mod: CPTII,S$GLB,, | Performed by: NURSE PRACTITIONER

## 2023-07-19 PROCEDURE — 3008F PR BODY MASS INDEX (BMI) DOCUMENTED: ICD-10-PCS | Mod: CPTII,S$GLB,, | Performed by: NURSE PRACTITIONER

## 2023-07-19 PROCEDURE — 1160F RVW MEDS BY RX/DR IN RCRD: CPT | Mod: CPTII,S$GLB,, | Performed by: NURSE PRACTITIONER

## 2023-07-19 PROCEDURE — 99214 PR OFFICE/OUTPT VISIT, EST, LEVL IV, 30-39 MIN: ICD-10-PCS | Mod: S$GLB,,, | Performed by: NURSE PRACTITIONER

## 2023-07-19 PROCEDURE — 80053 COMPREHEN METABOLIC PANEL: CPT | Performed by: NURSE PRACTITIONER

## 2023-07-19 PROCEDURE — 3066F PR DOCUMENTATION OF TREATMENT FOR NEPHROPATHY: ICD-10-PCS | Mod: CPTII,S$GLB,, | Performed by: NURSE PRACTITIONER

## 2023-07-19 PROCEDURE — 83036 HEMOGLOBIN GLYCOSYLATED A1C: CPT | Performed by: NURSE PRACTITIONER

## 2023-07-19 PROCEDURE — 1159F PR MEDICATION LIST DOCUMENTED IN MEDICAL RECORD: ICD-10-PCS | Mod: CPTII,S$GLB,, | Performed by: NURSE PRACTITIONER

## 2023-07-19 PROCEDURE — 3074F SYST BP LT 130 MM HG: CPT | Mod: CPTII,S$GLB,, | Performed by: NURSE PRACTITIONER

## 2023-07-19 PROCEDURE — 3052F PR MOST RECENT HEMOGLOBIN A1C LEVEL 8.0 - < 9.0%: ICD-10-PCS | Mod: CPTII,S$GLB,, | Performed by: NURSE PRACTITIONER

## 2023-07-19 PROCEDURE — 3079F DIAST BP 80-89 MM HG: CPT | Mod: CPTII,S$GLB,, | Performed by: NURSE PRACTITIONER

## 2023-07-19 RX ORDER — ATORVASTATIN CALCIUM 20 MG/1
20 TABLET, FILM COATED ORAL DAILY
Qty: 90 TABLET | Refills: 0 | Status: SHIPPED | OUTPATIENT
Start: 2023-07-19 | End: 2024-01-11

## 2023-07-19 RX ORDER — DULAGLUTIDE 3 MG/.5ML
3 INJECTION, SOLUTION SUBCUTANEOUS
Qty: 4 PEN | Refills: 1 | Status: SHIPPED | OUTPATIENT
Start: 2023-07-19 | End: 2023-10-05

## 2023-07-19 RX ORDER — LISINOPRIL 20 MG/1
20 TABLET ORAL DAILY
Qty: 90 TABLET | Refills: 0 | Status: SHIPPED | OUTPATIENT
Start: 2023-07-19 | End: 2023-12-21

## 2023-07-19 RX ORDER — METFORMIN HYDROCHLORIDE 500 MG/1
1000 TABLET ORAL 2 TIMES DAILY WITH MEALS
Qty: 360 TABLET | Refills: 0 | Status: SHIPPED | OUTPATIENT
Start: 2023-07-19

## 2023-07-19 RX ORDER — ESCITALOPRAM OXALATE 10 MG/1
10 TABLET ORAL DAILY
Qty: 90 TABLET | Refills: 0 | Status: SHIPPED | OUTPATIENT
Start: 2023-07-19 | End: 2024-01-18

## 2023-07-19 RX ORDER — OMEGA-3-ACID ETHYL ESTERS 1 G/1
2 CAPSULE, LIQUID FILLED ORAL 2 TIMES DAILY
Qty: 360 CAPSULE | Refills: 1 | Status: SHIPPED | OUTPATIENT
Start: 2023-07-19 | End: 2024-01-25

## 2023-07-19 NOTE — PROGRESS NOTES
SUBJECTIVE:      Patient ID: Tsering Carlos is a 48 y.o. female.    Chief Complaint: Hypertension    Patient is here today to f/u on DM, hypothyroidism, HTN and hyperlipidemia. She is back on all medication and taking it as prescribed. A1c is improved but not at goal.     Diabetes  She presents for her follow-up diabetic visit. She has type 2 diabetes mellitus. Her disease course has been improving. Hypoglycemia symptoms include nervousness/anxiousness (stable ). Pertinent negatives for hypoglycemia include no confusion, dizziness, headaches, pallor, speech difficulty or sweats. Pertinent negatives for diabetes include no blurred vision, no chest pain, no fatigue, no foot paresthesias, no foot ulcerations, no polydipsia, no polyphagia, no polyuria, no visual change, no weakness and no weight loss. There are no hypoglycemic complications. There are no diabetic complications. Risk factors for coronary artery disease include hypertension, obesity, sedentary lifestyle, dyslipidemia, family history and diabetes mellitus. Current diabetic treatment includes oral agent (monotherapy) (Trulicity ). She is compliant with treatment most of the time. She is following a generally healthy diet. When asked about meal planning, she reported none. She rarely participates in exercise. Home blood sugar record trend: not checking  An ACE inhibitor/angiotensin II receptor blocker is being taken. She does not see a podiatrist.Eye exam is not current.   Hypertension  This is a chronic problem. The current episode started more than 1 year ago. The problem is controlled. Associated symptoms include anxiety. Pertinent negatives include no blurred vision, chest pain, headaches, malaise/fatigue, neck pain, palpitations, peripheral edema, shortness of breath or sweats. There are no associated agents to hypertension. Risk factors for coronary artery disease include obesity, sedentary lifestyle, dyslipidemia, diabetes mellitus and family  history. Past treatments include ACE inhibitors. The current treatment provides moderate improvement. Compliance problems include exercise and diet.  Identifiable causes of hypertension include a thyroid problem. There is no history of chronic renal disease.   Hyperlipidemia  This is a chronic problem. The current episode started more than 1 year ago. The problem is controlled. Exacerbating diseases include diabetes, hypothyroidism and obesity. She has no history of chronic renal disease or liver disease. There are no known factors aggravating her hyperlipidemia. Pertinent negatives include no chest pain, leg pain, myalgias or shortness of breath. Current antihyperlipidemic treatment includes statins. The current treatment provides moderate improvement of lipids. Risk factors for coronary artery disease include a sedentary lifestyle, obesity, hypertension, diabetes mellitus, dyslipidemia and family history.   Anxiety  Presents for follow-up visit. Symptoms include excessive worry and nervous/anxious behavior (stable ). Patient reports no chest pain, confusion, dizziness, palpitations, shortness of breath or suicidal ideas. Symptoms occur occasionally. The severity of symptoms is mild. The quality of sleep is good.     Compliance with medications is %.     Past Surgical History:   Procedure Laterality Date    ADENOIDECTOMY  1985    HERNIA REPAIR  03/21/2016    INSERTION OF CONTRACEPTIVE CAPSULE  2012    TONSILLECTOMY  1985    TYMPANOSTOMY TUBE PLACEMENT  1985     Family History   Problem Relation Age of Onset    Hypertension Mother     Stroke Mother     Heart disease Mother     Diabetes Mother     Diabetes Father     Stroke Father     Heart disease Father     Depression Father       Social History     Socioeconomic History    Marital status:    Occupational History    Occupation: business owner   Tobacco Use    Smoking status: Never     Passive exposure: Never    Smokeless tobacco: Never   Substance and  Sexual Activity    Alcohol use: No    Drug use: No     Current Outpatient Medications   Medication Sig Dispense Refill    levothyroxine (SYNTHROID) 100 MCG tablet Take 1 tablet (100 mcg total) by mouth once daily. 90 tablet 0    nitroGLYCERIN (NITROSTAT) 0.4 MG SL tablet Place 0.4 mg under the tongue every 5 (five) minutes as needed for Chest pain.      atorvastatin (LIPITOR) 20 MG tablet Take 1 tablet (20 mg total) by mouth once daily. 90 tablet 0    blood sugar diagnostic Strp Use 1 strip daily to check blood sugar 100 strip 1    dulaglutide (TRULICITY) 3 mg/0.5 mL pen injector Inject 3 mg into the skin every 7 days. 4 pen 1    EScitalopram oxalate (LEXAPRO) 10 MG tablet Take 1 tablet (10 mg total) by mouth once daily. 90 tablet 0    lancing device with lancets Kit 1 Units by Misc.(Non-Drug; Combo Route) route once daily. 100 each 0    lisinopriL (PRINIVIL,ZESTRIL) 20 MG tablet Take 1 tablet (20 mg total) by mouth once daily. 90 tablet 0    metFORMIN (GLUCOPHAGE) 500 MG tablet Take 2 tablets (1,000 mg total) by mouth 2 (two) times daily with meals. 360 tablet 0    omega-3 acid ethyl esters (LOVAZA) 1 gram capsule Take 2 capsules (2 g total) by mouth 2 (two) times daily. 360 capsule 1     No current facility-administered medications for this visit.     Review of patient's allergies indicates:  No Known Allergies   Past Medical History:   Diagnosis Date    Anxiety     Breast cyst     4 mm cyst according to mammo, repeat test 5/2016    Diabetes mellitus, type 2     High calcium levels     Hypertension     Hypertriglyceridemia     Hypothyroidism     Umbilical hernia     since 2009, worse after giving birth     Past Surgical History:   Procedure Laterality Date    ADENOIDECTOMY  1985    HERNIA REPAIR  03/21/2016    INSERTION OF CONTRACEPTIVE CAPSULE  2012    TONSILLECTOMY  1985    TYMPANOSTOMY TUBE PLACEMENT  1985       Review of Systems   Constitutional:  Negative for appetite change, chills, diaphoresis, fatigue,  "malaise/fatigue, unexpected weight change and weight loss.   HENT:  Negative for ear discharge, hearing loss, trouble swallowing and voice change.    Eyes:  Negative for blurred vision, photophobia and pain.   Respiratory:  Negative for chest tightness, shortness of breath and stridor.    Cardiovascular:  Negative for chest pain, palpitations and leg swelling.   Gastrointestinal:  Negative for abdominal pain, blood in stool and vomiting.   Endocrine: Negative for cold intolerance, heat intolerance, polydipsia, polyphagia and polyuria.   Genitourinary:  Negative for difficulty urinating and flank pain.   Musculoskeletal:  Negative for joint swelling, myalgias, neck pain and neck stiffness.   Skin:  Negative for pallor.   Neurological:  Negative for dizziness, speech difficulty, weakness and headaches.   Hematological:  Does not bruise/bleed easily.   Psychiatric/Behavioral:  Negative for confusion, dysphoric mood, self-injury, sleep disturbance and suicidal ideas. The patient is nervous/anxious (stable ).     OBJECTIVE:      Vitals:    07/19/23 1311   BP: 120/80   Pulse: 99   Temp: 98.4 °F (36.9 °C)   SpO2: 98%   Weight: 88.9 kg (196 lb)   Height: 5' 4" (1.626 m)     Physical Exam  Vitals and nursing note reviewed.   Constitutional:       General: She is not in acute distress.     Appearance: She is well-developed.   HENT:      Head: Normocephalic and atraumatic.      Right Ear: Tympanic membrane normal.      Left Ear: Tympanic membrane normal.      Nose: Nose normal.      Mouth/Throat:      Pharynx: Uvula midline.   Eyes:      General: Lids are normal.      Conjunctiva/sclera: Conjunctivae normal.      Pupils: Pupils are equal, round, and reactive to light.      Right eye: Pupil is round and reactive.      Left eye: Pupil is round and reactive.   Neck:      Thyroid: No thyromegaly.      Vascular: No JVD.      Trachea: Trachea normal.   Cardiovascular:      Rate and Rhythm: Normal rate and regular rhythm.      " Pulses: Normal pulses.      Heart sounds: Normal heart sounds. No murmur heard.  Pulmonary:      Effort: Pulmonary effort is normal. No tachypnea or respiratory distress.      Breath sounds: Normal breath sounds. No wheezing, rhonchi or rales.   Abdominal:      General: Bowel sounds are normal.      Palpations: Abdomen is soft.      Tenderness: There is no abdominal tenderness.   Musculoskeletal:         General: Normal range of motion.      Cervical back: Normal range of motion and neck supple.      Right lower leg: No edema.      Left lower leg: No edema.   Lymphadenopathy:      Cervical: No cervical adenopathy.   Skin:     General: Skin is warm and dry.      Findings: No rash.   Neurological:      Mental Status: She is alert and oriented to person, place, and time.   Psychiatric:         Mood and Affect: Mood normal.         Speech: Speech normal.         Behavior: Behavior normal. Behavior is cooperative.         Thought Content: Thought content normal.         Judgment: Judgment normal.        Lab Visit on 07/19/2023   Component Date Value Ref Range Status    Sodium 07/19/2023 137  136 - 145 mmol/L Final    Potassium 07/19/2023 4.4  3.5 - 5.1 mmol/L Final    Chloride 07/19/2023 100  95 - 110 mmol/L Final    CO2 07/19/2023 26  23 - 29 mmol/L Final    Glucose 07/19/2023 169 (H)  70 - 110 mg/dL Final    BUN 07/19/2023 11  6 - 20 mg/dL Final    Creatinine 07/19/2023 0.6  0.5 - 1.4 mg/dL Final    Calcium 07/19/2023 8.5 (L)  8.7 - 10.5 mg/dL Final    Total Protein 07/19/2023 7.1  6.0 - 8.4 g/dL Final    Albumin 07/19/2023 4.0  3.5 - 5.2 g/dL Final    Total Bilirubin 07/19/2023 0.8  0.1 - 1.0 mg/dL Final    Comment: For infants and newborns, interpretation of results should be based  on gestational age, weight and in agreement with clinical  observations.    Premature Infant recommended reference ranges:  Up to 24 hours.............<8.0 mg/dL  Up to 48 hours............<12.0 mg/dL  3-5 days..................<15.0  mg/dL  6-29 days.................<15.0 mg/dL      Alkaline Phosphatase 07/19/2023 44 (L)  55 - 135 U/L Final    AST 07/19/2023 37  10 - 40 U/L Final    ALT 07/19/2023 27  10 - 44 U/L Final    eGFR 07/19/2023 >60.0  >60 mL/min/1.73 m^2 Final    Anion Gap 07/19/2023 11  8 - 16 mmol/L Final    Cholesterol 07/19/2023 186  120 - 199 mg/dL Final    Comment: The National Cholesterol Education Program (NCEP) has set the  following guidelines (reference ranges) for Cholesterol:  Optimal.....................<200 mg/dL  Borderline High.............200-239 mg/dL  High........................> or = 240 mg/dL      Triglycerides 07/19/2023 725 (H)  30 - 150 mg/dL Final    Comment: The National Cholesterol Education Program (NCEP) has set the  following guidelines (reference values) for triglycerides:  Normal......................<150 mg/dL  Borderline High.............150-199 mg/dL  High........................200-499 mg/dL      HDL 07/19/2023 33 (L)  40 - 75 mg/dL Final    Comment: The National Cholesterol Education Program (NCEP) has set the  following guidelines (reference values) for HDL Cholesterol:  Low...............<40 mg/dL  Optimal...........>60 mg/dL      LDL Cholesterol 07/19/2023 Invalid, Trig>400.0  63.0 - 159.0 mg/dL Final    Comment: The National Cholesterol Education Program (NCEP) has set the  following guidelines (reference values) for LDL Cholesterol:  Optimal.......................<130 mg/dL  Borderline High...............130-159 mg/dL  High..........................160-189 mg/dL  Very High.....................>190 mg/dL      HDL/Cholesterol Ratio 07/19/2023 17.7 (L)  20.0 - 50.0 % Final    Total Cholesterol/HDL Ratio 07/19/2023 5.6 (H)  2.0 - 5.0 Final    Non-HDL Cholesterol 07/19/2023 153  mg/dL Final    Comment: Risk category and Non-HDL cholesterol goals:  Coronary heart disease (CHD)or equivalent (10-year risk of CHD >20%):  Non-HDL cholesterol goal     <130 mg/dL  Two or more CHD risk factors and 10-year  risk of CHD <= 20%:  Non-HDL cholesterol goal     <160 mg/dL  0 to 1 CHD risk factor:  Non-HDL cholesterol goal     <190 mg/dL      TSH 07/19/2023 1.810  0.340 - 5.600 uIU/mL Final    Hemoglobin A1C 07/19/2023 8.5 (H)  4.5 - 6.2 % Final    Comment: According to ADA guidelines, hemoglobin A1C <7.0% represents  optimal control in non-pregnant diabetic patients.  Different  metrics may apply to specific populations.   Standards of Medical Care in Diabetes - 2016.    For the purpose of screening for the presence of diabetes:  <5.7%     Consistent with the absence of diabetes  5.7-6.4%  Consistent with increasing risk for diabetes   (prediabetes)  >or=6.5%  Consistent with diabetes    Currently no consensus exists for use of hemoglobin A1C  for diagnosis of diabetes for children.      Estimated Avg Glucose 07/19/2023 197 (H)  68 - 131 mg/dL Final    Free T4 07/19/2023 0.84  0.71 - 1.51 ng/dL Final   ]    Last visit note, most recent available labs, and health maintenance reviewed    Assessment:       1. Mixed hyperlipidemia    2. Benign hypertension    3. Hypothyroidism, unspecified type    4. Hypertriglyceridemia    5. Diabetes mellitus without complication    6. Anxiety        Plan:       Mixed hyperlipidemia  -   start  omega-3 acid ethyl esters (LOVAZA) 1 gram capsule; Take 2 capsules (2 g total) by mouth 2 (two) times daily.  Dispense: 360 capsule; Refill: 1  -     atorvastatin (LIPITOR) 20 MG tablet; Take 1 tablet (20 mg total) by mouth once daily.  Dispense: 90 tablet; Refill: 0  -     Comprehensive Metabolic Panel; Future; Expected date: 08/02/2023  -     Lipid Panel; Future; Expected date: 08/02/2023  -     Hemoglobin A1C; Future; Expected date: 08/02/2023  -     TSH; Future; Expected date: 08/30/2023    Benign hypertension  -     lisinopriL (PRINIVIL,ZESTRIL) 20 MG tablet; Take 1 tablet (20 mg total) by mouth once daily.  Dispense: 90 tablet; Refill: 0    Hypothyroidism, unspecified type  -     Comprehensive  Metabolic Panel; Future; Expected date: 08/02/2023  -     Lipid Panel; Future; Expected date: 08/02/2023  -     Hemoglobin A1C; Future; Expected date: 08/02/2023  -     TSH; Future; Expected date: 08/30/2023    Hypertriglyceridemia  -     omega-3 acid ethyl esters (LOVAZA) 1 gram capsule; Take 2 capsules (2 g total) by mouth 2 (two) times daily.  Dispense: 360 capsule; Refill: 1  -     Comprehensive Metabolic Panel; Future; Expected date: 08/02/2023  -     Lipid Panel; Future; Expected date: 08/02/2023  -     Hemoglobin A1C; Future; Expected date: 08/02/2023  -     TSH; Future; Expected date: 08/30/2023    Diabetes mellitus without complication  -  increase   metFORMIN (GLUCOPHAGE) 500 MG tablet; Take 2 tablets (1,000 mg total) by mouth 2 (two) times daily with meals.  Dispense: 360 tablet; Refill: 0  -   increase  dulaglutide (TRULICITY) 3 mg/0.5 mL pen injector; Inject 3 mg into the skin every 7 days.  Dispense: 4 pen ; Refill: 1  -     Comprehensive Metabolic Panel; Future; Expected date: 08/02/2023  -     Lipid Panel; Future; Expected date: 08/02/2023  -     Hemoglobin A1C; Future; Expected date: 08/02/2023  -     TSH; Future; Expected date: 08/30/2023    Anxiety  -     EScitalopram oxalate (LEXAPRO) 10 MG tablet; Take 1 tablet (10 mg total) by mouth once daily.  Dispense: 90 tablet; Refill: 0        Follow up in about 4 months (around 11/7/2023) for dm.      7/19/2023 JOSE Cervantes, FNP

## 2023-07-23 NOTE — ASSESSMENT & PLAN NOTE
Continue lexapro. Previously tried augmentation with bupropion due to pt's previous c/o fatigue and difficulty concentrating, but it caused increased anxiety.   
Continue on lipitor 20mg and tricor 160mg.  TG's improved from 400 to 200 on lipids 4/2019. LDL 76.   
Continue synthroid 100mcg daily. TSH normal 4/2019.   
HbA1c up to 8.3% 1/2019. Fasting AM glucose improving after addition of trulicity.  Continue metformin 500mg BID, trulicity.   Check HbA1c. Foot exam 1/2019. Eye exam scheduled for today.   
Poor sleep hygiene and frequent awakenings likely cause of excessive fatigue. Pt advised to go to be earlier and try to get all 7-8 hours at once.  Pt declined referral for sleep study.   
Well controlled on lisinopril 10mg. Normal CMP 4/2019.    
show

## 2023-08-01 ENCOUNTER — TELEPHONE (OUTPATIENT)
Dept: FAMILY MEDICINE | Facility: CLINIC | Age: 48
End: 2023-08-01

## 2023-08-01 DIAGNOSIS — E78.2 MIXED HYPERLIPIDEMIA: ICD-10-CM

## 2023-08-01 DIAGNOSIS — E78.1 HYPERTRIGLYCERIDEMIA: Primary | ICD-10-CM

## 2023-08-01 RX ORDER — ICOSAPENT ETHYL 1000 MG/1
2 CAPSULE ORAL 2 TIMES DAILY
Qty: 360 CAPSULE | Refills: 0 | Status: SHIPPED | OUTPATIENT
Start: 2023-08-01 | End: 2023-10-30

## 2023-08-07 DIAGNOSIS — E11.65 TYPE 2 DIABETES MELLITUS WITH HYPERGLYCEMIA, WITHOUT LONG-TERM CURRENT USE OF INSULIN: ICD-10-CM

## 2023-08-07 RX ORDER — LANCETS 26 GAUGE
1 EACH MISCELLANEOUS DAILY
Qty: 100 EACH | Refills: 0 | Status: SHIPPED | OUTPATIENT
Start: 2023-08-07

## 2023-08-07 NOTE — TELEPHONE ENCOUNTER
Pt also needs lantus called in to her pharmacy. She ran out. Brand and generic Vascepa not covered by her insurance. Pharmacy will be sending PA request.

## 2023-09-14 DIAGNOSIS — E03.8 SUBCLINICAL HYPOTHYROIDISM: ICD-10-CM

## 2023-09-14 RX ORDER — LEVOTHYROXINE SODIUM 100 UG/1
100 TABLET ORAL DAILY
Qty: 90 TABLET | Refills: 0 | Status: SHIPPED | OUTPATIENT
Start: 2023-09-14 | End: 2023-12-18

## 2023-10-04 DIAGNOSIS — E11.9 DIABETES MELLITUS WITHOUT COMPLICATION: ICD-10-CM

## 2023-10-05 RX ORDER — DULAGLUTIDE 3 MG/.5ML
3 INJECTION, SOLUTION SUBCUTANEOUS
Qty: 4 PEN | Refills: 1 | Status: SHIPPED | OUTPATIENT
Start: 2023-10-05 | End: 2024-01-25 | Stop reason: SDUPTHER

## 2023-12-18 DIAGNOSIS — E03.8 SUBCLINICAL HYPOTHYROIDISM: ICD-10-CM

## 2023-12-18 RX ORDER — LEVOTHYROXINE SODIUM 100 UG/1
100 TABLET ORAL
Qty: 90 TABLET | Refills: 0 | Status: SHIPPED | OUTPATIENT
Start: 2023-12-18 | End: 2024-01-25 | Stop reason: SDUPTHER

## 2023-12-20 DIAGNOSIS — I10 BENIGN HYPERTENSION: ICD-10-CM

## 2023-12-21 RX ORDER — LISINOPRIL 20 MG/1
20 TABLET ORAL DAILY
Qty: 90 TABLET | Refills: 0 | Status: SHIPPED | OUTPATIENT
Start: 2023-12-21 | End: 2024-01-25 | Stop reason: SDUPTHER

## 2024-01-11 DIAGNOSIS — E78.2 MIXED HYPERLIPIDEMIA: ICD-10-CM

## 2024-01-11 RX ORDER — ATORVASTATIN CALCIUM 20 MG/1
20 TABLET, FILM COATED ORAL
Qty: 90 TABLET | Refills: 0 | Status: SHIPPED | OUTPATIENT
Start: 2024-01-11 | End: 2024-01-25 | Stop reason: SDUPTHER

## 2024-01-18 DIAGNOSIS — F41.9 ANXIETY: ICD-10-CM

## 2024-01-18 RX ORDER — ESCITALOPRAM OXALATE 10 MG/1
10 TABLET ORAL
Qty: 90 TABLET | Refills: 0 | Status: SHIPPED | OUTPATIENT
Start: 2024-01-18 | End: 2024-01-25 | Stop reason: SDUPTHER

## 2024-01-23 ENCOUNTER — LAB VISIT (OUTPATIENT)
Dept: LAB | Facility: HOSPITAL | Age: 49
End: 2024-01-23
Attending: NURSE PRACTITIONER
Payer: MEDICAID

## 2024-01-23 DIAGNOSIS — E03.9 HYPOTHYROIDISM, UNSPECIFIED TYPE: ICD-10-CM

## 2024-01-23 DIAGNOSIS — E11.9 DIABETES MELLITUS WITHOUT COMPLICATION: ICD-10-CM

## 2024-01-23 DIAGNOSIS — E78.2 MIXED HYPERLIPIDEMIA: ICD-10-CM

## 2024-01-23 DIAGNOSIS — E78.1 HYPERTRIGLYCERIDEMIA: ICD-10-CM

## 2024-01-23 LAB
ALBUMIN SERPL BCP-MCNC: 4.5 G/DL (ref 3.5–5.2)
ALP SERPL-CCNC: 49 U/L (ref 55–135)
ALT SERPL W/O P-5'-P-CCNC: 14 U/L (ref 10–44)
ANION GAP SERPL CALC-SCNC: 9 MMOL/L (ref 8–16)
AST SERPL-CCNC: 19 U/L (ref 10–40)
BILIRUB SERPL-MCNC: 0.6 MG/DL (ref 0.1–1)
BUN SERPL-MCNC: 10 MG/DL (ref 6–20)
CALCIUM SERPL-MCNC: 8.9 MG/DL (ref 8.7–10.5)
CHLORIDE SERPL-SCNC: 102 MMOL/L (ref 95–110)
CHOLEST SERPL-MCNC: 206 MG/DL (ref 120–199)
CHOLEST/HDLC SERPL: 5.2 {RATIO} (ref 2–5)
CO2 SERPL-SCNC: 27 MMOL/L (ref 23–29)
CREAT SERPL-MCNC: 0.6 MG/DL (ref 0.5–1.4)
EST. GFR  (NO RACE VARIABLE): >60 ML/MIN/1.73 M^2
GLUCOSE SERPL-MCNC: 189 MG/DL (ref 70–110)
HDLC SERPL-MCNC: 40 MG/DL (ref 40–75)
HDLC SERPL: 19.4 % (ref 20–50)
LDLC SERPL CALC-MCNC: ABNORMAL MG/DL (ref 63–159)
NONHDLC SERPL-MCNC: 166 MG/DL
POTASSIUM SERPL-SCNC: 4.5 MMOL/L (ref 3.5–5.1)
PROT SERPL-MCNC: 7.3 G/DL (ref 6–8.4)
SODIUM SERPL-SCNC: 138 MMOL/L (ref 136–145)
TRIGL SERPL-MCNC: 806 MG/DL (ref 30–150)
TSH SERPL DL<=0.005 MIU/L-ACNC: 4.77 UIU/ML (ref 0.34–5.6)

## 2024-01-23 PROCEDURE — 84443 ASSAY THYROID STIM HORMONE: CPT | Performed by: NURSE PRACTITIONER

## 2024-01-23 PROCEDURE — 83036 HEMOGLOBIN GLYCOSYLATED A1C: CPT | Performed by: NURSE PRACTITIONER

## 2024-01-23 PROCEDURE — 80061 LIPID PANEL: CPT | Performed by: NURSE PRACTITIONER

## 2024-01-23 PROCEDURE — 36415 COLL VENOUS BLD VENIPUNCTURE: CPT | Performed by: NURSE PRACTITIONER

## 2024-01-23 PROCEDURE — 80053 COMPREHEN METABOLIC PANEL: CPT | Performed by: NURSE PRACTITIONER

## 2024-01-24 ENCOUNTER — TELEPHONE (OUTPATIENT)
Dept: FAMILY MEDICINE | Facility: CLINIC | Age: 49
End: 2024-01-24
Payer: MEDICAID

## 2024-01-24 LAB
ESTIMATED AVG GLUCOSE: 237 MG/DL (ref 68–131)
HBA1C MFR BLD: 9.9 % (ref 4.5–6.2)

## 2024-01-24 NOTE — PROGRESS NOTES
SUBJECTIVE:      Patient ID: Tsering Carlos is a 48 y.o. female.    Chief Complaint: Diabetes    Patient is here today to f/u on DM, hypothyroidism, HTN and hyperlipidemia. She has been out of trulicity for over a month due to backorder. The vascepa and lovaza were denied from insurance. She is taking the lipitor. Taking metfromin 3 tabs daily instead of 4, she misunderstood how to take it.     Diabetes  She presents for her follow-up diabetic visit. She has type 2 diabetes mellitus. Her disease course has been worsening. Hypoglycemia symptoms include nervousness/anxiousness (stable ). Pertinent negatives for hypoglycemia include no confusion, dizziness, headaches, pallor, speech difficulty or sweats. Associated symptoms include foot paresthesias. Pertinent negatives for diabetes include no blurred vision, no chest pain, no fatigue, no foot ulcerations, no polydipsia, no polyphagia, no polyuria, no visual change, no weakness and no weight loss. There are no hypoglycemic complications. Symptoms are worsening. Diabetic complications include peripheral neuropathy. Risk factors for coronary artery disease include hypertension, obesity, sedentary lifestyle, dyslipidemia, family history and diabetes mellitus. Current diabetic treatment includes oral agent (monotherapy) (Trulicity ). She is compliant with treatment most of the time. She is following a generally healthy diet. Meal planning includes avoidance of concentrated sweets. She rarely participates in exercise. Home blood sugar record trend: not checking  An ACE inhibitor/angiotensin II receptor blocker is being taken. She does not see a podiatrist.Eye exam is not current.   Hypertension  This is a chronic problem. The current episode started more than 1 year ago. The problem is controlled. Associated symptoms include anxiety. Pertinent negatives include no blurred vision, chest pain, headaches, malaise/fatigue, neck pain, palpitations, peripheral edema,  shortness of breath or sweats. There are no associated agents to hypertension. Risk factors for coronary artery disease include obesity, sedentary lifestyle, dyslipidemia, diabetes mellitus and family history. Past treatments include ACE inhibitors. The current treatment provides moderate improvement. Compliance problems include exercise and diet.  Identifiable causes of hypertension include a thyroid problem. There is no history of chronic renal disease.   Hyperlipidemia  This is a chronic problem. The current episode started more than 1 year ago. The problem is controlled. Exacerbating diseases include diabetes, hypothyroidism and obesity. She has no history of chronic renal disease or liver disease. There are no known factors aggravating her hyperlipidemia. Pertinent negatives include no chest pain, leg pain, myalgias or shortness of breath. Current antihyperlipidemic treatment includes statins. The current treatment provides moderate improvement of lipids. Risk factors for coronary artery disease include a sedentary lifestyle, obesity, hypertension, diabetes mellitus, dyslipidemia and family history.   Anxiety  Presents for follow-up visit. Symptoms include excessive worry and nervous/anxious behavior (stable ). Patient reports no chest pain, confusion, dizziness, palpitations, shortness of breath or suicidal ideas. Symptoms occur occasionally. The severity of symptoms is mild. The quality of sleep is good.     Compliance with medications is %.       Past Surgical History:   Procedure Laterality Date    ADENOIDECTOMY  1985    HERNIA REPAIR  03/21/2016    INSERTION OF CONTRACEPTIVE CAPSULE  2012    TONSILLECTOMY  1985    TYMPANOSTOMY TUBE PLACEMENT  1985     Family History   Problem Relation Age of Onset    Hypertension Mother     Stroke Mother     Heart disease Mother     Diabetes Mother     Diabetes Father     Stroke Father     Heart disease Father     Depression Father       Social History      Socioeconomic History    Marital status:    Occupational History    Occupation: business owner   Tobacco Use    Smoking status: Never     Passive exposure: Never    Smokeless tobacco: Never   Substance and Sexual Activity    Alcohol use: No    Drug use: No     Current Outpatient Medications   Medication Sig Dispense Refill    blood sugar diagnostic Strp Use 1 strip daily to check blood sugar 100 strip 1    metFORMIN (GLUCOPHAGE) 500 MG tablet Take 2 tablets (1,000 mg total) by mouth 2 (two) times daily with meals. 360 tablet 0    nitroGLYCERIN (NITROSTAT) 0.4 MG SL tablet Place 0.4 mg under the tongue every 5 (five) minutes as needed for Chest pain.      atorvastatin (LIPITOR) 20 MG tablet Take 1 tablet (20 mg total) by mouth every evening. 90 tablet 1    EScitalopram oxalate (LEXAPRO) 10 MG tablet Take 1 tablet (10 mg total) by mouth every evening. 90 tablet 1    fenofibrate 160 MG Tab Take 1 tablet (160 mg total) by mouth once daily. 90 tablet 0    lancing device with lancets Kit 1 Units by Misc.(Non-Drug; Combo Route) route once daily. 100 each 0    levothyroxine (SYNTHROID) 100 MCG tablet Take 1 tablet (100 mcg total) by mouth before breakfast. 90 tablet 1    lisinopriL (PRINIVIL,ZESTRIL) 20 MG tablet Take 1 tablet (20 mg total) by mouth once daily. 90 tablet 1    tirzepatide (MOUNJARO) 5 mg/0.5 mL PnIj Inject 5 mg into the skin every 7 days. 4 Pen 1     No current facility-administered medications for this visit.     Review of patient's allergies indicates:  No Known Allergies   Past Medical History:   Diagnosis Date    Anxiety     Breast cyst     4 mm cyst according to mammo, repeat test 5/2016    Diabetes mellitus, type 2     High calcium levels     Hypertension     Hypertriglyceridemia     Hypothyroidism     Umbilical hernia     since 2009, worse after giving birth     Past Surgical History:   Procedure Laterality Date    ADENOIDECTOMY  1985    HERNIA REPAIR  03/21/2016    INSERTION OF  "CONTRACEPTIVE CAPSULE  2012    TONSILLECTOMY  1985    TYMPANOSTOMY TUBE PLACEMENT  1985       Review of Systems   Constitutional:  Negative for appetite change, chills, diaphoresis, fatigue, malaise/fatigue, unexpected weight change and weight loss.   HENT:  Negative for ear discharge, hearing loss, trouble swallowing and voice change.    Eyes:  Negative for blurred vision, photophobia and pain.   Respiratory:  Negative for chest tightness, shortness of breath and stridor.    Cardiovascular:  Negative for chest pain and palpitations.   Gastrointestinal:  Negative for abdominal pain, blood in stool and vomiting.   Endocrine: Negative for cold intolerance, heat intolerance, polydipsia, polyphagia and polyuria.   Genitourinary:  Negative for difficulty urinating and flank pain.   Musculoskeletal:  Negative for joint swelling, myalgias, neck pain and neck stiffness.   Skin:  Negative for pallor.   Neurological:  Negative for dizziness, speech difficulty, weakness and headaches.   Hematological:  Does not bruise/bleed easily.   Psychiatric/Behavioral:  Negative for confusion, dysphoric mood, self-injury, sleep disturbance and suicidal ideas. The patient is nervous/anxious (stable ).       OBJECTIVE:      Vitals:    01/25/24 1024   BP: 120/82   Pulse: (P) 85   SpO2: 98%   Weight: 89.8 kg (198 lb)   Height: 5' 4" (1.626 m)     Physical Exam  Vitals and nursing note reviewed.   Constitutional:       General: She is not in acute distress.     Appearance: She is well-developed.   HENT:      Head: Normocephalic and atraumatic.      Right Ear: Tympanic membrane normal.      Left Ear: Tympanic membrane normal.      Nose: Nose normal.      Mouth/Throat:      Pharynx: Uvula midline.   Eyes:      General: Lids are normal.      Conjunctiva/sclera: Conjunctivae normal.      Pupils: Pupils are equal, round, and reactive to light.      Right eye: Pupil is round and reactive.      Left eye: Pupil is round and reactive.   Neck:      " Thyroid: No thyromegaly.      Vascular: No JVD.      Trachea: Trachea normal.   Cardiovascular:      Rate and Rhythm: Normal rate and regular rhythm.      Pulses: Normal pulses.      Heart sounds: Normal heart sounds. No murmur heard.  Pulmonary:      Effort: Pulmonary effort is normal. No tachypnea or respiratory distress.      Breath sounds: Normal breath sounds. No wheezing, rhonchi or rales.   Abdominal:      General: Bowel sounds are normal.      Palpations: Abdomen is soft.      Tenderness: There is no abdominal tenderness.   Musculoskeletal:         General: Normal range of motion.      Cervical back: Normal range of motion and neck supple.      Right lower leg: No edema.      Left lower leg: No edema.   Lymphadenopathy:      Cervical: No cervical adenopathy.   Skin:     General: Skin is warm and dry.      Findings: No rash.   Neurological:      Mental Status: She is alert and oriented to person, place, and time.   Psychiatric:         Mood and Affect: Mood normal.         Speech: Speech normal.         Behavior: Behavior normal. Behavior is cooperative.         Thought Content: Thought content normal.         Judgment: Judgment normal.        Lab Visit on 01/23/2024   Component Date Value Ref Range Status    Sodium 01/23/2024 138  136 - 145 mmol/L Final    Potassium 01/23/2024 4.5  3.5 - 5.1 mmol/L Final    Chloride 01/23/2024 102  95 - 110 mmol/L Final    CO2 01/23/2024 27  23 - 29 mmol/L Final    Glucose 01/23/2024 189 (H)  70 - 110 mg/dL Final    BUN 01/23/2024 10  6 - 20 mg/dL Final    Creatinine 01/23/2024 0.6  0.5 - 1.4 mg/dL Final    Calcium 01/23/2024 8.9  8.7 - 10.5 mg/dL Final    Total Protein 01/23/2024 7.3  6.0 - 8.4 g/dL Final    Albumin 01/23/2024 4.5  3.5 - 5.2 g/dL Final    Total Bilirubin 01/23/2024 0.6  0.1 - 1.0 mg/dL Final    Comment: For infants and newborns, interpretation of results should be based  on gestational age, weight and in agreement with clinical  observations.    Premature  Infant recommended reference ranges:  Up to 24 hours.............<8.0 mg/dL  Up to 48 hours............<12.0 mg/dL  3-5 days..................<15.0 mg/dL  6-29 days.................<15.0 mg/dL      Alkaline Phosphatase 01/23/2024 49 (L)  55 - 135 U/L Final    AST 01/23/2024 19  10 - 40 U/L Final    ALT 01/23/2024 14  10 - 44 U/L Final    eGFR 01/23/2024 >60.0  >60 mL/min/1.73 m^2 Final    Anion Gap 01/23/2024 9  8 - 16 mmol/L Final    Cholesterol 01/23/2024 206 (H)  120 - 199 mg/dL Final    Comment: The National Cholesterol Education Program (NCEP) has set the  following guidelines (reference ranges) for Cholesterol:  Optimal.....................<200 mg/dL  Borderline High.............200-239 mg/dL  High........................> or = 240 mg/dL      Triglycerides 01/23/2024 806 (H)  30 - 150 mg/dL Final    Comment: The National Cholesterol Education Program (NCEP) has set the  following guidelines (reference values) for triglycerides:  Normal......................<150 mg/dL  Borderline High.............150-199 mg/dL  High........................200-499 mg/dL      HDL 01/23/2024 40  40 - 75 mg/dL Final    Comment: The National Cholesterol Education Program (NCEP) has set the  following guidelines (reference values) for HDL Cholesterol:  Low...............<40 mg/dL  Optimal...........>60 mg/dL      LDL Cholesterol 01/23/2024 Invalid, Trig>400.0  63.0 - 159.0 mg/dL Final    Comment: The National Cholesterol Education Program (NCEP) has set the  following guidelines (reference values) for LDL Cholesterol:  Optimal.......................<130 mg/dL  Borderline High...............130-159 mg/dL  High..........................160-189 mg/dL  Very High.....................>190 mg/dL      HDL/Cholesterol Ratio 01/23/2024 19.4 (L)  20.0 - 50.0 % Final    Total Cholesterol/HDL Ratio 01/23/2024 5.2 (H)  2.0 - 5.0 Final    Non-HDL Cholesterol 01/23/2024 166  mg/dL Final    Comment: Risk category and Non-HDL cholesterol  goals:  Coronary heart disease (CHD)or equivalent (10-year risk of CHD >20%):  Non-HDL cholesterol goal     <130 mg/dL  Two or more CHD risk factors and 10-year risk of CHD <= 20%:  Non-HDL cholesterol goal     <160 mg/dL  0 to 1 CHD risk factor:  Non-HDL cholesterol goal     <190 mg/dL      TSH 01/23/2024 4.769  0.340 - 5.600 uIU/mL Final    Hemoglobin A1C 01/23/2024 9.9 (H)  4.5 - 6.2 % Final    Comment: According to ADA guidelines, hemoglobin A1C <7.0% represents  optimal control in non-pregnant diabetic patients.  Different  metrics may apply to specific populations.   Standards of Medical Care in Diabetes - 2016.    For the purpose of screening for the presence of diabetes:  <5.7%     Consistent with the absence of diabetes  5.7-6.4%  Consistent with increasing risk for diabetes   (prediabetes)  >or=6.5%  Consistent with diabetes    Currently no consensus exists for use of hemoglobin A1C  for diagnosis of diabetes for children.      Estimated Avg Glucose 01/23/2024 237 (H)  68 - 131 mg/dL Final   [  Assessment:       1. Hypertriglyceridemia    2. Benign hypertension    3. Type 2 diabetes mellitus with hyperglycemia, without long-term current use of insulin    4. Anxiety    5. Mixed hyperlipidemia    6. Subclinical hypothyroidism        Plan:       Hypertriglyceridemia  -  start fenofibrate 160 MG Tab; Take 1 tablet (160 mg total) by mouth once daily.  Dispense: 90 tablet; Refill: 0  Cont lipitor    Benign hypertension  -     lisinopriL (PRINIVIL,ZESTRIL) 20 MG tablet; Take 1 tablet (20 mg total) by mouth once daily.  Dispense: 90 tablet; Refill: 1    Type 2 diabetes mellitus with hyperglycemia, without long-term current use of insulin  -  start   tirzepatide (MOUNJARO) 5 mg/0.5 mL PnIj; Inject 5 mg into the skin every 7 days.  Dispense: 4 Pen; Refill: 1  -     Microalbumin/Creatinine Ratio, Urine; Future; Expected date: 01/26/2024  Trulicity not available and A1c was not controlled on trluicity  Metformin  1000mg bid    Anxiety  -     EScitalopram oxalate (LEXAPRO) 10 MG tablet; Take 1 tablet (10 mg total) by mouth every evening.  Dispense: 90 tablet; Refill: 1    Mixed hyperlipidemia  -     atorvastatin (LIPITOR) 20 MG tablet; Take 1 tablet (20 mg total) by mouth every evening.  Dispense: 90 tablet; Refill: 1    Subclinical hypothyroidism  -     levothyroxine (SYNTHROID) 100 MCG tablet; Take 1 tablet (100 mcg total) by mouth before breakfast.  Dispense: 90 tablet; Refill: 1    Other orders  -     Discontinue: dulaglutide (TRULICITY) 3 mg/0.5 mL pen injector; Inject 3 mg into the skin every 7 days.  Dispense: 4 Pen; Refill: 1        Follow up in about 3 months (around 4/25/2024) for DM.      1/25/2024 JOSE Cervantes, FNP

## 2024-01-24 NOTE — TELEPHONE ENCOUNTER
----- Message from Diego Chiang NP sent at 1/24/2024 10:06 AM CST -----  A1c is elevated. Is she taking her  meds. She has an appt tomorrow. Have her bring meds to her appt

## 2024-01-24 NOTE — TELEPHONE ENCOUNTER
----- Message from Diego Chiang NP sent at 1/24/2024  8:29 AM CST -----  Trigs elevated, is she taking the lovaza and lipitor? Awaiting A1c   no

## 2024-01-25 ENCOUNTER — OFFICE VISIT (OUTPATIENT)
Dept: FAMILY MEDICINE | Facility: CLINIC | Age: 49
End: 2024-01-25
Payer: MEDICAID

## 2024-01-25 VITALS
SYSTOLIC BLOOD PRESSURE: 120 MMHG | OXYGEN SATURATION: 98 % | DIASTOLIC BLOOD PRESSURE: 82 MMHG | BODY MASS INDEX: 33.8 KG/M2 | HEIGHT: 64 IN | WEIGHT: 198 LBS

## 2024-01-25 DIAGNOSIS — E11.65 TYPE 2 DIABETES MELLITUS WITH HYPERGLYCEMIA, WITHOUT LONG-TERM CURRENT USE OF INSULIN: ICD-10-CM

## 2024-01-25 DIAGNOSIS — E78.1 HYPERTRIGLYCERIDEMIA: Primary | ICD-10-CM

## 2024-01-25 DIAGNOSIS — E78.2 MIXED HYPERLIPIDEMIA: ICD-10-CM

## 2024-01-25 DIAGNOSIS — E03.8 SUBCLINICAL HYPOTHYROIDISM: ICD-10-CM

## 2024-01-25 DIAGNOSIS — I10 BENIGN HYPERTENSION: ICD-10-CM

## 2024-01-25 DIAGNOSIS — F41.9 ANXIETY: ICD-10-CM

## 2024-01-25 PROCEDURE — 3074F SYST BP LT 130 MM HG: CPT | Mod: CPTII,S$GLB,, | Performed by: NURSE PRACTITIONER

## 2024-01-25 PROCEDURE — 3079F DIAST BP 80-89 MM HG: CPT | Mod: CPTII,S$GLB,, | Performed by: NURSE PRACTITIONER

## 2024-01-25 PROCEDURE — 1160F RVW MEDS BY RX/DR IN RCRD: CPT | Mod: CPTII,S$GLB,, | Performed by: NURSE PRACTITIONER

## 2024-01-25 PROCEDURE — 3008F BODY MASS INDEX DOCD: CPT | Mod: CPTII,S$GLB,, | Performed by: NURSE PRACTITIONER

## 2024-01-25 PROCEDURE — 99214 OFFICE O/P EST MOD 30 MIN: CPT | Mod: S$GLB,,, | Performed by: NURSE PRACTITIONER

## 2024-01-25 PROCEDURE — 4010F ACE/ARB THERAPY RXD/TAKEN: CPT | Mod: CPTII,S$GLB,, | Performed by: NURSE PRACTITIONER

## 2024-01-25 PROCEDURE — 3046F HEMOGLOBIN A1C LEVEL >9.0%: CPT | Mod: CPTII,S$GLB,, | Performed by: NURSE PRACTITIONER

## 2024-01-25 PROCEDURE — 1159F MED LIST DOCD IN RCRD: CPT | Mod: CPTII,S$GLB,, | Performed by: NURSE PRACTITIONER

## 2024-01-25 RX ORDER — ATORVASTATIN CALCIUM 20 MG/1
20 TABLET, FILM COATED ORAL NIGHTLY
Qty: 90 TABLET | Refills: 1 | Status: SHIPPED | OUTPATIENT
Start: 2024-01-25 | End: 2024-04-03

## 2024-01-25 RX ORDER — FENOFIBRATE 160 MG/1
160 TABLET ORAL DAILY
Qty: 90 TABLET | Refills: 0 | Status: SHIPPED | OUTPATIENT
Start: 2024-01-25 | End: 2024-04-25 | Stop reason: SDUPTHER

## 2024-01-25 RX ORDER — DULAGLUTIDE 3 MG/.5ML
3 INJECTION, SOLUTION SUBCUTANEOUS
Qty: 4 PEN | Refills: 1 | Status: SHIPPED | OUTPATIENT
Start: 2024-01-25 | End: 2024-01-25 | Stop reason: ALTCHOICE

## 2024-01-25 RX ORDER — TIRZEPATIDE 5 MG/.5ML
5 INJECTION, SOLUTION SUBCUTANEOUS
Qty: 4 PEN | Refills: 1 | Status: SHIPPED | OUTPATIENT
Start: 2024-01-25 | End: 2024-03-06

## 2024-01-25 RX ORDER — LEVOTHYROXINE SODIUM 100 UG/1
100 TABLET ORAL
Qty: 90 TABLET | Refills: 1 | Status: SHIPPED | OUTPATIENT
Start: 2024-01-25 | End: 2024-04-03

## 2024-01-25 RX ORDER — ESCITALOPRAM OXALATE 10 MG/1
10 TABLET ORAL NIGHTLY
Qty: 90 TABLET | Refills: 1 | Status: SHIPPED | OUTPATIENT
Start: 2024-01-25 | End: 2024-04-03

## 2024-01-25 RX ORDER — LISINOPRIL 20 MG/1
20 TABLET ORAL DAILY
Qty: 90 TABLET | Refills: 1 | Status: SHIPPED | OUTPATIENT
Start: 2024-01-25 | End: 2024-04-03

## 2024-01-26 LAB
ALBUMIN/CREAT UR: 75 MCG/MG CREAT
CREAT UR-MCNC: 118 MG/DL (ref 20–275)
MICROALBUMIN UR-MCNC: 8.9 MG/DL

## 2024-03-05 ENCOUNTER — PATIENT MESSAGE (OUTPATIENT)
Dept: FAMILY MEDICINE | Facility: CLINIC | Age: 49
End: 2024-03-05
Payer: MEDICAID

## 2024-03-05 DIAGNOSIS — E11.65 TYPE 2 DIABETES MELLITUS WITH HYPERGLYCEMIA, WITHOUT LONG-TERM CURRENT USE OF INSULIN: Primary | ICD-10-CM

## 2024-03-05 DIAGNOSIS — E78.1 HYPERTRIGLYCERIDEMIA: ICD-10-CM

## 2024-03-05 DIAGNOSIS — E11.9 DIABETES MELLITUS WITHOUT COMPLICATION: ICD-10-CM

## 2024-03-05 DIAGNOSIS — E03.9 HYPOTHYROIDISM, UNSPECIFIED TYPE: ICD-10-CM

## 2024-03-05 DIAGNOSIS — Z00.00 PREVENTATIVE HEALTH CARE: ICD-10-CM

## 2024-03-05 DIAGNOSIS — I10 BENIGN HYPERTENSION: ICD-10-CM

## 2024-03-05 DIAGNOSIS — E78.2 MIXED HYPERLIPIDEMIA: ICD-10-CM

## 2024-04-22 ENCOUNTER — TELEPHONE (OUTPATIENT)
Dept: FAMILY MEDICINE | Facility: CLINIC | Age: 49
End: 2024-04-22
Payer: MEDICAID

## 2024-04-25 ENCOUNTER — LAB VISIT (OUTPATIENT)
Dept: LAB | Facility: HOSPITAL | Age: 49
End: 2024-04-25
Attending: NURSE PRACTITIONER
Payer: MEDICAID

## 2024-04-25 DIAGNOSIS — E78.2 MIXED HYPERLIPIDEMIA: ICD-10-CM

## 2024-04-25 DIAGNOSIS — E78.1 HYPERTRIGLYCERIDEMIA: ICD-10-CM

## 2024-04-25 DIAGNOSIS — I10 BENIGN HYPERTENSION: ICD-10-CM

## 2024-04-25 DIAGNOSIS — E03.9 HYPOTHYROIDISM, UNSPECIFIED TYPE: ICD-10-CM

## 2024-04-25 DIAGNOSIS — E11.9 DIABETES MELLITUS WITHOUT COMPLICATION: ICD-10-CM

## 2024-04-25 DIAGNOSIS — Z00.00 PREVENTATIVE HEALTH CARE: ICD-10-CM

## 2024-04-25 LAB
ALBUMIN SERPL BCP-MCNC: 4.8 G/DL (ref 3.5–5.2)
ALP SERPL-CCNC: 25 U/L (ref 55–135)
ALT SERPL W/O P-5'-P-CCNC: 25 U/L (ref 10–44)
ANION GAP SERPL CALC-SCNC: 8 MMOL/L (ref 8–16)
AST SERPL-CCNC: 24 U/L (ref 10–40)
BACTERIA #/AREA URNS HPF: NORMAL /HPF
BASOPHILS # BLD AUTO: 0.07 K/UL (ref 0–0.2)
BASOPHILS NFR BLD: 0.6 % (ref 0–1.9)
BILIRUB SERPL-MCNC: 0.5 MG/DL (ref 0.1–1)
BILIRUB UR QL STRIP: NEGATIVE
BUN SERPL-MCNC: 19 MG/DL (ref 6–20)
CALCIUM SERPL-MCNC: 9.6 MG/DL (ref 8.7–10.5)
CHLORIDE SERPL-SCNC: 105 MMOL/L (ref 95–110)
CHOLEST SERPL-MCNC: 139 MG/DL (ref 120–199)
CHOLEST/HDLC SERPL: 4.1 {RATIO} (ref 2–5)
CLARITY UR: CLEAR
CO2 SERPL-SCNC: 27 MMOL/L (ref 23–29)
COLOR UR: YELLOW
CREAT SERPL-MCNC: 0.8 MG/DL (ref 0.5–1.4)
DIFFERENTIAL METHOD BLD: NORMAL
EOSINOPHIL # BLD AUTO: 0.4 K/UL (ref 0–0.5)
EOSINOPHIL NFR BLD: 4 % (ref 0–8)
ERYTHROCYTE [DISTWIDTH] IN BLOOD BY AUTOMATED COUNT: 12.5 % (ref 11.5–14.5)
EST. GFR  (NO RACE VARIABLE): >60 ML/MIN/1.73 M^2
ESTIMATED AVG GLUCOSE: 154 MG/DL (ref 68–131)
GLUCOSE SERPL-MCNC: 129 MG/DL (ref 70–110)
GLUCOSE UR QL STRIP: NEGATIVE
HBA1C MFR BLD: 7 % (ref 4.5–6.2)
HCT VFR BLD AUTO: 39.9 % (ref 37–48.5)
HDLC SERPL-MCNC: 34 MG/DL (ref 40–75)
HDLC SERPL: 24.5 % (ref 20–50)
HGB BLD-MCNC: 13 G/DL (ref 12–16)
HGB UR QL STRIP: NEGATIVE
IMM GRANULOCYTES # BLD AUTO: 0.03 K/UL (ref 0–0.04)
IMM GRANULOCYTES NFR BLD AUTO: 0.3 % (ref 0–0.5)
KETONES UR QL STRIP: NEGATIVE
LDLC SERPL CALC-MCNC: 70.2 MG/DL (ref 63–159)
LEUKOCYTE ESTERASE UR QL STRIP: ABNORMAL
LYMPHOCYTES # BLD AUTO: 3.2 K/UL (ref 1–4.8)
LYMPHOCYTES NFR BLD: 29.7 % (ref 18–48)
MCH RBC QN AUTO: 29.8 PG (ref 27–31)
MCHC RBC AUTO-ENTMCNC: 32.6 G/DL (ref 32–36)
MCV RBC AUTO: 92 FL (ref 82–98)
MICROSCOPIC COMMENT: NORMAL
MONOCYTES # BLD AUTO: 0.7 K/UL (ref 0.3–1)
MONOCYTES NFR BLD: 6 % (ref 4–15)
NEUTROPHILS # BLD AUTO: 6.5 K/UL (ref 1.8–7.7)
NEUTROPHILS NFR BLD: 59.4 % (ref 38–73)
NITRITE UR QL STRIP: NEGATIVE
NONHDLC SERPL-MCNC: 105 MG/DL
NRBC BLD-RTO: 0 /100 WBC
PH UR STRIP: 5 [PH] (ref 5–8)
PLATELET # BLD AUTO: 356 K/UL (ref 150–450)
PMV BLD AUTO: 9.3 FL (ref 9.2–12.9)
POTASSIUM SERPL-SCNC: 4.6 MMOL/L (ref 3.5–5.1)
PROT SERPL-MCNC: 7.4 G/DL (ref 6–8.4)
PROT UR QL STRIP: NEGATIVE
RBC # BLD AUTO: 4.36 M/UL (ref 4–5.4)
SODIUM SERPL-SCNC: 140 MMOL/L (ref 136–145)
SP GR UR STRIP: 1.02 (ref 1–1.03)
SQUAMOUS #/AREA URNS HPF: 10 /HPF
TRIGL SERPL-MCNC: 174 MG/DL (ref 30–150)
TSH SERPL DL<=0.005 MIU/L-ACNC: 0.36 UIU/ML (ref 0.34–5.6)
URN SPEC COLLECT METH UR: ABNORMAL
UROBILINOGEN UR STRIP-ACNC: NEGATIVE EU/DL
WBC # BLD AUTO: 10.92 K/UL (ref 3.9–12.7)
WBC #/AREA URNS HPF: 4 /HPF (ref 0–5)

## 2024-04-25 PROCEDURE — 80053 COMPREHEN METABOLIC PANEL: CPT | Performed by: NURSE PRACTITIONER

## 2024-04-25 PROCEDURE — 83036 HEMOGLOBIN GLYCOSYLATED A1C: CPT | Performed by: NURSE PRACTITIONER

## 2024-04-25 PROCEDURE — 84443 ASSAY THYROID STIM HORMONE: CPT | Performed by: NURSE PRACTITIONER

## 2024-04-25 PROCEDURE — 36415 COLL VENOUS BLD VENIPUNCTURE: CPT | Performed by: NURSE PRACTITIONER

## 2024-04-25 PROCEDURE — 80061 LIPID PANEL: CPT | Performed by: NURSE PRACTITIONER

## 2024-04-25 PROCEDURE — 81001 URINALYSIS AUTO W/SCOPE: CPT | Performed by: NURSE PRACTITIONER

## 2024-04-25 PROCEDURE — 85025 COMPLETE CBC W/AUTO DIFF WBC: CPT | Performed by: NURSE PRACTITIONER

## 2024-04-26 RX ORDER — FENOFIBRATE 160 MG/1
160 TABLET ORAL DAILY
Qty: 90 TABLET | Refills: 0 | Status: SHIPPED | OUTPATIENT
Start: 2024-04-26 | End: 2024-04-29 | Stop reason: SDUPTHER

## 2024-04-28 NOTE — PROGRESS NOTES
SUBJECTIVE:      Patient ID: Tsering Carlos is a 49 y.o. female.    Chief Complaint: Diabetes    Patient is here today to f/u on DM, hypothyroidism, HTN,  hyperlipidemia and review labs. She was started on mounjaro at her previous ov which she is now out of and needs an alternative due to availability. It did make her bloated on the 3-4th day.     Diabetes  She presents for her follow-up diabetic visit. She has type 2 diabetes mellitus. Her disease course has been improving. Hypoglycemia symptoms include nervousness/anxiousness (stable ). Pertinent negatives for hypoglycemia include no confusion, dizziness, headaches, pallor, speech difficulty or sweats. Associated symptoms include foot paresthesias. Pertinent negatives for diabetes include no blurred vision, no chest pain, no fatigue, no foot ulcerations, no polydipsia, no polyphagia, no polyuria, no visual change, no weakness and no weight loss. There are no hypoglycemic complications. Symptoms are improving. Diabetic complications include peripheral neuropathy. Risk factors for coronary artery disease include hypertension, obesity, sedentary lifestyle, dyslipidemia, family history and diabetes mellitus. Current diabetic treatment includes oral agent (monotherapy) (Trulicity ). She is compliant with treatment most of the time. She is following a generally healthy diet. Meal planning includes avoidance of concentrated sweets. She rarely participates in exercise. Home blood sugar record trend: not checking  An ACE inhibitor/angiotensin II receptor blocker is being taken. She does not see a podiatrist.Eye exam is not current.   Hypertension  This is a chronic problem. The current episode started more than 1 year ago. The problem is controlled. Associated symptoms include anxiety. Pertinent negatives include no blurred vision, chest pain, headaches, malaise/fatigue, neck pain, palpitations, peripheral edema, shortness of breath or sweats. There are no  associated agents to hypertension. Risk factors for coronary artery disease include obesity, sedentary lifestyle, dyslipidemia, diabetes mellitus and family history. Past treatments include ACE inhibitors. The current treatment provides moderate improvement. Compliance problems include exercise and diet.  Identifiable causes of hypertension include a thyroid problem. There is no history of chronic renal disease.   Hyperlipidemia  This is a chronic problem. The current episode started more than 1 year ago. The problem is controlled. Exacerbating diseases include diabetes, hypothyroidism and obesity. She has no history of chronic renal disease or liver disease. There are no known factors aggravating her hyperlipidemia. Pertinent negatives include no chest pain, leg pain, myalgias or shortness of breath. Current antihyperlipidemic treatment includes statins. The current treatment provides moderate improvement of lipids. Risk factors for coronary artery disease include a sedentary lifestyle, obesity, hypertension, diabetes mellitus, dyslipidemia and family history.   Anxiety  Presents for follow-up visit. Symptoms include excessive worry and nervous/anxious behavior (stable ). Patient reports no chest pain, confusion, dizziness, palpitations, shortness of breath or suicidal ideas. Symptoms occur occasionally. The severity of symptoms is mild. The quality of sleep is good.     Compliance with medications is %.       Past Surgical History:   Procedure Laterality Date    ADENOIDECTOMY  1985    HERNIA REPAIR  03/21/2016    INSERTION OF CONTRACEPTIVE CAPSULE  2012    TONSILLECTOMY  1985    TYMPANOSTOMY TUBE PLACEMENT  1985     Family History   Problem Relation Name Age of Onset    Hypertension Mother      Stroke Mother      Heart disease Mother      Diabetes Mother      Diabetes Father      Stroke Father      Heart disease Father      Depression Father        Social History     Socioeconomic History    Marital status:     Occupational History    Occupation: business owner   Tobacco Use    Smoking status: Never     Passive exposure: Never    Smokeless tobacco: Never   Substance and Sexual Activity    Alcohol use: No    Drug use: No     Current Outpatient Medications   Medication Sig Dispense Refill    metFORMIN (GLUCOPHAGE) 500 MG tablet TAKE TWO TABLETS BY MOUTH TWO TIMES A DAY WITH MEALS 360 tablet 0    nitroGLYCERIN (NITROSTAT) 0.4 MG SL tablet Place 0.4 mg under the tongue every 5 (five) minutes as needed for Chest pain.      atorvastatin (LIPITOR) 20 MG tablet Take 1 tablet (20 mg total) by mouth once daily. 90 tablet 1    blood sugar diagnostic Strp Use 1 strip daily to check blood sugar 100 strip 1    dapagliflozin propanediol (FARXIGA) 5 mg Tab tablet Take 1 tablet (5 mg total) by mouth once daily. 90 tablet 0    EScitalopram oxalate (LEXAPRO) 10 MG tablet Take 1 tablet (10 mg total) by mouth once daily. 90 tablet 1    fenofibrate 160 MG Tab Take 1 tablet (160 mg total) by mouth once daily. 90 tablet 1    lancing device with lancets Kit 1 Units by Misc.(Non-Drug; Combo Route) route once daily. 100 each 0    levothyroxine (SYNTHROID) 100 MCG tablet Take 1 tablet (100 mcg total) by mouth before breakfast. 90 tablet 1    lisinopriL (PRINIVIL,ZESTRIL) 20 MG tablet Take 1 tablet (20 mg total) by mouth once daily. 90 tablet 1    semaglutide (OZEMPIC) 1 mg/dose (4 mg/3 mL) Inject 1 mg into the skin every 7 days. 3 mL 1     No current facility-administered medications for this visit.     Review of patient's allergies indicates:  No Known Allergies   Past Medical History:   Diagnosis Date    Anxiety     Breast cyst     4 mm cyst according to mammo, repeat test 5/2016    Diabetes mellitus, type 2     High calcium levels     Hypertension     Hypertriglyceridemia     Hypothyroidism     Umbilical hernia     since 2009, worse after giving birth     Past Surgical History:   Procedure Laterality Date    ADENOIDECTOMY  1985     "HERNIA REPAIR  03/21/2016    INSERTION OF CONTRACEPTIVE CAPSULE  2012    TONSILLECTOMY  1985    TYMPANOSTOMY TUBE PLACEMENT  1985       Review of Systems   Constitutional:  Negative for appetite change, chills, diaphoresis, fatigue, malaise/fatigue, unexpected weight change and weight loss.   HENT:  Negative for ear discharge, hearing loss, trouble swallowing and voice change.    Eyes:  Negative for blurred vision, photophobia and pain.   Respiratory:  Negative for chest tightness, shortness of breath and stridor.    Cardiovascular:  Negative for chest pain and palpitations.   Gastrointestinal:  Negative for abdominal pain, blood in stool and vomiting.   Endocrine: Negative for cold intolerance, heat intolerance, polydipsia, polyphagia and polyuria.   Genitourinary:  Negative for difficulty urinating and flank pain.   Musculoskeletal:  Negative for joint swelling, myalgias, neck pain and neck stiffness.   Skin:  Negative for pallor.   Neurological:  Negative for dizziness, speech difficulty, weakness and headaches.   Hematological:  Does not bruise/bleed easily.   Psychiatric/Behavioral:  Negative for confusion, dysphoric mood, self-injury, sleep disturbance and suicidal ideas. The patient is nervous/anxious (stable ).       OBJECTIVE:      Vitals:    04/29/24 1258   BP: (P) 110/64   Pulse: 100   SpO2: 98%   Weight: 80.7 kg (178 lb)   Height: 5' 4" (1.626 m)     Physical Exam  Vitals and nursing note reviewed.   Constitutional:       General: She is not in acute distress.     Appearance: She is well-developed.   HENT:      Head: Normocephalic and atraumatic.      Right Ear: Tympanic membrane normal.      Left Ear: Tympanic membrane normal.      Nose: Nose normal.      Mouth/Throat:      Pharynx: Uvula midline.   Eyes:      General: Lids are normal.      Conjunctiva/sclera: Conjunctivae normal.      Pupils: Pupils are equal, round, and reactive to light.      Right eye: Pupil is round and reactive.      Left eye: " Pupil is round and reactive.   Neck:      Thyroid: No thyromegaly.      Vascular: No JVD.      Trachea: Trachea normal.   Cardiovascular:      Rate and Rhythm: Normal rate and regular rhythm.      Pulses: Normal pulses.      Heart sounds: Normal heart sounds. No murmur heard.  Pulmonary:      Effort: Pulmonary effort is normal. No tachypnea or respiratory distress.      Breath sounds: Normal breath sounds. No wheezing, rhonchi or rales.   Abdominal:      General: Bowel sounds are normal.      Palpations: Abdomen is soft.      Tenderness: There is no abdominal tenderness.   Musculoskeletal:         General: Normal range of motion.      Cervical back: Normal range of motion and neck supple.      Right lower leg: No edema.      Left lower leg: No edema.      Right foot: No deformity.      Left foot: No deformity.   Feet:      Right foot:      Protective Sensation: 10 sites tested.  10 sites sensed.      Skin integrity: No ulcer, blister or skin breakdown.      Left foot:      Protective Sensation: 10 sites tested.  10 sites sensed.      Skin integrity: No ulcer, blister or skin breakdown.   Lymphadenopathy:      Cervical: No cervical adenopathy.   Skin:     General: Skin is warm and dry.      Findings: No rash.   Neurological:      Mental Status: She is alert and oriented to person, place, and time.   Psychiatric:         Mood and Affect: Mood normal.         Speech: Speech normal.         Behavior: Behavior normal. Behavior is cooperative.         Thought Content: Thought content normal.         Judgment: Judgment normal.        Lab Visit on 04/25/2024   Component Date Value Ref Range Status    WBC 04/25/2024 10.92  3.90 - 12.70 K/uL Final    RBC 04/25/2024 4.36  4.00 - 5.40 M/uL Final    Hemoglobin 04/25/2024 13.0  12.0 - 16.0 g/dL Final    Hematocrit 04/25/2024 39.9  37.0 - 48.5 % Final    MCV 04/25/2024 92  82 - 98 fL Final    MCH 04/25/2024 29.8  27.0 - 31.0 pg Final    MCHC 04/25/2024 32.6  32.0 - 36.0 g/dL Final     RDW 04/25/2024 12.5  11.5 - 14.5 % Final    Platelets 04/25/2024 356  150 - 450 K/uL Final    MPV 04/25/2024 9.3  9.2 - 12.9 fL Final    Immature Granulocytes 04/25/2024 0.3  0.0 - 0.5 % Final    Gran # (ANC) 04/25/2024 6.5  1.8 - 7.7 K/uL Final    Immature Grans (Abs) 04/25/2024 0.03  0.00 - 0.04 K/uL Final    Comment: Mild elevation in immature granulocytes is non specific and   can be seen in a variety of conditions including stress response,   acute inflammation, trauma and pregnancy. Correlation with other   laboratory and clinical findings is essential.      Lymph # 04/25/2024 3.2  1.0 - 4.8 K/uL Final    Mono # 04/25/2024 0.7  0.3 - 1.0 K/uL Final    Eos # 04/25/2024 0.4  0.0 - 0.5 K/uL Final    Baso # 04/25/2024 0.07  0.00 - 0.20 K/uL Final    nRBC 04/25/2024 0  0 /100 WBC Final    Gran % 04/25/2024 59.4  38.0 - 73.0 % Final    Lymph % 04/25/2024 29.7  18.0 - 48.0 % Final    Mono % 04/25/2024 6.0  4.0 - 15.0 % Final    Eosinophil % 04/25/2024 4.0  0.0 - 8.0 % Final    Basophil % 04/25/2024 0.6  0.0 - 1.9 % Final    Differential Method 04/25/2024 Automated   Final    Sodium 04/25/2024 140  136 - 145 mmol/L Final    Potassium 04/25/2024 4.6  3.5 - 5.1 mmol/L Final    Chloride 04/25/2024 105  95 - 110 mmol/L Final    CO2 04/25/2024 27  23 - 29 mmol/L Final    Glucose 04/25/2024 129 (H)  70 - 110 mg/dL Final    BUN 04/25/2024 19  6 - 20 mg/dL Final    Creatinine 04/25/2024 0.8  0.5 - 1.4 mg/dL Final    Calcium 04/25/2024 9.6  8.7 - 10.5 mg/dL Final    Total Protein 04/25/2024 7.4  6.0 - 8.4 g/dL Final    Albumin 04/25/2024 4.8  3.5 - 5.2 g/dL Final    Total Bilirubin 04/25/2024 0.5  0.1 - 1.0 mg/dL Final    Comment: For infants and newborns, interpretation of results should be based  on gestational age, weight and in agreement with clinical  observations.    Premature Infant recommended reference ranges:  Up to 24 hours.............<8.0 mg/dL  Up to 48 hours............<12.0 mg/dL  3-5  days..................<15.0 mg/dL  6-29 days.................<15.0 mg/dL      Alkaline Phosphatase 04/25/2024 25 (L)  55 - 135 U/L Final    AST 04/25/2024 24  10 - 40 U/L Final    ALT 04/25/2024 25  10 - 44 U/L Final    eGFR 04/25/2024 >60.0  >60 mL/min/1.73 m^2 Final    Anion Gap 04/25/2024 8  8 - 16 mmol/L Final    Cholesterol 04/25/2024 139  120 - 199 mg/dL Final    Comment: The National Cholesterol Education Program (NCEP) has set the  following guidelines (reference ranges) for Cholesterol:  Optimal.....................<200 mg/dL  Borderline High.............200-239 mg/dL  High........................> or = 240 mg/dL      Triglycerides 04/25/2024 174 (H)  30 - 150 mg/dL Final    Comment: The National Cholesterol Education Program (NCEP) has set the  following guidelines (reference values) for triglycerides:  Normal......................<150 mg/dL  Borderline High.............150-199 mg/dL  High........................200-499 mg/dL      HDL 04/25/2024 34 (L)  40 - 75 mg/dL Final    Comment: The National Cholesterol Education Program (NCEP) has set the  following guidelines (reference values) for HDL Cholesterol:  Low...............<40 mg/dL  Optimal...........>60 mg/dL      LDL Cholesterol 04/25/2024 70.2  63.0 - 159.0 mg/dL Final    Comment: The National Cholesterol Education Program (NCEP) has set the  following guidelines (reference values) for LDL Cholesterol:  Optimal.......................<130 mg/dL  Borderline High...............130-159 mg/dL  High..........................160-189 mg/dL  Very High.....................>190 mg/dL      HDL/Cholesterol Ratio 04/25/2024 24.5  20.0 - 50.0 % Final    Total Cholesterol/HDL Ratio 04/25/2024 4.1  2.0 - 5.0 Final    Non-HDL Cholesterol 04/25/2024 105  mg/dL Final    Comment: Risk category and Non-HDL cholesterol goals:  Coronary heart disease (CHD)or equivalent (10-year risk of CHD >20%):  Non-HDL cholesterol goal     <130 mg/dL  Two or more CHD risk factors and  10-year risk of CHD <= 20%:  Non-HDL cholesterol goal     <160 mg/dL  0 to 1 CHD risk factor:  Non-HDL cholesterol goal     <190 mg/dL      Specimen UA 04/25/2024 Urine, Clean Catch   Final    Color, UA 04/25/2024 Yellow  Yellow, Straw, Mayi Final    Appearance, UA 04/25/2024 Clear  Clear Final    pH, UA 04/25/2024 5.0  5.0 - 8.0 Final    Specific Gravity, UA 04/25/2024 1.025  1.005 - 1.030 Final    Protein, UA 04/25/2024 Negative  Negative Final    Comment: Recommend a 24 hour urine protein or a urine   protein/creatinine ratio if globulin induced proteinuria is  clinically suspected.      Glucose, UA 04/25/2024 Negative  Negative Final    Ketones, UA 04/25/2024 Negative  Negative Final    Bilirubin (UA) 04/25/2024 Negative  Negative Final    Occult Blood UA 04/25/2024 Negative  Negative Final    Nitrite, UA 04/25/2024 Negative  Negative Final    Urobilinogen, UA 04/25/2024 Negative  Negative EU/dL Final    Leukocytes, UA 04/25/2024 Trace (A)  Negative Final    TSH 04/25/2024 0.357  0.340 - 5.600 uIU/mL Final    Hemoglobin A1C 04/25/2024 7.0 (H)  4.5 - 6.2 % Final    Comment: According to ADA guidelines, hemoglobin A1C <7.0% represents  optimal control in non-pregnant diabetic patients.  Different  metrics may apply to specific populations.   Standards of Medical Care in Diabetes - 2016.    For the purpose of screening for the presence of diabetes:  <5.7%     Consistent with the absence of diabetes  5.7-6.4%  Consistent with increasing risk for diabetes   (prediabetes)  >or=6.5%  Consistent with diabetes    Currently no consensus exists for use of hemoglobin A1C  for diagnosis of diabetes for children.      Estimated Avg Glucose 04/25/2024 154 (H)  68 - 131 mg/dL Final    WBC, UA 04/25/2024 4  0 - 5 /hpf Final    Bacteria 04/25/2024 Rare  None-Occ /hpf Final    Squam Epithel, UA 04/25/2024 10  /hpf Final    Microscopic Comment 04/25/2024 SEE COMMENT   Final    Comment: Other formed elements not mentioned in the  report are not   present in the microscopic examination.      ]  Assessment:       1. Type 2 diabetes mellitus with hyperglycemia, without long-term current use of insulin    2. Benign hypertension    3. Mixed hyperlipidemia    4. Hypertriglyceridemia    5. Subclinical hypothyroidism    6. Anxiety        Plan:       Type 2 diabetes mellitus with hyperglycemia, without long-term current use of insulin  -    start semaglutide (OZEMPIC) 1 mg/dose (4 mg/3 mL); Inject 1 mg into the skin every 7 days.  Dispense: 3 mL; Refill: 1  -     Foot Exam Performed  -   start  dapagliflozin propanediol (FARXIGA) 5 mg Tab tablet; Take 1 tablet (5 mg total) by mouth once daily.  Dispense: 90 tablet; Refill: 0  Cont metformin    Benign hypertension  -     lisinopriL (PRINIVIL,ZESTRIL) 20 MG tablet; Take 1 tablet (20 mg total) by mouth once daily.  Dispense: 90 tablet; Refill: 1    Mixed hyperlipidemia  -     atorvastatin (LIPITOR) 20 MG tablet; Take 1 tablet (20 mg total) by mouth once daily.  Dispense: 90 tablet; Refill: 1    Hypertriglyceridemia  -     fenofibrate 160 MG Tab; Take 1 tablet (160 mg total) by mouth once daily.  Dispense: 90 tablet; Refill: 1    Subclinical hypothyroidism  -     levothyroxine (SYNTHROID) 100 MCG tablet; Take 1 tablet (100 mcg total) by mouth before breakfast.  Dispense: 90 tablet; Refill: 1    Anxiety  -     EScitalopram oxalate (LEXAPRO) 10 MG tablet; Take 1 tablet (10 mg total) by mouth once daily.  Dispense: 90 tablet; Refill: 1        Follow up in about 4 months (around 8/29/2024) for DM .      4/29/2024 Diego Chiang, JOSE, FNP

## 2024-04-29 ENCOUNTER — OFFICE VISIT (OUTPATIENT)
Dept: FAMILY MEDICINE | Facility: CLINIC | Age: 49
End: 2024-04-29
Payer: MEDICAID

## 2024-04-29 VITALS — BODY MASS INDEX: 30.39 KG/M2 | HEART RATE: 100 BPM | OXYGEN SATURATION: 98 % | WEIGHT: 178 LBS | HEIGHT: 64 IN

## 2024-04-29 DIAGNOSIS — E78.2 MIXED HYPERLIPIDEMIA: ICD-10-CM

## 2024-04-29 DIAGNOSIS — E11.65 TYPE 2 DIABETES MELLITUS WITH HYPERGLYCEMIA, WITHOUT LONG-TERM CURRENT USE OF INSULIN: Primary | ICD-10-CM

## 2024-04-29 DIAGNOSIS — E78.1 HYPERTRIGLYCERIDEMIA: ICD-10-CM

## 2024-04-29 DIAGNOSIS — F41.9 ANXIETY: ICD-10-CM

## 2024-04-29 DIAGNOSIS — I10 BENIGN HYPERTENSION: ICD-10-CM

## 2024-04-29 DIAGNOSIS — E03.8 SUBCLINICAL HYPOTHYROIDISM: ICD-10-CM

## 2024-04-29 PROCEDURE — 3066F NEPHROPATHY DOC TX: CPT | Mod: CPTII,S$GLB,, | Performed by: NURSE PRACTITIONER

## 2024-04-29 PROCEDURE — 3051F HG A1C>EQUAL 7.0%<8.0%: CPT | Mod: CPTII,S$GLB,, | Performed by: NURSE PRACTITIONER

## 2024-04-29 PROCEDURE — 3060F POS MICROALBUMINURIA REV: CPT | Mod: CPTII,S$GLB,, | Performed by: NURSE PRACTITIONER

## 2024-04-29 PROCEDURE — 4010F ACE/ARB THERAPY RXD/TAKEN: CPT | Mod: CPTII,S$GLB,, | Performed by: NURSE PRACTITIONER

## 2024-04-29 PROCEDURE — 1160F RVW MEDS BY RX/DR IN RCRD: CPT | Mod: CPTII,S$GLB,, | Performed by: NURSE PRACTITIONER

## 2024-04-29 PROCEDURE — 1159F MED LIST DOCD IN RCRD: CPT | Mod: CPTII,S$GLB,, | Performed by: NURSE PRACTITIONER

## 2024-04-29 PROCEDURE — 3008F BODY MASS INDEX DOCD: CPT | Mod: CPTII,S$GLB,, | Performed by: NURSE PRACTITIONER

## 2024-04-29 PROCEDURE — 99214 OFFICE O/P EST MOD 30 MIN: CPT | Mod: S$GLB,,, | Performed by: NURSE PRACTITIONER

## 2024-04-29 RX ORDER — SEMAGLUTIDE 1.34 MG/ML
1 INJECTION, SOLUTION SUBCUTANEOUS
Qty: 3 ML | Refills: 1 | Status: SHIPPED | OUTPATIENT
Start: 2024-04-29

## 2024-04-29 RX ORDER — ATORVASTATIN CALCIUM 20 MG/1
20 TABLET, FILM COATED ORAL DAILY
Qty: 90 TABLET | Refills: 1 | Status: SHIPPED | OUTPATIENT
Start: 2024-04-29

## 2024-04-29 RX ORDER — LISINOPRIL 20 MG/1
20 TABLET ORAL DAILY
Qty: 90 TABLET | Refills: 1 | Status: SHIPPED | OUTPATIENT
Start: 2024-04-29

## 2024-04-29 RX ORDER — FENOFIBRATE 160 MG/1
160 TABLET ORAL DAILY
Qty: 90 TABLET | Refills: 1 | Status: SHIPPED | OUTPATIENT
Start: 2024-04-29

## 2024-04-29 RX ORDER — DAPAGLIFLOZIN 5 MG/1
5 TABLET, FILM COATED ORAL DAILY
Qty: 90 TABLET | Refills: 0 | Status: SHIPPED | OUTPATIENT
Start: 2024-04-29

## 2024-04-29 RX ORDER — LEVOTHYROXINE SODIUM 100 UG/1
100 TABLET ORAL
Qty: 90 TABLET | Refills: 1 | Status: SHIPPED | OUTPATIENT
Start: 2024-04-29

## 2024-04-29 RX ORDER — ESCITALOPRAM OXALATE 10 MG/1
10 TABLET ORAL DAILY
Qty: 90 TABLET | Refills: 1 | Status: SHIPPED | OUTPATIENT
Start: 2024-04-29

## 2024-06-26 ENCOUNTER — PATIENT MESSAGE (OUTPATIENT)
Dept: FAMILY MEDICINE | Facility: CLINIC | Age: 49
End: 2024-06-26
Payer: MEDICAID

## 2024-06-26 DIAGNOSIS — E11.65 TYPE 2 DIABETES MELLITUS WITH HYPERGLYCEMIA, WITHOUT LONG-TERM CURRENT USE OF INSULIN: Primary | ICD-10-CM

## 2024-06-26 DIAGNOSIS — M25.561 ACUTE PAIN OF RIGHT KNEE: ICD-10-CM

## 2024-06-27 RX ORDER — SEMAGLUTIDE 2.68 MG/ML
2 INJECTION, SOLUTION SUBCUTANEOUS
Qty: 3 ML | Refills: 2 | Status: SHIPPED | OUTPATIENT
Start: 2024-06-27

## 2024-07-17 ENCOUNTER — OFFICE VISIT (OUTPATIENT)
Dept: ORTHOPEDICS | Facility: CLINIC | Age: 49
End: 2024-07-17
Payer: MEDICAID

## 2024-07-17 ENCOUNTER — HOSPITAL ENCOUNTER (OUTPATIENT)
Dept: RADIOLOGY | Facility: HOSPITAL | Age: 49
Discharge: HOME | End: 2024-07-17
Attending: PHYSICIAN ASSISTANT
Payer: MEDICAID

## 2024-07-17 VITALS — HEIGHT: 64 IN | WEIGHT: 177.94 LBS | BODY MASS INDEX: 30.38 KG/M2

## 2024-07-17 DIAGNOSIS — M17.11 PRIMARY OSTEOARTHRITIS OF RIGHT KNEE: Primary | ICD-10-CM

## 2024-07-17 DIAGNOSIS — M23.203 DEGENERATIVE TEAR OF MEDIAL MENISCUS OF RIGHT KNEE: ICD-10-CM

## 2024-07-17 PROCEDURE — 99214 OFFICE O/P EST MOD 30 MIN: CPT | Mod: PBBFAC,25,PN | Performed by: PHYSICIAN ASSISTANT

## 2024-07-17 PROCEDURE — 73562 X-RAY EXAM OF KNEE 3: CPT | Mod: 26,RT,, | Performed by: RADIOLOGY

## 2024-07-17 PROCEDURE — 73562 X-RAY EXAM OF KNEE 3: CPT | Mod: TC,PN,RT

## 2024-07-17 PROCEDURE — 99999PBSHW PR PBB SHADOW TECHNICAL ONLY FILED TO HB: Mod: PBBFAC,,,

## 2024-07-17 PROCEDURE — 3060F POS MICROALBUMINURIA REV: CPT | Mod: CPTII,,, | Performed by: PHYSICIAN ASSISTANT

## 2024-07-17 PROCEDURE — 3066F NEPHROPATHY DOC TX: CPT | Mod: CPTII,,, | Performed by: PHYSICIAN ASSISTANT

## 2024-07-17 PROCEDURE — 1159F MED LIST DOCD IN RCRD: CPT | Mod: CPTII,,, | Performed by: PHYSICIAN ASSISTANT

## 2024-07-17 PROCEDURE — 99999 PR PBB SHADOW E&M-EST. PATIENT-LVL IV: CPT | Mod: PBBFAC,,, | Performed by: PHYSICIAN ASSISTANT

## 2024-07-17 PROCEDURE — 99203 OFFICE O/P NEW LOW 30 MIN: CPT | Mod: S$PBB,25,, | Performed by: PHYSICIAN ASSISTANT

## 2024-07-17 PROCEDURE — 4010F ACE/ARB THERAPY RXD/TAKEN: CPT | Mod: CPTII,,, | Performed by: PHYSICIAN ASSISTANT

## 2024-07-17 PROCEDURE — 3008F BODY MASS INDEX DOCD: CPT | Mod: CPTII,,, | Performed by: PHYSICIAN ASSISTANT

## 2024-07-17 PROCEDURE — 3051F HG A1C>EQUAL 7.0%<8.0%: CPT | Mod: CPTII,,, | Performed by: PHYSICIAN ASSISTANT

## 2024-07-17 PROCEDURE — 20610 DRAIN/INJ JOINT/BURSA W/O US: CPT | Mod: PBBFAC,PN,RT | Performed by: PHYSICIAN ASSISTANT

## 2024-07-17 PROCEDURE — 20610 DRAIN/INJ JOINT/BURSA W/O US: CPT | Mod: S$PBB,RT,, | Performed by: PHYSICIAN ASSISTANT

## 2024-07-17 RX ORDER — TRIAMCINOLONE ACETONIDE 40 MG/ML
40 INJECTION, SUSPENSION INTRA-ARTICULAR; INTRAMUSCULAR
Status: DISCONTINUED | OUTPATIENT
Start: 2024-07-17 | End: 2024-07-17 | Stop reason: HOSPADM

## 2024-07-17 RX ADMIN — TRIAMCINOLONE ACETONIDE 40 MG: 40 INJECTION, SUSPENSION INTRA-ARTICULAR; INTRAMUSCULAR at 01:07

## 2024-07-17 NOTE — PROGRESS NOTES
[facility]  [address]  [place] [address]  Phone: [phone]   [phone]    Patient's PCP: Diego Chiang NP  Referring Provider: Diego Chiang    Subjective:      Chief Complaint:   Chief Complaint   Patient presents with    Right Knee - Pain     Patient is here with complaints of right knee pain x 4 years, had a fall 1 year ago and has progressively started to get worse. Has burning and shooting pain        Past Medical History:   Diagnosis Date    Anxiety     Breast cyst     4 mm cyst according to mammo, repeat test 5/2016    Diabetes mellitus, type 2     High calcium levels     Hypertension     Hypertriglyceridemia     Hypothyroidism     Umbilical hernia     since 2009, worse after giving birth       Past Surgical History:   Procedure Laterality Date    ADENOIDECTOMY  1985    HERNIA REPAIR  03/21/2016    INSERTION OF CONTRACEPTIVE CAPSULE  2012    TONSILLECTOMY  1985    TYMPANOSTOMY TUBE PLACEMENT  1985       Current Outpatient Medications   Medication Sig    atorvastatin (LIPITOR) 20 MG tablet Take 1 tablet (20 mg total) by mouth once daily.    blood sugar diagnostic Strp Use 1 strip daily to check blood sugar    dapagliflozin propanediol (FARXIGA) 5 mg Tab tablet Take 1 tablet (5 mg total) by mouth once daily.    EScitalopram oxalate (LEXAPRO) 10 MG tablet Take 1 tablet (10 mg total) by mouth once daily.    fenofibrate 160 MG Tab Take 1 tablet (160 mg total) by mouth once daily.    lancing device with lancets Kit 1 Units by Misc.(Non-Drug; Combo Route) route once daily.    levothyroxine (SYNTHROID) 100 MCG tablet Take 1 tablet (100 mcg total) by mouth before breakfast.    lisinopriL (PRINIVIL,ZESTRIL) 20 MG tablet Take 1 tablet (20 mg total) by mouth once daily.    metFORMIN (GLUCOPHAGE) 500 MG tablet TAKE TWO TABLETS BY MOUTH TWO TIMES A DAY WITH MEALS    nitroGLYCERIN (NITROSTAT) 0.4 MG SL tablet Place 0.4 mg under the tongue every 5 (five) minutes as needed for Chest  pain.    semaglutide (OZEMPIC) 2 mg/dose (8 mg/3 mL) PnIj Inject 2 mg into the skin every 7 days.     No current facility-administered medications for this visit.       Review of patient's allergies indicates:  No Known Allergies    Family History   Problem Relation Name Age of Onset    Hypertension Mother      Stroke Mother      Heart disease Mother      Diabetes Mother      Diabetes Father      Stroke Father      Heart disease Father      Depression Father         Social History     Socioeconomic History    Marital status:    Occupational History    Occupation: business owner   Tobacco Use    Smoking status: Never     Passive exposure: Never    Smokeless tobacco: Never   Substance and Sexual Activity    Alcohol use: No    Drug use: No       Prior to meeting with the patient I reviewed the medical chart in [facility]. This included reviewing the previous progress notes from our office, review of the patient's last appointment with their primary care provider, review of any visits to the emergency room, and review of any pain management appointments or procedures.    History of present illness: 49 y.o. female,  presents to clinic today for evaluation of complaints of right knee pain.  She has had right knee pain off and on for about 4 years maybe even longer.  Just kind of waxed and waned but most recently it has become more persistent or unrelenting.  Over-the-counter treatments conservative measures medications are helping.  She denies any instability however.  No locking no catching no sticking no giving way.       Review of Systems:    Constitutional: Negative for chills, fever and weight loss.   HENT: Negative for congestion.    Eyes: Negative for discharge and redness.   Respiratory: Negative for cough and shortness of breath.    Cardiovascular: Negative for chest pain.   Gastrointestinal: Negative for nausea and vomiting.   Musculoskeletal: See HPI.   Skin: Negative for rash.   Neurological: Negative for  headaches.   Endo/Heme/Allergies: Does not bruise/bleed easily.   Psychiatric/Behavioral: The patient is not nervous/anxious.    All other systems reviewed and are negative.       Objective:      Physical Examination:    Vital Signs:  There were no vitals filed for this visit.    Body mass index is 30.54 kg/m².    This a well-developed, well nourished patient in no acute distress.  They are alert and oriented and cooperative to examination.     Examination of the right knee, skin is dry and intact, no erythema or ecchymosis, no signs symptoms of infection, no effusion.  Range of motion 0-120 degrees.  Stable to anterior-posterior varus and valgus stress.  She was tender over the lateral joint line, no pain with Papito's testing however.  Calf soft nontender, straight leg raise negative.      Pertinent New Results:          XRAY Report / Interpretation:   AP lateral sunrise views of the right knee taken today in the office demonstrate degenerative changes with lateral tilt to the patellas bilaterally medial compartment narrowing worse on the left than the right.          Assessment:       1. Primary osteoarthritis of right knee    2. Degenerative tear of medial meniscus of right knee      Plan:     Primary osteoarthritis of right knee  -     X-Ray Knee 3 View Right  -     Ambulatory referral/consult to Orthopedics  -     Large Joint Aspiration/Injection: R knee  -     Ambulatory referral/consult to Physical/Occupational Therapy; Future; Expected date: 07/24/2024    Degenerative tear of medial meniscus of right knee  -     Ambulatory referral/consult to Physical/Occupational Therapy; Future; Expected date: 07/24/2024        Follow up for 6 wk f/u right knee inj and PT f/u.    Patient with degenerative changes on x-ray slightly worse on the left than the right but she is asymptomatic on the left.  We injected the right knee today, anterior lateral approach sterile technique lidocaine and triamcinolone.  We will do  some physical therapy, will check her back in 6 weeks.  If she is not better consider an MRI possibly for arthroscopy if she has meniscal pathology.  Ultimately I think the underlying arthritis is more severe than the x-rays shows and we briefly discussed total knee arthroplasty.      [unfilled]  SOPHIE Gonzales, PAQuynhC        This note was created using [employer] voice recognition software that occasionally misinterprets words or phrases.

## 2024-07-17 NOTE — PROCEDURES
Large Joint Aspiration/Injection: R knee    Date/Time: 7/17/2024 1:30 PM    Performed by: Zachariah Pagan PA  Authorized by: Zachariah Pagan PA    Consent Done?:  Yes (Verbal)  Indications:  Arthritis and pain  Site marked: the procedure site was marked    Timeout: prior to procedure the correct patient, procedure, and site was verified    Prep: patient was prepped and draped in usual sterile fashion      Local anesthesia used?: Yes    Local anesthetic:  Lidocaine 1% without epinephrine    Details:  Needle Size:  25 G  Ultrasonic Guidance for needle placement?: No    Location:  Knee  Site:  R knee  Medications:  40 mg triamcinolone acetonide 40 mg/mL  Patient tolerance:  Patient tolerated the procedure well with no immediate complications

## 2024-07-29 ENCOUNTER — CLINICAL SUPPORT (OUTPATIENT)
Dept: REHABILITATION | Facility: HOSPITAL | Age: 49
End: 2024-07-29
Payer: MEDICAID

## 2024-07-29 DIAGNOSIS — M17.11 PRIMARY OSTEOARTHRITIS OF RIGHT KNEE: ICD-10-CM

## 2024-07-29 DIAGNOSIS — M23.203 DEGENERATIVE TEAR OF MEDIAL MENISCUS OF RIGHT KNEE: ICD-10-CM

## 2024-07-29 DIAGNOSIS — R29.898 WEAKNESS OF RIGHT LOWER EXTREMITY: Primary | ICD-10-CM

## 2024-07-29 DIAGNOSIS — R68.89 DIFFICULTY MAINTAINING SQUATTING POSITION: ICD-10-CM

## 2024-07-29 PROCEDURE — 97530 THERAPEUTIC ACTIVITIES: CPT | Mod: PN

## 2024-07-29 PROCEDURE — 97162 PT EVAL MOD COMPLEX 30 MIN: CPT | Mod: PN

## 2024-07-31 ENCOUNTER — CLINICAL SUPPORT (OUTPATIENT)
Dept: REHABILITATION | Facility: HOSPITAL | Age: 49
End: 2024-07-31
Payer: MEDICAID

## 2024-07-31 DIAGNOSIS — R29.898 WEAKNESS OF RIGHT LOWER EXTREMITY: Primary | ICD-10-CM

## 2024-07-31 DIAGNOSIS — R68.89 DIFFICULTY MAINTAINING SQUATTING POSITION: ICD-10-CM

## 2024-07-31 PROCEDURE — 97110 THERAPEUTIC EXERCISES: CPT | Mod: PN,CQ

## 2024-07-31 NOTE — PROGRESS NOTES
[facility]   Physical Therapy Treatment Note      Name: Tsering Carlos  Clinic Number: [id number]    Therapy Diagnosis:   Encounter Diagnoses   Name Primary?    Weakness of right lower extremity Yes    Difficulty maintaining squatting position      Physician: Zachariah Pagan PA    Visit Date: 7/31/2024      Physician Orders: PT Eval and Treat   Medical Diagnosis from Referral:   M17.11 (ICD-10-CM) - Primary osteoarthritis of right knee   M23.203 (ICD-10-CM) - Degenerative tear of medial meniscus of right knee      Evaluation Date: 7/29/2024  Authorization Period Expiration: 07/17/2025  Plan of Care Expiration: 09/17/2025  Progress Note Due: 08/17/2024  Visit # / Visits authorized: 1/ 20  FOTO: 1/3     Precautions: Standard, Diabetes, HTN      Time In: 1403  Time Out: 1458  Total Appointment Time (timed & untimed codes): 53 minutes       PTA Visit #: 1/5       Subjective     Patient reports: No issues with Home Exercise Program. More soreness vs pain since injection 2 weeks ago.   She was compliant with home exercise program.  Response to previous treatment: first visit post initial evaluation   Functional change: first visit post initial evaluation     Pain: 1/10  Location: right knee      Objective      Objective Measures updated at progress report unless specified.     Treatment     Tsering received the treatments listed below:      therapeutic exercises to develop strength, endurance, ROM, and flexibility for 53 minutes including:    Straight leg raises 3 x 10 repetitions   Bridges 3 x 10 repetitions   Clams red theraband 3 x 10 repetitions   Pretzels 3 x 10 repetitions   Lateral stepping with red theraband 3 x 10 yards   Shuttle double leg 3 x 10 repetitions    Single leg 3 x 10 repetitions   Hamstring curls 30 pounds 3 x 10 repetitions   Precore hip abduction 40 pounds 3 x 10 repetitions       Patient Education and Home Exercises       Education provided:   - Home Exercise  Program     Written Home Exercises Provided: Yes. Exercises were reviewed and Tsering was able to demonstrate them prior to the end of the session.  Tsering demonstrated good  understanding of the education provided. See Electronic Medical Record under Patient Instructions for exercises provided during therapy sessions    Assessment     Tsering noted with good tolerance to treatment. Treatment focused on improving hip and knee strength to improve tolerance to dynamic activities. Will progress as able.     Tsering Is progressing well towards her goals.   Patient prognosis is Good.     Patient will continue to benefit from skilled outpatient physical therapy to address the deficits listed in the problem list box on initial evaluation, provide pt/family education and to maximize pt's level of independence in the home and community environment.     Patient's spiritual, cultural and educational needs considered and pt agreeable to plan of care and goals.     Anticipated barriers to physical therapy: chronicity of condition    Goals:     Short Term Goals: 4 weeks   Pt will be IND with initial HEP to manage symptoms outside of PT.   Pt will report Right knee pain improved by >/= 50% with squatting to demonstrate improved condition.   Pt will improve MMT of lower extremity strength deficits by >/= 1/5 to improve tolerance for progressing rehab.      Long Term Goals: 6-8 weeks   Pt will improve FOTO score to >/= 53 to demonstrate improved functional mobility.  Pt will be IND with final HEP to maintain/improve strength and mobility gained in PT.   Pt will report Right knee pain improved by >/= 100% with dancing activities to demonstrate improved condition.   Pt will improve MMT of lower extremity strength deficits to >/= 4+/ 5 to improve tolerance for lifting/carrying activities.   Pt goal:  Pt will report confidence in managing her condition upon discharge from PT.     Plan     Progress as per [place] Gabriel  Joanne, PTA

## 2024-07-31 NOTE — PLAN OF CARE
[facility]   Physical Therapy Initial Evaluation      Name: Tsering Carlos  Clinic Number: [id number]    Therapy Diagnosis:   Encounter Diagnoses   Name Primary?    Primary osteoarthritis of right knee     Degenerative tear of medial meniscus of right knee     Weakness of right lower extremity Yes    Difficulty maintaining squatting position         Physician: Zachariah Pagan PA    Physician Orders: PT Eval and Treat   Medical Diagnosis from Referral:   M17.11 (ICD-10-CM) - Primary osteoarthritis of right knee   M23.203 (ICD-10-CM) - Degenerative tear of medial meniscus of right knee     Evaluation Date: 7/29/2024  Authorization Period Expiration: 07/17/2025  Plan of Care Expiration: 09/17/2025  Progress Note Due: 08/17/2024  Visit # / Visits authorized: 1/ 1   FOTO: 1/3    Precautions: Standard, Diabetes, HTN     Time In: 1400  Time Out: 1440  Total Appointment Time (timed & untimed codes): 40 minutes    Subjective     Date of onset: 4 years ago     History of current condition - Tsering reports: she began having Right knee pain around 4 years ago when she fell at a skating rink with her kids. She never got anything done about it and it eventually stopped hurting. Then around 1 year ago she fell again directly onto her Right anterior knee when coming out of the dentist office. Since then she has had pain with squatting, crossing her legs, prolonged sitting, and ascending/descending stairs. 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 She finally scheduled a doctor's appt and after receiving x-rays they said she has arthritis, gave her an injection, and sent her to PT. The injection has helped significantly she is able to do a lot that she was not able to, but she knows it will wear off and she has to do PT.     Falls: not since 1 year ago     Imaging: Bilateral knees: x-ray 07/17/2024    FINDINGS:  No acute fracture.  There is  lateral patellar tilt on the right and no malalignment on the left.  There is degenerative change in each knee medial and patellofemoral compartment, most notable in the left medial compartment.  Trace right knee joint effusion.    Prior Therapy: no  Social History:  lives with their family  Occupation: dance instructor  Prior Level of Function: chronic Right knee pain  Current Level of Function: Right anterior/lateral knee pain     Pain:  Current 0/10, worst 8/10, best 0/10   Location: right anterior/lateral knee pain    Description: Aching, Sharp, and stabbing  Aggravating Factors: squatting, prolonged sitting, stairs   Easing Factors: rest    Patients goals: to improve her strength and mobility and limit her knee pain from coming back      Medical History:   Past Medical History:   Diagnosis Date    Anxiety     Breast cyst     4 mm cyst according to mammo, repeat test 5/2016    Diabetes mellitus, type 2     High calcium levels     Hypertension     Hypertriglyceridemia     Hypothyroidism     Umbilical hernia     since 2009, worse after giving birth       Surgical History:   Tsering Carlos  has a past surgical history that includes Tonsillectomy (1985); Hernia repair (03/21/2016); Adenoidectomy (1985); Tympanostomy tube placement (1985); and Insertion of contraceptive capsule (2012).    Medications:   Tsering has a current medication list which includes the following prescription(s): atorvastatin, blood sugar diagnostic, dapagliflozin propanediol, escitalopram oxalate, fenofibrate, lancing device with lancets, levothyroxine, lisinopril, metformin, nitroglycerin, and ozempic.    Allergies:   Review of patient's allergies indicates:  No Known Allergies     Objective      Posture: stands in hip External rotation     Functional tests:   DL squat: quad dominant Right anterior knee pain  SLS EO: 15s on Right, 30s on Left     Range of Motion (Passive):   Knee  Comments   Left 0/0/135    RIght 0/0/135      Range of  Motion (Active):   Knee  Comments   Left 0/0/135    Right 0/0/135        Lower Extremity Strength  Right LE  Left LE    Quadriceps: 5/5 Quadriceps: 5/5   Hamstrings: 5/5 Hamstrings: 5/5   Hip flexion (supine): 5/5 Hip flexion (supine): 5/5   Hip extension:  4-/5 Hip extension: 4/5   Hip Abduction: 4-/5 Hip Abduction: 4/5   Hip ER:  4-/5 Hip ER:  5/5   Hip IR: 5/5 Hip IR: 5/5     Special Tests:   Right Left   Valgus Stress Test negative negative   Varus Stress test negative negative   Lachman's test negative negative   Josh's Test negative negative     Joint Mobility: good       Edema: none noted               Treatment     Total Treatment time (time-based codes) separate from Evaluation: 10 minutes     Tsering received the treatments listed below:      therapeutic activities to improve functional performance for 10  minutes, including:    PT educated pt on condition, prognosis, and plan of care.     PT educated pt on and provided pt HEP consisting of:      Bridges x10   Clamshells with Red Theraband x10   Single Leg balance x30s       Patient Education and Home Exercises     Education provided:   - continue Home Exercise Program     Written Home Exercises Provided: yes. Exercises were reviewed and Tsering was able to demonstrate them prior to the end of the session.  Tsering demonstrated good  understanding of the education provided. See EMR under Patient Instructions for exercises provided during therapy sessions.    Assessment     Tsering is a 49 y.o. female referred to outpatient Physical Therapy with a medical diagnosis of   M17.11 (ICD-10-CM) - Primary osteoarthritis of right knee   M23.203 (ICD-10-CM) - Degenerative tear of medial meniscus of right knee   . Patient presents with complaints of anterior knee pain and demonstrates quad dominant squat movement pattern, impaired single leg balance, and bilateral hip weakness. Her symptoms and deficits are limiting her ability to perform household chores,  lifting/carrying activities, squatting, and recreation/leisure activities.     Patient prognosis is Good.   Patient will benefit from skilled outpatient Physical Therapy to address the deficits stated above and in the chart below, provide patient /family education, and to maximize patientt's level of independence.     Plan of care discussed with patient: Yes  Patient's spiritual, cultural and educational needs considered and patient is agreeable to the plan of care and goals as stated below:     Anticipated Barriers for therapy: chronicity of condition     Medical Necessity is demonstrated by the following  History  Co-morbidities and personal factors that may impact the plan of care [] LOW: no personal factors / co-morbidities  [x] MODERATE: 1-2 personal factors / co-morbidities  [] HIGH: 3+ personal factors / co-morbidities    Moderate / High Support Documentation:   Co-morbidities affecting plan of care:   Anxiety    Breast cyst    4 mm cyst according to mammo, repeat test 5/2016   Diabetes mellitus, type 2    High calcium levels    Hypertension    Hypertriglyceridemia    Hypothyroidism    Umbilical hernia    since 2009, worse after giving birth       Personal Factors:   no deficits     Examination  Body Structures and Functions, activity limitations and participation restrictions that may impact the plan of care [] LOW: addressing 1-2 elements  [x] MODERATE: 3+ elements  [] HIGH: 4+ elements (please support below)    Moderate / High Support Documentation: household chores, lifting/carrying activities, squatting, and recreation/leisure activities     Clinical Presentation [] LOW: stable  [x] MODERATE: Evolving  [] HIGH: Unstable     Decision Making/ Complexity Score: moderate       Goals:  Short Term Goals: 4 weeks   Pt will be IND with initial HEP to manage symptoms outside of PT.   Pt will report Right knee pain improved by >/= 50% with squatting to demonstrate improved condition.   Pt will improve MMT of lower  extremity strength deficits by >/= 1/5 to improve tolerance for progressing rehab.     Long Term Goals: 6-8 weeks   Pt will improve FOTO score to >/= 53 to demonstrate improved functional mobility.  Pt will be IND with final HEP to maintain/improve strength and mobility gained in PT.   Pt will report Right knee pain improved by >/= 100% with dancing activities to demonstrate improved condition.   Pt will improve MMT of lower extremity strength deficits to >/= 4+/ 5 to improve tolerance for lifting/carrying activities.   Pt goal:  Pt will report confidence in managing her condition upon discharge from PT.   Plan     Plan of care Certification: 7/29/2024 to 09/30/2024.    Outpatient Physical Therapy 1-2 times weekly for 8 weeks to include the following interventions: Manual Therapy, Moist Heat/ Ice, Neuromuscular Re-ed, Patient Education, Therapeutic Activities, and Therapeutic Exercise.     Garry Schumacher, PT, DPT   Board Certified Clinical Specialist in Orthopedic Physical Therapy  Board Certified Clinical Specialist in Sports Physical Therapy

## 2024-08-05 ENCOUNTER — CLINICAL SUPPORT (OUTPATIENT)
Dept: REHABILITATION | Facility: HOSPITAL | Age: 49
End: 2024-08-05
Payer: MEDICAID

## 2024-08-05 DIAGNOSIS — R29.898 WEAKNESS OF RIGHT LOWER EXTREMITY: Primary | ICD-10-CM

## 2024-08-05 DIAGNOSIS — R68.89 DIFFICULTY MAINTAINING SQUATTING POSITION: ICD-10-CM

## 2024-08-05 PROCEDURE — 97110 THERAPEUTIC EXERCISES: CPT | Mod: PN,CQ

## 2024-08-12 ENCOUNTER — CLINICAL SUPPORT (OUTPATIENT)
Dept: REHABILITATION | Facility: HOSPITAL | Age: 49
End: 2024-08-12
Payer: MEDICAID

## 2024-08-12 DIAGNOSIS — R68.89 DIFFICULTY MAINTAINING SQUATTING POSITION: ICD-10-CM

## 2024-08-12 DIAGNOSIS — R29.898 WEAKNESS OF RIGHT LOWER EXTREMITY: Primary | ICD-10-CM

## 2024-08-12 PROCEDURE — 97110 THERAPEUTIC EXERCISES: CPT | Mod: PN,CQ

## 2024-08-12 NOTE — PROGRESS NOTES
[facility]   Physical Therapy Treatment Note      Name: Tsering Carlos  Clinic Number: [id number]    Therapy Diagnosis:   Encounter Diagnoses   Name Primary?    Weakness of right lower extremity Yes    Difficulty maintaining squatting position      Physician: Zachariah Pagan PA    Visit Date: 8/12/2024      Physician Orders: PT Eval and Treat   Medical Diagnosis from Referral:   M17.11 (ICD-10-CM) - Primary osteoarthritis of right knee   M23.203 (ICD-10-CM) - Degenerative tear of medial meniscus of right knee      Evaluation Date: 7/29/2024  Authorization Period Expiration: 07/17/2025  Plan of Care Expiration: 09/17/2025  Progress Note Due: 08/17/2024  Visit # / Visits authorized: 3/ 20  FOTO: 1/3     Precautions: Standard, Diabetes, HTN      Time In: 1300  Time Out: 1400  Total Appointment Time (timed & untimed codes): 54 minutes  Physical Therapy technician assisted with treatment under direct supervision of treating therapist as needed.    PTA Visit #: 3/5       Subjective     Patient reports: Right knee is feeling great. Her left lateral knee has started bothering her. Reports she wakes up with it sore in the morning and it will lead to soreness in her anterior hip.     She was compliant with home exercise program.  Response to previous treatment: as noted  Functional change: as noted     Pain: 1/10  Location: right knee      Objective      Objective Measures updated at progress report unless specified.     Treatment     Tsering received the treatments listed below:      therapeutic exercises to develop strength, endurance, ROM, and flexibility for 54 minutes including:    Upright bike x 8 minutes   Straight leg raises 3 x 10 repetitions   Bridges 3 x 10 repetitions   Clams red theraband 3 x 10 repetitions   Pretzels 3 x 10 repetitions   Lateral stepping with red theraband 3 x 10 yards   Shuttle double leg 3 x 10 repetitions 50 pounds    Single leg 3 x 10 repetitions 25  pounds   Hamstring curls 30 pounds 3 x 10 repetitions   Precore hip abduction 40 pounds 3 x 10 repetitions   Knee extension 10 pounds 3 x 10 repetitions   Due to symptoms being mostly in the morning discussed sleeping posture and strategies to improve lateral knee pain.       Patient Education and Home Exercises       Education provided:   - Home Exercise Program     Written Home Exercises Provided: Yes. Exercises were reviewed and Tsering was able to demonstrate them prior to the end of the session.  Tsering demonstrated good  understanding of the education provided. See Electronic Medical Record under Patient Instructions for exercises provided during therapy sessions    Assessment     Tsering noted with good tolerance to treatment. She verbalized her left knee felt much better post and understanding of education. She was able to progress strengthening as indicated above. Will continue to progress as able.     Tsering Is progressing well towards her goals.   Patient prognosis is Good.     Patient will continue to benefit from skilled outpatient physical therapy to address the deficits listed in the problem list box on initial evaluation, provide pt/family education and to maximize pt's level of independence in the home and community environment.     Patient's spiritual, cultural and educational needs considered and pt agreeable to plan of care and goals.     Anticipated barriers to physical therapy: chronicity of condition    Goals:     Short Term Goals: 4 weeks   Pt will be IND with initial HEP to manage symptoms outside of PT.   Pt will report Right knee pain improved by >/= 50% with squatting to demonstrate improved condition.   Pt will improve MMT of lower extremity strength deficits by >/= 1/5 to improve tolerance for progressing rehab.      Long Term Goals: 6-8 weeks   Pt will improve FOTO score to >/= 53 to demonstrate improved functional mobility.  Pt will be IND with final HEP to maintain/improve  strength and mobility gained in PT.   Pt will report Right knee pain improved by >/= 100% with dancing activities to demonstrate improved condition.   Pt will improve MMT of lower extremity strength deficits to >/= 4+/ 5 to improve tolerance for lifting/carrying activities.   Pt goal:  Pt will report confidence in managing her condition upon discharge from PT.     Plan     Progress as per POC     Nery Rubio, PTA

## 2024-08-15 ENCOUNTER — TELEPHONE (OUTPATIENT)
Dept: FAMILY MEDICINE | Facility: CLINIC | Age: 49
End: 2024-08-15
Payer: MEDICAID

## 2024-08-15 DIAGNOSIS — E11.65 TYPE 2 DIABETES MELLITUS WITH HYPERGLYCEMIA, WITHOUT LONG-TERM CURRENT USE OF INSULIN: Primary | ICD-10-CM

## 2024-08-15 DIAGNOSIS — E78.2 MIXED HYPERLIPIDEMIA: ICD-10-CM

## 2024-08-20 ENCOUNTER — PATIENT MESSAGE (OUTPATIENT)
Dept: FAMILY MEDICINE | Facility: CLINIC | Age: 49
End: 2024-08-20
Payer: MEDICAID

## 2024-08-20 ENCOUNTER — DOCUMENTATION ONLY (OUTPATIENT)
Dept: REHABILITATION | Facility: HOSPITAL | Age: 49
End: 2024-08-20
Payer: MEDICAID

## 2024-08-20 DIAGNOSIS — E11.65 TYPE 2 DIABETES MELLITUS WITH HYPERGLYCEMIA, WITHOUT LONG-TERM CURRENT USE OF INSULIN: ICD-10-CM

## 2024-08-20 RX ORDER — SEMAGLUTIDE 2.68 MG/ML
2 INJECTION, SOLUTION SUBCUTANEOUS
Qty: 3 ML | Refills: 2 | Status: SHIPPED | OUTPATIENT
Start: 2024-08-20

## 2024-08-20 RX ORDER — DAPAGLIFLOZIN 5 MG/1
5 TABLET, FILM COATED ORAL DAILY
Qty: 30 TABLET | Refills: 0 | Status: SHIPPED | OUTPATIENT
Start: 2024-08-20

## 2024-08-23 ENCOUNTER — LAB VISIT (OUTPATIENT)
Dept: LAB | Facility: HOSPITAL | Age: 49
End: 2024-08-23
Attending: NURSE PRACTITIONER
Payer: MEDICAID

## 2024-08-23 ENCOUNTER — PATIENT MESSAGE (OUTPATIENT)
Dept: FAMILY MEDICINE | Facility: CLINIC | Age: 49
End: 2024-08-23
Payer: MEDICAID

## 2024-08-23 DIAGNOSIS — E78.2 MIXED HYPERLIPIDEMIA: ICD-10-CM

## 2024-08-23 DIAGNOSIS — E11.65 TYPE 2 DIABETES MELLITUS WITH HYPERGLYCEMIA, WITHOUT LONG-TERM CURRENT USE OF INSULIN: ICD-10-CM

## 2024-08-23 LAB
ALBUMIN SERPL BCP-MCNC: 4.6 G/DL (ref 3.5–5.2)
ALBUMIN/CREAT UR: 12.8 UG/MG (ref 0–30)
ALP SERPL-CCNC: 23 U/L (ref 55–135)
ALT SERPL W/O P-5'-P-CCNC: 22 U/L (ref 10–44)
ANION GAP SERPL CALC-SCNC: 10 MMOL/L (ref 8–16)
AST SERPL-CCNC: 30 U/L (ref 10–40)
BILIRUB SERPL-MCNC: 0.4 MG/DL (ref 0.1–1)
BUN SERPL-MCNC: 12 MG/DL (ref 6–20)
CALCIUM SERPL-MCNC: 9.8 MG/DL (ref 8.7–10.5)
CHLORIDE SERPL-SCNC: 106 MMOL/L (ref 95–110)
CO2 SERPL-SCNC: 27 MMOL/L (ref 23–29)
CREAT SERPL-MCNC: 0.9 MG/DL (ref 0.5–1.4)
CREAT UR-MCNC: 87.8 MG/DL (ref 15–325)
EST. GFR  (NO RACE VARIABLE): >60 ML/MIN/1.73 M^2
ESTIMATED AVG GLUCOSE: 151 MG/DL (ref 68–131)
GLUCOSE SERPL-MCNC: 130 MG/DL (ref 70–110)
HBA1C MFR BLD: 6.9 % (ref 4.5–6.2)
MICROALBUMIN UR DL<=1MG/L-MCNC: 11.2 UG/ML
POTASSIUM SERPL-SCNC: 4.6 MMOL/L (ref 3.5–5.1)
PROT SERPL-MCNC: 7.3 G/DL (ref 6–8.4)
SODIUM SERPL-SCNC: 143 MMOL/L (ref 136–145)
TSH SERPL DL<=0.005 MIU/L-ACNC: 0.56 UIU/ML (ref 0.34–5.6)

## 2024-08-23 PROCEDURE — 36415 COLL VENOUS BLD VENIPUNCTURE: CPT | Performed by: NURSE PRACTITIONER

## 2024-08-23 PROCEDURE — 83036 HEMOGLOBIN GLYCOSYLATED A1C: CPT | Performed by: NURSE PRACTITIONER

## 2024-08-23 PROCEDURE — 80053 COMPREHEN METABOLIC PANEL: CPT | Performed by: NURSE PRACTITIONER

## 2024-08-23 PROCEDURE — 84443 ASSAY THYROID STIM HORMONE: CPT | Performed by: NURSE PRACTITIONER

## 2024-08-23 PROCEDURE — 82043 UR ALBUMIN QUANTITATIVE: CPT | Performed by: NURSE PRACTITIONER

## 2024-08-23 PROCEDURE — 82570 ASSAY OF URINE CREATININE: CPT | Performed by: NURSE PRACTITIONER

## 2024-08-28 ENCOUNTER — OFFICE VISIT (OUTPATIENT)
Dept: ORTHOPEDICS | Facility: CLINIC | Age: 49
End: 2024-08-28
Payer: MEDICAID

## 2024-08-28 VITALS — BODY MASS INDEX: 30.38 KG/M2 | HEIGHT: 64 IN | WEIGHT: 177.94 LBS

## 2024-08-28 DIAGNOSIS — M17.12 PRIMARY OSTEOARTHRITIS OF LEFT KNEE: ICD-10-CM

## 2024-08-28 DIAGNOSIS — M17.11 PRIMARY OSTEOARTHRITIS OF RIGHT KNEE: Primary | ICD-10-CM

## 2024-08-28 PROCEDURE — 99999PBSHW PR PBB SHADOW TECHNICAL ONLY FILED TO HB: Mod: PBBFAC,,,

## 2024-08-28 PROCEDURE — 20610 DRAIN/INJ JOINT/BURSA W/O US: CPT | Mod: PBBFAC,PN | Performed by: PHYSICIAN ASSISTANT

## 2024-08-28 PROCEDURE — 99212 OFFICE O/P EST SF 10 MIN: CPT | Mod: PBBFAC,PN,25 | Performed by: PHYSICIAN ASSISTANT

## 2024-08-28 PROCEDURE — 99999 PR PBB SHADOW E&M-EST. PATIENT-LVL II: CPT | Mod: PBBFAC,,, | Performed by: PHYSICIAN ASSISTANT

## 2024-08-28 RX ORDER — TRIAMCINOLONE ACETONIDE 40 MG/ML
40 INJECTION, SUSPENSION INTRA-ARTICULAR; INTRAMUSCULAR
Status: DISCONTINUED | OUTPATIENT
Start: 2024-08-28 | End: 2024-08-28 | Stop reason: HOSPADM

## 2024-08-28 RX ADMIN — TRIAMCINOLONE ACETONIDE 40 MG: 40 INJECTION, SUSPENSION INTRA-ARTICULAR; INTRAMUSCULAR at 01:08

## 2024-08-28 NOTE — PROGRESS NOTES
[facility] ORTHOPEDICS  [address]  [place] [address]  Phone: [phone]   [phone]    Patient's PCP: Diego Chiang NP  Referring Provider: No ref. provider found    Subjective:      Chief Complaint:   Chief Complaint   Patient presents with    Right Knee - Pain     Right knee injection and PT f/u from 7/17/24, doing much better, the injection and PT helped       Past Medical History:   Diagnosis Date    Anxiety     Breast cyst     4 mm cyst according to mammo, repeat test 5/2016    Diabetes mellitus, type 2     High calcium levels     Hypertension     Hypertriglyceridemia     Hypothyroidism     Umbilical hernia     since 2009, worse after giving birth       Past Surgical History:   Procedure Laterality Date    ADENOIDECTOMY  1985    HERNIA REPAIR  03/21/2016    INSERTION OF CONTRACEPTIVE CAPSULE  2012    TONSILLECTOMY  1985    TYMPANOSTOMY TUBE PLACEMENT  1985       Current Outpatient Medications   Medication Sig    atorvastatin (LIPITOR) 20 MG tablet Take 1 tablet (20 mg total) by mouth once daily.    blood sugar diagnostic Strp Use 1 strip daily to check blood sugar    dapagliflozin propanediol (FARXIGA) 5 mg Tab tablet Take 1 tablet (5 mg total) by mouth once daily.    EScitalopram oxalate (LEXAPRO) 10 MG tablet Take 1 tablet (10 mg total) by mouth once daily.    fenofibrate 160 MG Tab Take 1 tablet (160 mg total) by mouth once daily.    lancing device with lancets Kit 1 Units by Misc.(Non-Drug; Combo Route) route once daily.    levothyroxine (SYNTHROID) 100 MCG tablet Take 1 tablet (100 mcg total) by mouth before breakfast.    lisinopriL (PRINIVIL,ZESTRIL) 20 MG tablet Take 1 tablet (20 mg total) by mouth once daily.    metFORMIN (GLUCOPHAGE) 500 MG tablet TAKE TWO TABLETS BY MOUTH TWO TIMES A DAY WITH MEALS    nitroGLYCERIN (NITROSTAT) 0.4 MG SL tablet Place 0.4 mg under the tongue every 5 (five) minutes as needed for Chest pain.    semaglutide (OZEMPIC) 2 mg/dose (8 mg/3 mL) PnIj  Inject 2 mg into the skin every 7 days.     No current facility-administered medications for this visit.       Review of patient's allergies indicates:  No Known Allergies    Family History   Problem Relation Name Age of Onset    Hypertension Mother      Stroke Mother      Heart disease Mother      Diabetes Mother      Diabetes Father      Stroke Father      Heart disease Father      Depression Father         Social History     Socioeconomic History    Marital status:    Occupational History    Occupation: business owner   Tobacco Use    Smoking status: Never     Passive exposure: Never    Smokeless tobacco: Never   Substance and Sexual Activity    Alcohol use: No    Drug use: No       Prior to meeting with the patient I reviewed the medical chart in [facility]. This included reviewing the previous progress notes from our office, review of the patient's last appointment with their primary care provider, review of any visits to the emergency room, and review of any pain management appointments or procedures.    History of present illness: 49 y.o. female,  returns to clinic today for evaluation of complaints of right knee pain.  We saw her a month ago, we gave her an injection ordered physical therapy.  Here today for routine follow-up.    \She has had right knee pain off and on for about 4 years maybe even longer. Just kind of waxed and waned but most recently it has become more persistent or unrelenting. Over-the-counter treatments conservative measures medications are helping. She denies any instability however. No locking no catching no sticking no giving way.    She also had fairly advanced arthritic changes noted on her x-ray in the left knee.  Since starting physical therapy, she has noted in the left knee has become more painful or bothersome to her.  No swelling no locking no giving way.         Review of Systems:    Constitutional: Negative for chills, fever and weight loss.   HENT: Negative for  congestion.    Eyes: Negative for discharge and redness.   Respiratory: Negative for cough and shortness of breath.    Cardiovascular: Negative for chest pain.   Gastrointestinal: Negative for nausea and vomiting.   Musculoskeletal: See HPI.   Skin: Negative for rash.   Neurological: Negative for headaches.   Endo/Heme/Allergies: Does not bruise/bleed easily.   Psychiatric/Behavioral: The patient is not nervous/anxious.    All other systems reviewed and are negative.       Objective:      Physical Examination:    Vital Signs:  There were no vitals filed for this visit.    Body mass index is 30.54 kg/m².    This a well-developed, well nourished patient in no acute distress.  They are alert and oriented and cooperative to examination.     Examination of the bilateral knees, skin is dry and intact, no erythema or ecchymosis, no signs symptoms of infection, no effusion. Range of motion 0-120 degrees. Stable to anterior-posterior varus and valgus stress. She was tender over the lateral joint line, no pain with Papito's testing however. Calf soft nontender, straight leg raise negative.       Pertinent New Results:          XRAY Report / Interpretation:           Assessment:       1. Primary osteoarthritis of right knee    2. Primary osteoarthritis of left knee      Plan:     Primary osteoarthritis of right knee    Primary osteoarthritis of left knee  -     Large Joint Aspiration/Injection: L knee        Follow up for 3 mo fi. b/l knee f/u, left knee inj f/u.    Patient with degenerative changes on x-ray slightly worse on the left than the right but she was asymptomatic on the left.  Today the right knee feels good, the left knee is bothering her so we injected the left knee today, anterior lateral approach sterile technique lidocaine and triamcinolone. We will do some physical therapy for both knees, will check her back in 3 months. If she is not better consider an MRI possibly for arthroscopy if she has meniscal  pathology. Ultimately I think the underlying arthritis is more severe than the x-rays shows and we briefly discussed total knee arthroplasty.         SOPHIE Gonzales, PA-C        This note was created using [employer] voice recognition software that occasionally misinterprets words or phrases.

## 2024-08-28 NOTE — PROCEDURES
Large Joint Aspiration/Injection: L knee    Date/Time: 8/28/2024 1:30 PM    Performed by: Zachariah Pagan PA  Authorized by: Zachariah Pagan PA    Consent Done?:  Yes (Verbal)  Indications:  Pain  Site marked: the procedure site was marked    Timeout: prior to procedure the correct patient, procedure, and site was verified    Prep: patient was prepped and draped in usual sterile fashion      Local anesthesia used?: Yes    Local anesthetic:  Lidocaine 1% without epinephrine    Details:  Needle Size:  25 G  Ultrasonic Guidance for needle placement?: No    Location:  Knee  Site:  L knee  Medications:  40 mg triamcinolone acetonide 40 mg/mL  Patient tolerance:  Patient tolerated the procedure well with no immediate complications

## 2024-08-29 NOTE — PROGRESS NOTES
SUBJECTIVE:      Patient ID: Tsering Carlos is a 49 y.o. female.    Chief Complaint: Diabetes and Anxiety (Pt is due for eye exam and mammogram)    Patient is here today to f/u on DM, hypothyroidism, HTN,  hyperlipidemia and review labs. She was started on ozempic and farxiga. She is tolerating them well. A1c is improved. She says her diet has not been the best in the past month due to her mom being sick in and out of the hospital. She was eating on the run a lot. She is having increased stress and said the heart doctor gave her ativan to help.  Previously following with Dr White for cardiology.     Diabetes  She presents for her follow-up diabetic visit. She has type 2 diabetes mellitus. Her disease course has been improving. Hypoglycemia symptoms include nervousness/anxiousness (stable ). Pertinent negatives for hypoglycemia include no confusion, dizziness, headaches, pallor, speech difficulty or sweats. Associated symptoms include foot paresthesias. Pertinent negatives for diabetes include no blurred vision, no chest pain, no fatigue, no foot ulcerations, no polydipsia, no polyphagia, no polyuria, no visual change, no weakness and no weight loss. There are no hypoglycemic complications. Symptoms are improving. Diabetic complications include peripheral neuropathy. Risk factors for coronary artery disease include hypertension, obesity, sedentary lifestyle, dyslipidemia, family history and diabetes mellitus. Current diabetic treatment includes oral agent (dual therapy) (GLP1). She is compliant with treatment most of the time. She is following a generally unhealthy diet. Meal planning includes avoidance of concentrated sweets. She rarely participates in exercise. Home blood sugar record trend: not checking  An ACE inhibitor/angiotensin II receptor blocker is being taken. She does not see a podiatrist.Eye exam is not current.   Hypertension  This is a chronic problem. The current episode started more than 1  year ago. The problem is controlled. Associated symptoms include anxiety. Pertinent negatives include no blurred vision, chest pain, headaches, malaise/fatigue, neck pain, palpitations, peripheral edema, shortness of breath or sweats. There are no associated agents to hypertension. Risk factors for coronary artery disease include obesity, sedentary lifestyle, dyslipidemia, diabetes mellitus and family history. Past treatments include ACE inhibitors. The current treatment provides moderate improvement. Compliance problems include exercise and diet.  Identifiable causes of hypertension include a thyroid problem. There is no history of chronic renal disease.   Hyperlipidemia  This is a chronic problem. The current episode started more than 1 year ago. The problem is controlled. Exacerbating diseases include diabetes, hypothyroidism and obesity. She has no history of chronic renal disease or liver disease. There are no known factors aggravating her hyperlipidemia. Pertinent negatives include no chest pain, leg pain, myalgias or shortness of breath. Current antihyperlipidemic treatment includes statins. The current treatment provides moderate improvement of lipids. Risk factors for coronary artery disease include a sedentary lifestyle, obesity, hypertension, diabetes mellitus, dyslipidemia and family history.   Anxiety  Presents for follow-up visit. Symptoms include excessive worry and nervous/anxious behavior (stable ). Patient reports no chest pain, confusion, dizziness, palpitations, shortness of breath or suicidal ideas. Symptoms occur most days. The severity of symptoms is mild. The quality of sleep is good.     Compliance with medications is %.       Past Surgical History:   Procedure Laterality Date    ADENOIDECTOMY  1985    HERNIA REPAIR  03/21/2016    INSERTION OF CONTRACEPTIVE CAPSULE  2012    TONSILLECTOMY  1985    TYMPANOSTOMY TUBE PLACEMENT  1985     Family History   Problem Relation Name Age of Onset     Hypertension Mother      Stroke Mother      Heart disease Mother      Diabetes Mother      Diabetes Father      Stroke Father      Heart disease Father      Depression Father        Social History     Socioeconomic History    Marital status:    Occupational History    Occupation: business owner   Tobacco Use    Smoking status: Never     Passive exposure: Never    Smokeless tobacco: Never   Substance and Sexual Activity    Alcohol use: No    Drug use: No     Current Outpatient Medications   Medication Sig Dispense Refill    atorvastatin (LIPITOR) 20 MG tablet Take 1 tablet (20 mg total) by mouth once daily. 90 tablet 1    blood sugar diagnostic Strp Use 1 strip daily to check blood sugar 100 strip 1    busPIRone (BUSPAR) 5 MG Tab Take 1 tablet (5 mg total) by mouth 2 (two) times daily. 60 tablet 1    dapagliflozin propanediol (FARXIGA) 5 mg Tab tablet Take 1 tablet (5 mg total) by mouth once daily. 90 tablet 1    EScitalopram oxalate (LEXAPRO) 10 MG tablet Take 1 tablet (10 mg total) by mouth once daily. 90 tablet 1    fenofibrate 160 MG Tab Take 1 tablet (160 mg total) by mouth once daily. 90 tablet 1    lancing device with lancets Kit 1 Units by Misc.(Non-Drug; Combo Route) route once daily. 100 each 0    levothyroxine (SYNTHROID) 100 MCG tablet Take 1 tablet (100 mcg total) by mouth before breakfast. 90 tablet 1    lisinopriL (PRINIVIL,ZESTRIL) 20 MG tablet Take 1 tablet (20 mg total) by mouth once daily. 90 tablet 1    metFORMIN (GLUCOPHAGE) 500 MG tablet Take 2 tablets (1,000 mg total) by mouth 2 (two) times daily with meals. 360 tablet 1    nitroGLYCERIN (NITROSTAT) 0.4 MG SL tablet Place 0.4 mg under the tongue every 5 (five) minutes as needed for Chest pain.      semaglutide (OZEMPIC) 2 mg/dose (8 mg/3 mL) PnIj Inject 2 mg into the skin every 7 days. 3 mL 2     No current facility-administered medications for this visit.     Review of patient's allergies indicates:  No Known Allergies   Past  "Medical History:   Diagnosis Date    Anxiety     Breast cyst     4 mm cyst according to mammo, repeat test 5/2016    Diabetes mellitus, type 2     High calcium levels     Hypertension     Hypertriglyceridemia     Hypothyroidism     Umbilical hernia     since 2009, worse after giving birth     Past Surgical History:   Procedure Laterality Date    ADENOIDECTOMY  1985    HERNIA REPAIR  03/21/2016    INSERTION OF CONTRACEPTIVE CAPSULE  2012    TONSILLECTOMY  1985    TYMPANOSTOMY TUBE PLACEMENT  1985       Review of Systems   Constitutional:  Negative for appetite change, chills, diaphoresis, fatigue, malaise/fatigue, unexpected weight change and weight loss.   HENT:  Negative for ear discharge, hearing loss, trouble swallowing and voice change.    Eyes:  Negative for blurred vision, photophobia and pain.   Respiratory:  Negative for chest tightness, shortness of breath and stridor.    Cardiovascular:  Negative for chest pain and palpitations.   Gastrointestinal:  Negative for abdominal pain, blood in stool and vomiting.   Endocrine: Negative for cold intolerance, heat intolerance, polydipsia, polyphagia and polyuria.   Genitourinary:  Negative for difficulty urinating and flank pain.   Musculoskeletal:  Negative for joint swelling, myalgias, neck pain and neck stiffness.   Skin:  Negative for pallor.   Neurological:  Negative for dizziness, speech difficulty, weakness and headaches.   Hematological:  Does not bruise/bleed easily.   Psychiatric/Behavioral:  Negative for confusion, dysphoric mood, self-injury, sleep disturbance and suicidal ideas. The patient is nervous/anxious (stable ).       OBJECTIVE:      Vitals:    08/30/24 1007   BP: 126/70   Pulse: (P) 98   SpO2: 99%   Weight: 78.5 kg (173 lb)   Height: 5' 4" (1.626 m)     Physical Exam  Vitals and nursing note reviewed.   Constitutional:       General: She is not in acute distress.     Appearance: She is well-developed.   HENT:      Head: Normocephalic and " atraumatic.      Right Ear: Tympanic membrane normal.      Left Ear: Tympanic membrane normal.      Nose: Nose normal.      Mouth/Throat:      Pharynx: Uvula midline.   Eyes:      General: Lids are normal.      Conjunctiva/sclera: Conjunctivae normal.      Pupils: Pupils are equal, round, and reactive to light.      Right eye: Pupil is round and reactive.      Left eye: Pupil is round and reactive.   Neck:      Thyroid: No thyromegaly.      Vascular: No JVD.      Trachea: Trachea normal.   Cardiovascular:      Rate and Rhythm: Normal rate and regular rhythm.      Pulses: Normal pulses.      Heart sounds: Normal heart sounds. No murmur heard.  Pulmonary:      Effort: Pulmonary effort is normal. No tachypnea or respiratory distress.      Breath sounds: Normal breath sounds. No wheezing, rhonchi or rales.   Abdominal:      General: Bowel sounds are normal.      Palpations: Abdomen is soft.      Tenderness: There is no abdominal tenderness.   Musculoskeletal:         General: Normal range of motion.      Cervical back: Normal range of motion and neck supple.      Right lower leg: No edema.      Left lower leg: No edema.   Lymphadenopathy:      Cervical: No cervical adenopathy.   Skin:     General: Skin is warm and dry.      Findings: No rash.   Neurological:      Mental Status: She is alert and oriented to person, place, and time.   Psychiatric:         Mood and Affect: Mood normal.         Speech: Speech normal.         Behavior: Behavior normal. Behavior is cooperative.         Thought Content: Thought content normal.         Judgment: Judgment normal.          Last visit note, most recent available labs, and health maintenance reviewed    Lab Visit on 08/23/2024   Component Date Value Ref Range Status    Sodium 08/23/2024 143  136 - 145 mmol/L Final    Potassium 08/23/2024 4.6  3.5 - 5.1 mmol/L Final    Chloride 08/23/2024 106  95 - 110 mmol/L Final    CO2 08/23/2024 27  23 - 29 mmol/L Final    Glucose 08/23/2024 130  (H)  70 - 110 mg/dL Final    BUN 08/23/2024 12  6 - 20 mg/dL Final    Creatinine 08/23/2024 0.9  0.5 - 1.4 mg/dL Final    Calcium 08/23/2024 9.8  8.7 - 10.5 mg/dL Final    Total Protein 08/23/2024 7.3  6.0 - 8.4 g/dL Final    Albumin 08/23/2024 4.6  3.5 - 5.2 g/dL Final    Total Bilirubin 08/23/2024 0.4  0.1 - 1.0 mg/dL Final    Comment: For infants and newborns, interpretation of results should be based  on gestational age, weight and in agreement with clinical  observations.    Premature Infant recommended reference ranges:  Up to 24 hours.............<8.0 mg/dL  Up to 48 hours............<12.0 mg/dL  3-5 days..................<15.0 mg/dL  6-29 days.................<15.0 mg/dL      Alkaline Phosphatase 08/23/2024 23 (L)  55 - 135 U/L Final    AST 08/23/2024 30  10 - 40 U/L Final    ALT 08/23/2024 22  10 - 44 U/L Final    eGFR 08/23/2024 >60.0  >60 mL/min/1.73 m^2 Final    Anion Gap 08/23/2024 10  8 - 16 mmol/L Final    TSH 08/23/2024 0.559  0.340 - 5.600 uIU/mL Final    Hemoglobin A1C 08/23/2024 6.9 (H)  4.5 - 6.2 % Final    Comment: According to ADA guidelines, hemoglobin A1C <7.0% represents  optimal control in non-pregnant diabetic patients.  Different  metrics may apply to specific populations.   Standards of Medical Care in Diabetes - 2016.    For the purpose of screening for the presence of diabetes:  <5.7%     Consistent with the absence of diabetes  5.7-6.4%  Consistent with increasing risk for diabetes   (prediabetes)  >or=6.5%  Consistent with diabetes    Currently no consensus exists for use of hemoglobin A1C  for diagnosis of diabetes for children.      Estimated Avg Glucose 08/23/2024 151 (H)  68 - 131 mg/dL Final    Microalbumin, Urine 08/23/2024 11.2  <19.9 ug/mL Final    Creatinine, Urine 08/23/2024 87.8  15.0 - 325.0 mg/dL Final    Comment: The random urine reference ranges provided were established   for 24 hour urine collections.  No reference ranges exist for  random urine specimens.  Correlate  clinically.      Microalb/Creat Ratio 08/23/2024 12.8  0.0 - 30.0 ug/mg Final   ]  Assessment:       1. Type 2 diabetes mellitus with hyperglycemia, without long-term current use of insulin    2. Mixed hyperlipidemia    3. Benign hypertension    4. Anxiety    5. Hypertriglyceridemia    6. Subclinical hypothyroidism        Plan:       Type 2 diabetes mellitus with hyperglycemia, without long-term current use of insulin  -     metFORMIN (GLUCOPHAGE) 500 MG tablet; Take 2 tablets (1,000 mg total) by mouth 2 (two) times daily with meals.  Dispense: 360 tablet; Refill: 1  -     dapagliflozin propanediol (FARXIGA) 5 mg Tab tablet; Take 1 tablet (5 mg total) by mouth once daily.  Dispense: 90 tablet; Refill: 1    Cont ozempic    Mixed hyperlipidemia  -     atorvastatin (LIPITOR) 20 MG tablet; Take 1 tablet (20 mg total) by mouth once daily.  Dispense: 90 tablet; Refill: 1    Benign hypertension  -     lisinopriL (PRINIVIL,ZESTRIL) 20 MG tablet; Take 1 tablet (20 mg total) by mouth once daily.  Dispense: 90 tablet; Refill: 1    Anxiety  -     EScitalopram oxalate (LEXAPRO) 10 MG tablet; Take 1 tablet (10 mg total) by mouth once daily.  Dispense: 90 tablet; Refill: 1  -  start   busPIRone (BUSPAR) 5 MG Tab; Take 1 tablet (5 mg total) by mouth 2 (two) times daily.  Dispense: 60 tablet; Refill: 1    Hypertriglyceridemia  -     fenofibrate 160 MG Tab; Take 1 tablet (160 mg total) by mouth once daily.  Dispense: 90 tablet; Refill: 1    Subclinical hypothyroidism  -     levothyroxine (SYNTHROID) 100 MCG tablet; Take 1 tablet (100 mcg total) by mouth before breakfast.  Dispense: 90 tablet; Refill: 1        Follow up in about 5 months (around 1/30/2025) for dm.      8/30/2024 JOSE Cervantes, BABITAP

## 2024-08-30 ENCOUNTER — OFFICE VISIT (OUTPATIENT)
Dept: FAMILY MEDICINE | Facility: CLINIC | Age: 49
End: 2024-08-30
Payer: MEDICAID

## 2024-08-30 VITALS
OXYGEN SATURATION: 99 % | SYSTOLIC BLOOD PRESSURE: 126 MMHG | DIASTOLIC BLOOD PRESSURE: 70 MMHG | WEIGHT: 173 LBS | BODY MASS INDEX: 29.53 KG/M2 | HEIGHT: 64 IN

## 2024-08-30 DIAGNOSIS — F41.9 ANXIETY: ICD-10-CM

## 2024-08-30 DIAGNOSIS — I10 BENIGN HYPERTENSION: ICD-10-CM

## 2024-08-30 DIAGNOSIS — E78.1 HYPERTRIGLYCERIDEMIA: ICD-10-CM

## 2024-08-30 DIAGNOSIS — E11.65 TYPE 2 DIABETES MELLITUS WITH HYPERGLYCEMIA, WITHOUT LONG-TERM CURRENT USE OF INSULIN: Primary | ICD-10-CM

## 2024-08-30 DIAGNOSIS — E03.8 SUBCLINICAL HYPOTHYROIDISM: ICD-10-CM

## 2024-08-30 DIAGNOSIS — E78.2 MIXED HYPERLIPIDEMIA: ICD-10-CM

## 2024-08-30 RX ORDER — DAPAGLIFLOZIN 5 MG/1
5 TABLET, FILM COATED ORAL DAILY
Qty: 90 TABLET | Refills: 1 | Status: SHIPPED | OUTPATIENT
Start: 2024-08-30

## 2024-08-30 RX ORDER — FENOFIBRATE 160 MG/1
160 TABLET ORAL DAILY
Qty: 90 TABLET | Refills: 1 | Status: SHIPPED | OUTPATIENT
Start: 2024-08-30

## 2024-08-30 RX ORDER — METFORMIN HYDROCHLORIDE 500 MG/1
1000 TABLET ORAL 2 TIMES DAILY WITH MEALS
Qty: 360 TABLET | Refills: 1 | Status: SHIPPED | OUTPATIENT
Start: 2024-08-30

## 2024-08-30 RX ORDER — LEVOTHYROXINE SODIUM 100 UG/1
100 TABLET ORAL
Qty: 90 TABLET | Refills: 1 | Status: SHIPPED | OUTPATIENT
Start: 2024-08-30

## 2024-08-30 RX ORDER — ESCITALOPRAM OXALATE 10 MG/1
10 TABLET ORAL DAILY
Qty: 90 TABLET | Refills: 1 | Status: SHIPPED | OUTPATIENT
Start: 2024-08-30

## 2024-08-30 RX ORDER — LISINOPRIL 20 MG/1
20 TABLET ORAL DAILY
Qty: 90 TABLET | Refills: 1 | Status: SHIPPED | OUTPATIENT
Start: 2024-08-30

## 2024-08-30 RX ORDER — BUSPIRONE HYDROCHLORIDE 5 MG/1
5 TABLET ORAL 2 TIMES DAILY
Qty: 60 TABLET | Refills: 1 | Status: SHIPPED | OUTPATIENT
Start: 2024-08-30

## 2024-08-30 RX ORDER — ATORVASTATIN CALCIUM 20 MG/1
20 TABLET, FILM COATED ORAL DAILY
Qty: 90 TABLET | Refills: 1 | Status: SHIPPED | OUTPATIENT
Start: 2024-08-30

## 2024-09-18 LAB
PAP RECOMMENDATION EXT: NORMAL
PAP SMEAR: NORMAL

## 2024-10-16 ENCOUNTER — PATIENT MESSAGE (OUTPATIENT)
Dept: FAMILY MEDICINE | Facility: CLINIC | Age: 49
End: 2024-10-16
Payer: MEDICAID

## 2024-10-27 DIAGNOSIS — F41.9 ANXIETY: ICD-10-CM

## 2024-10-28 RX ORDER — BUSPIRONE HYDROCHLORIDE 5 MG/1
5 TABLET ORAL 2 TIMES DAILY
Qty: 60 TABLET | Refills: 1 | Status: SHIPPED | OUTPATIENT
Start: 2024-10-28

## 2024-10-30 ENCOUNTER — PATIENT MESSAGE (OUTPATIENT)
Dept: FAMILY MEDICINE | Facility: CLINIC | Age: 49
End: 2024-10-30
Payer: MEDICAID

## 2024-10-30 DIAGNOSIS — E11.65 TYPE 2 DIABETES MELLITUS WITH HYPERGLYCEMIA, WITHOUT LONG-TERM CURRENT USE OF INSULIN: ICD-10-CM

## 2024-10-31 DIAGNOSIS — Z12.31 VISIT FOR SCREENING MAMMOGRAM: Primary | ICD-10-CM

## 2024-10-31 RX ORDER — SEMAGLUTIDE 2.68 MG/ML
2 INJECTION, SOLUTION SUBCUTANEOUS
Qty: 3 ML | Refills: 2 | Status: SHIPPED | OUTPATIENT
Start: 2024-10-31

## 2024-10-31 RX ORDER — DAPAGLIFLOZIN 5 MG/1
5 TABLET, FILM COATED ORAL DAILY
Qty: 90 TABLET | Refills: 0 | Status: SHIPPED | OUTPATIENT
Start: 2024-10-31

## 2024-12-02 ENCOUNTER — PATIENT OUTREACH (OUTPATIENT)
Dept: ADMINISTRATIVE | Facility: HOSPITAL | Age: 49
End: 2024-12-02
Payer: MEDICAID

## 2024-12-02 NOTE — PROGRESS NOTES
Population Health Chart Review & Patient Outreach Details      Additional [facility] Health Notes:               Updates Requested / Reviewed:      Updated Care Coordination Note, Care Everywhere, , External Sources: [facility] and [facility], Care Team Updated, and Immunizations Reconciliation Completed or Queried: [facility] Topics Overdue:      VBHM Score: 2     Eye Exam  Mammogram                       Health Maintenance Topic(s) Outreach Outcomes & Actions Taken:    Breast Cancer Screening - Outreach Outcomes & Actions Taken  : Reminder pt portal message sent.    Cervical Cancer Screening - Outreach Outcomes & Actions Taken  : External Records Uploaded & Care Team Updated if Applicable    Eye Exam - Outreach Outcomes & Actions Taken  : Reminder pt portal message sent.

## 2024-12-04 ENCOUNTER — OFFICE VISIT (OUTPATIENT)
Dept: ORTHOPEDICS | Facility: CLINIC | Age: 49
End: 2024-12-04
Payer: MEDICAID

## 2024-12-04 VITALS — BODY MASS INDEX: 30.39 KG/M2 | HEIGHT: 64 IN | WEIGHT: 178 LBS

## 2024-12-04 DIAGNOSIS — M17.11 PRIMARY OSTEOARTHRITIS OF RIGHT KNEE: Primary | ICD-10-CM

## 2024-12-04 DIAGNOSIS — M17.12 PRIMARY OSTEOARTHRITIS OF LEFT KNEE: ICD-10-CM

## 2024-12-04 DIAGNOSIS — M23.301 DEGENERATIVE TEAR OF LATERAL MENISCUS OF LEFT KNEE: ICD-10-CM

## 2024-12-04 PROCEDURE — 20610 DRAIN/INJ JOINT/BURSA W/O US: CPT | Mod: PBBFAC,PN | Performed by: PHYSICIAN ASSISTANT

## 2024-12-04 PROCEDURE — 99999 PR PBB SHADOW E&M-EST. PATIENT-LVL III: CPT | Mod: PBBFAC,,, | Performed by: PHYSICIAN ASSISTANT

## 2024-12-04 PROCEDURE — 99999PBSHW PR PBB SHADOW TECHNICAL ONLY FILED TO HB: Mod: PBBFAC,,,

## 2024-12-04 PROCEDURE — 99213 OFFICE O/P EST LOW 20 MIN: CPT | Mod: PBBFAC,PN,25 | Performed by: PHYSICIAN ASSISTANT

## 2024-12-04 RX ORDER — PROGESTERONE 200 MG/1
CAPSULE ORAL
COMMUNITY
Start: 2024-11-21

## 2024-12-04 RX ORDER — TRIAMCINOLONE ACETONIDE 40 MG/ML
40 INJECTION, SUSPENSION INTRA-ARTICULAR; INTRAMUSCULAR
Status: DISCONTINUED | OUTPATIENT
Start: 2024-12-04 | End: 2024-12-04 | Stop reason: HOSPADM

## 2024-12-04 RX ORDER — ESTRADIOL 0.5 MG/1
0.5 TABLET ORAL
COMMUNITY
Start: 2024-11-21

## 2024-12-04 RX ADMIN — TRIAMCINOLONE ACETONIDE 40 MG: 40 INJECTION, SUSPENSION INTRA-ARTICULAR; INTRAMUSCULAR at 01:12

## 2024-12-04 NOTE — PROCEDURES
Large Joint Aspiration/Injection: L knee    Date/Time: 12/4/2024 1:30 PM    Performed by: Zachariah Pagan PA  Authorized by: Zachariah Pagan PA    Consent Done?:  Yes (Verbal)  Indications:  Pain  Site marked: the procedure site was marked    Timeout: prior to procedure the correct patient, procedure, and site was verified    Prep: patient was prepped and draped in usual sterile fashion      Local anesthesia used?: Yes    Local anesthetic:  Lidocaine 1% without epinephrine    Details:  Needle Size:  25 G  Ultrasonic Guidance for needle placement?: No    Location:  Knee  Site:  L knee  Medications:  40 mg triamcinolone acetonide 40 mg/mL  Patient tolerance:  Patient tolerated the procedure well with no immediate complications

## 2024-12-04 NOTE — PROGRESS NOTES
[facility]  [address]  [place] [address]  Phone: [phone]   [phone]    Patient's PCP: Diego Chiang NP  Referring Provider: No ref. provider found    Subjective:      Chief Complaint:   Chief Complaint   Patient presents with    Left Knee - Pain     Injected left knee 8/28/24, has pain and swelling, will get flare ups once or twice a week, the injection did help a lot,        Past Medical History:   Diagnosis Date    Anxiety     Breast cyst     4 mm cyst according to mammo, repeat test 5/2016    Diabetes mellitus, type 2     High calcium levels     Hypertension     Hypertriglyceridemia     Hypothyroidism     Umbilical hernia     since 2009, worse after giving birth       Past Surgical History:   Procedure Laterality Date    ADENOIDECTOMY  1985    HERNIA REPAIR  03/21/2016    INSERTION OF CONTRACEPTIVE CAPSULE  2012    TONSILLECTOMY  1985    TYMPANOSTOMY TUBE PLACEMENT  1985       Current Outpatient Medications   Medication Sig    atorvastatin (LIPITOR) 20 MG tablet Take 1 tablet (20 mg total) by mouth once daily.    blood sugar diagnostic Strp Use 1 strip daily to check blood sugar    busPIRone (BUSPAR) 5 MG Tab TAKE ONE TABLET BY MOUTH TWICE DAILY    dapagliflozin propanediol (FARXIGA) 5 mg Tab tablet Take 1 tablet (5 mg total) by mouth once daily.    EScitalopram oxalate (LEXAPRO) 10 MG tablet Take 1 tablet (10 mg total) by mouth once daily.    estradioL (ESTRACE) 0.5 MG tablet Take 0.5 mg by mouth.    fenofibrate 160 MG Tab Take 1 tablet (160 mg total) by mouth once daily.    lancing device with lancets Kit 1 Units by Misc.(Non-Drug; Combo Route) route once daily.    levothyroxine (SYNTHROID) 100 MCG tablet Take 1 tablet (100 mcg total) by mouth before breakfast.    lisinopriL (PRINIVIL,ZESTRIL) 20 MG tablet Take 1 tablet (20 mg total) by mouth once daily.    metFORMIN (GLUCOPHAGE) 500 MG tablet Take 2 tablets (1,000 mg total) by mouth 2 (two) times daily with meals.     nitroGLYCERIN (NITROSTAT) 0.4 MG SL tablet Place 0.4 mg under the tongue every 5 (five) minutes as needed for Chest pain.    progesterone (PROMETRIUM) 200 MG capsule TAKE ONE CAPSULE BY MOUTH ONCE DAILY with dinner    semaglutide (OZEMPIC) 2 mg/dose (8 mg/3 mL) PnIj Inject 2 mg into the skin every 7 days.     Current Facility-Administered Medications   Medication    triamcinolone acetonide injection 40 mg       Review of patient's allergies indicates:  No Known Allergies    Family History   Problem Relation Name Age of Onset    Hypertension Mother      Stroke Mother      Heart disease Mother      Diabetes Mother      Diabetes Father      Stroke Father      Heart disease Father      Depression Father         Social History     Socioeconomic History    Marital status:    Occupational History    Occupation: business owner   Tobacco Use    Smoking status: Never     Passive exposure: Never    Smokeless tobacco: Never   Substance and Sexual Activity    Alcohol use: No    Drug use: No       Prior to meeting with the patient I reviewed the medical chart in [employer]. This included reviewing the previous progress notes from our office, review of the patient's last appointment with their primary care provider, review of any visits to the emergency room, and review of any pain management appointments or procedures.    History of present illness: 49 y.o. female,  returns to clinic today for evaluation of complaints of left knee pain.    She has had bilateral knee pain, she has got mild-to-moderate arthritic changes bilaterally.  We have injected both knees once in the last 6 months.  Left knee is bothering her again.  She is requesting repeat injection.       Review of Systems:    Constitutional: Negative for chills, fever and weight loss.   HENT: Negative for congestion.    Eyes: Negative for discharge and redness.   Respiratory: Negative for cough and shortness of breath.    Cardiovascular: Negative for chest pain.    Gastrointestinal: Negative for nausea and vomiting.   Musculoskeletal: See HPI.   Skin: Negative for rash.   Neurological: Negative for headaches.   Endo/Heme/Allergies: Does not bruise/bleed easily.   Psychiatric/Behavioral: The patient is not nervous/anxious.    All other systems reviewed and are negative.       Objective:      Physical Examination:    Vital Signs:  There were no vitals filed for this visit.    Body mass index is 30.55 kg/m².    This a well-developed, well nourished patient in no acute distress.  They are alert and oriented and cooperative to examination.     Examination of the left knee, she has a +1 effusion, skin is dry and intact, no erythema or ecchymosis, no signs symptoms of infection.  Range motion 0-120 degrees.  Stable anterior-posterior varus and valgus stress.  Homans signs negative, straight leg raise is negative.  Distally neurovascularly intact.      Pertinent New Results:          XRAY Report / Interpretation:             Assessment:       1. Primary osteoarthritis of right knee    2. Degenerative tear of lateral meniscus of left knee    3. Primary osteoarthritis of left knee      Plan:     Primary osteoarthritis of right knee    Degenerative tear of lateral meniscus of left knee  -     MRI Knee Without Contrast Left; Future; Expected date: 12/04/2024    Primary osteoarthritis of left knee  -     Large Joint Aspiration/Injection: L knee  -     triamcinolone acetonide injection 40 mg    Other orders  -     Cancel: Large Joint Aspiration/Injection: L knee        Follow up in about 2 weeks (around 12/18/2024) for Re-check symptoms, MRI Results, Tues/Thurs with Dr. Jean Baptiste.    Osteoarthritis left knee, degenerative medial meniscal tear.  We had a detailed discussion today about the patient's symptoms.  At 49 years young, I do think ultimately she is going to need a knee replacement.  Could she would benefit from arthroscopy if she has some meniscal component exacerbating her pain.  We  reinjected the left knee today, we ordered an MRI, she will follow up in a few weeks to review the results.    The patient and I had a thorough discussion today.  We discussed the working diagnosis as well as several other potential alternative diagnoses.  We discussed treatment options, both conservative and surgical.  Conservative treatment options would include things such as activity modifications, workplace modifications, a period of rest, oral versus topical over the counter and oral versus topical prescription anti-inflammatory medications, physical therapy / occupational therapy, splinting / bracing, immobilization, corticosteroid injections, and others.        SOPHIE Gonzales, PA-C        EMR Statement:  Please note that portions of this patient encounter record were imported from the patients electronic medical record and that others were dictated using voice recognition software. For these reasons grammatical errors, nonsensical language, and apparently contradictory statements may be present.  These should be disregarded or interpreted with respect to the context of the document.

## 2024-12-11 ENCOUNTER — HOSPITAL ENCOUNTER (OUTPATIENT)
Dept: RADIOLOGY | Facility: HOSPITAL | Age: 49
Discharge: HOME | End: 2024-12-11
Attending: PHYSICIAN ASSISTANT
Payer: MEDICAID

## 2024-12-11 DIAGNOSIS — M23.301 DEGENERATIVE TEAR OF LATERAL MENISCUS OF LEFT KNEE: ICD-10-CM

## 2024-12-11 PROCEDURE — 73721 MRI JNT OF LWR EXTRE W/O DYE: CPT | Mod: 26,LT,, | Performed by: RADIOLOGY

## 2024-12-11 PROCEDURE — 73721 MRI JNT OF LWR EXTRE W/O DYE: CPT | Mod: TC,PO,LT

## 2024-12-31 DIAGNOSIS — F41.9 ANXIETY: ICD-10-CM

## 2024-12-31 RX ORDER — BUSPIRONE HYDROCHLORIDE 5 MG/1
5 TABLET ORAL 2 TIMES DAILY
Qty: 60 TABLET | Refills: 1 | Status: SHIPPED | OUTPATIENT
Start: 2024-12-31

## 2025-01-14 DIAGNOSIS — E11.65 TYPE 2 DIABETES MELLITUS WITH HYPERGLYCEMIA, WITHOUT LONG-TERM CURRENT USE OF INSULIN: ICD-10-CM

## 2025-01-16 RX ORDER — SEMAGLUTIDE 2.68 MG/ML
INJECTION, SOLUTION SUBCUTANEOUS
Qty: 3 ML | Refills: 2 | Status: SHIPPED | OUTPATIENT
Start: 2025-01-16

## 2025-01-25 ENCOUNTER — PATIENT MESSAGE (OUTPATIENT)
Dept: FAMILY MEDICINE | Facility: CLINIC | Age: 50
End: 2025-01-25
Payer: MEDICAID

## 2025-01-27 NOTE — TELEPHONE ENCOUNTER
We can see if mounjaro is covered at 5mg and see how she does on that dose. She will stop the ozempic . If mounjaro not covered we can decrease the ozempic to 1mg

## 2025-03-17 ENCOUNTER — PATIENT MESSAGE (OUTPATIENT)
Dept: FAMILY MEDICINE | Facility: CLINIC | Age: 50
End: 2025-03-17
Payer: MEDICAID

## 2025-03-17 DIAGNOSIS — I10 BENIGN HYPERTENSION: ICD-10-CM

## 2025-03-17 DIAGNOSIS — E11.65 TYPE 2 DIABETES MELLITUS WITH HYPERGLYCEMIA, WITHOUT LONG-TERM CURRENT USE OF INSULIN: ICD-10-CM

## 2025-03-17 DIAGNOSIS — E78.2 MIXED HYPERLIPIDEMIA: ICD-10-CM

## 2025-03-17 DIAGNOSIS — E78.1 HYPERTRIGLYCERIDEMIA: ICD-10-CM

## 2025-03-17 DIAGNOSIS — E03.9 HYPOTHYROIDISM, UNSPECIFIED TYPE: ICD-10-CM

## 2025-03-17 DIAGNOSIS — E11.9 DIABETES MELLITUS WITHOUT COMPLICATION: ICD-10-CM

## 2025-03-17 DIAGNOSIS — Z79.899 ENCOUNTER FOR LONG-TERM (CURRENT) USE OF MEDICATIONS: Primary | ICD-10-CM

## 2025-03-19 ENCOUNTER — TELEPHONE (OUTPATIENT)
Dept: FAMILY MEDICINE | Facility: CLINIC | Age: 50
End: 2025-03-19
Payer: MEDICAID

## 2025-03-19 DIAGNOSIS — M17.12 PRIMARY OSTEOARTHRITIS OF LEFT KNEE: Primary | ICD-10-CM

## 2025-03-19 NOTE — TELEPHONE ENCOUNTER
Referral  to Dr. Jean Baptiste and ANGIE Gonzales @ [facility] to continue care on left knee

## 2025-03-19 NOTE — TELEPHONE ENCOUNTER
----- Message from Ca sent at 3/19/2025  9:55 AM CDT -----  Patient is asking for a referral to be sent to Dr. Gonzalez. Has an appt on 4/3 and need for this referral to be sent by that time if possible. [phone]   Subjective:   Chief Complaint   Patient presents with    Transition of Care Management        Patient ID: Wil Deal is a 68 y o  female  Recent hospital stay reviewed  Had recurrent GI bleeding and had infection with bacteremia  Treated with antibiotics  Had PRBC's and gastric AVMs cauterized by GI  Is feeling better  No change in color of BM or blood noted  Is following up with [facility] for treatment of gastric malignancy  The following portions of the patient's history were reviewed and updated as appropriate: allergies, current medications, past family history, past medical history, past social history, past surgical history and problem list     Review of Systems   Constitutional: Positive for fatigue  Negative for fever  HENT: Negative for hearing loss  Eyes: Negative for visual disturbance  Respiratory: Negative for cough, chest tightness, shortness of breath and wheezing  Cardiovascular: Negative for chest pain, palpitations and leg swelling  Gastrointestinal: Negative for abdominal pain, blood in stool, constipation, diarrhea and nausea  Genitourinary: Negative for dysuria and hematuria  Musculoskeletal: Positive for arthralgias and back pain  Neurological: Positive for weakness  Negative for dizziness, numbness and headaches  Psychiatric/Behavioral: Negative for confusion and dysphoric mood  All other systems reviewed and are negative          Current Outpatient Medications on File Prior to Visit   Medication Sig Dispense Refill    acetaminophen (TYLENOL) 500 mg tablet Take 500 mg by mouth every 6 (six) hours as needed for mild pain      aspirin 81 mg chewable tablet Chew 81 mg daily      Avapritinib (Ayvakit) 200 MG TABS Take 200 mg by mouth daily      Azelastine HCl 137 MCG/SPRAY SOLN instill 2 sprays into each nostril twice a day if needed for congestion  0    calcium carbonate (Tums) 500 mg chewable tablet Chew 1 tablet       diphenhydrAMINE (BENADRYL) 25 mg tablet Take 25 mg by mouth every 6 (six) hours as needed for itching      diphenoxylate-atropine (LOMOTIL) 2 5-0 025 mg per tablet TAKE 1 TABLET BY MOUTH 4 TIMES A DAY AS NEEDED FOR DIARRHEA 30 tablet 5    docusate sodium (COLACE) 100 mg capsule Take 100 mg by mouth 2 (two) times a day as needed for constipation       ferrous sulfate 324 (65 Fe) mg Take 1 tablet (324 mg total) by mouth 2 (two) times a day before meals 60 tablet 2    hydroxychloroquine (PLAQUENIL) 200 mg tablet Take 200 mg by mouth 2 (two) times a day with meals Alternate with daily and BID      loperamide (IMODIUM) 2 mg capsule Take 2 capsules by mouth 4 (four) times a day as needed       metoprolol succinate (TOPROL-XL) 25 mg 24 hr tablet Take 25 mg by mouth daily      multivitamin-iron-minerals-folic acid (CENTRUM) chewable tablet Chew 1 tablet daily      ondansetron (ZOFRAN) 8 mg tablet 8 mg daily with breakfast Prior to chemo      pantoprazole (PROTONIX) 40 mg tablet Take 1 tablet (40 mg total) by mouth daily 180 tablet 0    traMADol (ULTRAM) 50 mg tablet take 2 tablets by mouth four times a day 120 tablet 0    Triamcinolone Acetonide (NASACORT ALLERGY 24HR NA) into each nostril 1 spray each nostril--at bedtime   (OTC)        No current facility-administered medications on file prior to visit  Objective:  Vitals:    09/07/21 1405   BP: 116/68   Pulse: 83   SpO2: 97%   Weight: 53 5 kg (118 lb)      Physical Exam  Vitals and nursing note reviewed  Constitutional:       General: She is not in acute distress  Cardiovascular:      Rate and Rhythm: Normal rate and regular rhythm  Pulmonary:      Effort: Pulmonary effort is normal       Breath sounds: Normal breath sounds  Musculoskeletal:         General: Normal range of motion  Cervical back: Normal range of motion  Skin:     Findings: No rash  Neurological:      Mental Status: She is alert and oriented to person, place, and time     Psychiatric: Thought Content: Thought content normal             Assessment/Plan:    TCM call is documented in chart   todays's visit is TCM office visit  All hospital records, consults and labs reviewed         Diagnoses and all orders for this visit:    Gastric AVM    ABLA (acute blood loss anemia)    Acute on chronic anemia    Elevated troponin level not due myocardial infarction    GIST (gastrointestinal stroma tumor), malignant, colon (HCC)    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

## 2025-03-20 ENCOUNTER — LAB VISIT (OUTPATIENT)
Dept: LAB | Facility: HOSPITAL | Age: 50
End: 2025-03-20
Attending: NURSE PRACTITIONER
Payer: MEDICAID

## 2025-03-20 ENCOUNTER — RESULTS FOLLOW-UP (OUTPATIENT)
Dept: FAMILY MEDICINE | Facility: CLINIC | Age: 50
End: 2025-03-20

## 2025-03-20 DIAGNOSIS — E78.1 HYPERTRIGLYCERIDEMIA: ICD-10-CM

## 2025-03-20 DIAGNOSIS — E11.65 TYPE 2 DIABETES MELLITUS WITH HYPERGLYCEMIA, WITHOUT LONG-TERM CURRENT USE OF INSULIN: ICD-10-CM

## 2025-03-20 DIAGNOSIS — E03.9 HYPOTHYROIDISM, UNSPECIFIED TYPE: ICD-10-CM

## 2025-03-20 DIAGNOSIS — E11.9 DIABETES MELLITUS WITHOUT COMPLICATION: ICD-10-CM

## 2025-03-20 DIAGNOSIS — E78.2 MIXED HYPERLIPIDEMIA: ICD-10-CM

## 2025-03-20 DIAGNOSIS — I10 BENIGN HYPERTENSION: ICD-10-CM

## 2025-03-20 DIAGNOSIS — Z79.899 ENCOUNTER FOR LONG-TERM (CURRENT) USE OF MEDICATIONS: ICD-10-CM

## 2025-03-20 LAB
ALBUMIN SERPL BCP-MCNC: 4.4 G/DL (ref 3.5–5.2)
ALP SERPL-CCNC: 33 U/L (ref 55–135)
ALT SERPL W/O P-5'-P-CCNC: 18 U/L (ref 10–44)
ANION GAP SERPL CALC-SCNC: 10 MMOL/L (ref 8–16)
AST SERPL-CCNC: 17 U/L (ref 10–40)
BASOPHILS # BLD AUTO: 0.07 K/UL (ref 0–0.2)
BASOPHILS NFR BLD: 0.8 % (ref 0–1.9)
BILIRUB SERPL-MCNC: 0.7 MG/DL (ref 0.1–1)
BUN SERPL-MCNC: 20 MG/DL (ref 6–20)
CALCIUM SERPL-MCNC: 10.1 MG/DL (ref 8.7–10.5)
CHLORIDE SERPL-SCNC: 101 MMOL/L (ref 95–110)
CHOLEST SERPL-MCNC: 223 MG/DL (ref 120–199)
CHOLEST/HDLC SERPL: 5.4 {RATIO} (ref 2–5)
CO2 SERPL-SCNC: 29 MMOL/L (ref 23–29)
CREAT SERPL-MCNC: 0.9 MG/DL (ref 0.5–1.4)
DIFFERENTIAL METHOD BLD: ABNORMAL
EOSINOPHIL # BLD AUTO: 0.4 K/UL (ref 0–0.5)
EOSINOPHIL NFR BLD: 4.9 % (ref 0–8)
ERYTHROCYTE [DISTWIDTH] IN BLOOD BY AUTOMATED COUNT: 13.3 % (ref 11.5–14.5)
EST. GFR  (NO RACE VARIABLE): >60 ML/MIN/1.73 M^2
ESTIMATED AVG GLUCOSE: 223 MG/DL (ref 68–131)
GLUCOSE SERPL-MCNC: 228 MG/DL (ref 70–110)
HBA1C MFR BLD: 9.4 % (ref 4.5–6.2)
HCT VFR BLD AUTO: 40.9 % (ref 37–48.5)
HDLC SERPL-MCNC: 41 MG/DL (ref 40–75)
HDLC SERPL: 18.4 % (ref 20–50)
HGB BLD-MCNC: 13.1 G/DL (ref 12–16)
IMM GRANULOCYTES # BLD AUTO: 0.05 K/UL (ref 0–0.04)
IMM GRANULOCYTES NFR BLD AUTO: 0.6 % (ref 0–0.5)
LDLC SERPL CALC-MCNC: ABNORMAL MG/DL (ref 63–159)
LYMPHOCYTES # BLD AUTO: 3.4 K/UL (ref 1–4.8)
LYMPHOCYTES NFR BLD: 37.9 % (ref 18–48)
MCH RBC QN AUTO: 29 PG (ref 27–31)
MCHC RBC AUTO-ENTMCNC: 32 G/DL (ref 32–36)
MCV RBC AUTO: 91 FL (ref 82–98)
MONOCYTES # BLD AUTO: 0.6 K/UL (ref 0.3–1)
MONOCYTES NFR BLD: 6.7 % (ref 4–15)
NEUTROPHILS # BLD AUTO: 4.4 K/UL (ref 1.8–7.7)
NEUTROPHILS NFR BLD: 49.1 % (ref 38–73)
NONHDLC SERPL-MCNC: 182 MG/DL
NRBC BLD-RTO: 0 /100 WBC
PLATELET # BLD AUTO: 295 K/UL (ref 150–450)
PMV BLD AUTO: 9.9 FL (ref 9.2–12.9)
POTASSIUM SERPL-SCNC: 5.1 MMOL/L (ref 3.5–5.1)
PROT SERPL-MCNC: 7.7 G/DL (ref 6–8.4)
RBC # BLD AUTO: 4.52 M/UL (ref 4–5.4)
SODIUM SERPL-SCNC: 140 MMOL/L (ref 136–145)
TRIGL SERPL-MCNC: 483 MG/DL (ref 30–150)
TSH SERPL DL<=0.005 MIU/L-ACNC: 3.46 UIU/ML (ref 0.34–5.6)
WBC # BLD AUTO: 8.99 K/UL (ref 3.9–12.7)

## 2025-03-20 PROCEDURE — 80061 LIPID PANEL: CPT | Performed by: NURSE PRACTITIONER

## 2025-03-20 PROCEDURE — 36415 COLL VENOUS BLD VENIPUNCTURE: CPT | Performed by: NURSE PRACTITIONER

## 2025-03-20 PROCEDURE — 85025 COMPLETE CBC W/AUTO DIFF WBC: CPT | Performed by: NURSE PRACTITIONER

## 2025-03-20 PROCEDURE — 80053 COMPREHEN METABOLIC PANEL: CPT | Performed by: NURSE PRACTITIONER

## 2025-03-20 PROCEDURE — 83036 HEMOGLOBIN GLYCOSYLATED A1C: CPT | Performed by: NURSE PRACTITIONER

## 2025-03-20 PROCEDURE — 84443 ASSAY THYROID STIM HORMONE: CPT | Performed by: NURSE PRACTITIONER

## 2025-03-21 ENCOUNTER — TELEPHONE (OUTPATIENT)
Dept: FAMILY MEDICINE | Facility: CLINIC | Age: 50
End: 2025-03-21
Payer: MEDICAID

## 2025-03-21 DIAGNOSIS — E11.65 TYPE 2 DIABETES MELLITUS WITH HYPERGLYCEMIA, WITHOUT LONG-TERM CURRENT USE OF INSULIN: Primary | ICD-10-CM

## 2025-03-21 NOTE — TELEPHONE ENCOUNTER
Pt stopped taking the ozempic 2 mths ago.  It was making her very sick.  Then her mom passed away and she didn't watch her diet.  She would like for you to recommend something else to take.  She took the Mounjaro before but it caused belching.  I told her you are out of town and we will call her Monday.

## 2025-03-21 NOTE — TELEPHONE ENCOUNTER
----- Message from Diego Chiang NP sent at 3/20/2025 12:02 PM CDT -----  A1c is high, is she taking her meds? Metformin, ozempic, farxiga?  ----- Message -----  From: Foster, [facility]  Sent: 3/20/2025   8:45 AM CDT  To: Diego Chiang NP

## 2025-03-24 RX ORDER — DULAGLUTIDE 0.75 MG/.5ML
0.75 INJECTION, SOLUTION SUBCUTANEOUS
Qty: 4 PEN | Refills: 0 | Status: SHIPPED | OUTPATIENT
Start: 2025-03-24 | End: 2025-03-27 | Stop reason: SDUPTHER

## 2025-03-25 ENCOUNTER — TELEPHONE (OUTPATIENT)
Dept: FAMILY MEDICINE | Facility: CLINIC | Age: 50
End: 2025-03-25
Payer: MEDICAID

## 2025-03-25 DIAGNOSIS — E11.65 TYPE 2 DIABETES MELLITUS WITH HYPERGLYCEMIA, WITHOUT LONG-TERM CURRENT USE OF INSULIN: ICD-10-CM

## 2025-03-25 NOTE — TELEPHONE ENCOUNTER
----- Message from Nurse Alanis sent at 3/25/2025  8:42 AM CDT -----  Denied for Qty limits, will need new rx

## 2025-03-27 ENCOUNTER — TELEPHONE (OUTPATIENT)
Dept: FAMILY MEDICINE | Facility: CLINIC | Age: 50
End: 2025-03-27
Payer: MEDICAID

## 2025-03-27 DIAGNOSIS — E11.65 TYPE 2 DIABETES MELLITUS WITH HYPERGLYCEMIA, WITHOUT LONG-TERM CURRENT USE OF INSULIN: ICD-10-CM

## 2025-03-27 RX ORDER — DULAGLUTIDE 0.75 MG/.5ML
0.75 INJECTION, SOLUTION SUBCUTANEOUS
Qty: 4 PEN | Refills: 0 | Status: SHIPPED | OUTPATIENT
Start: 2025-03-27

## 2025-04-03 ENCOUNTER — OFFICE VISIT (OUTPATIENT)
Dept: ORTHOPEDICS | Facility: CLINIC | Age: 50
End: 2025-04-03
Payer: MEDICAID

## 2025-04-03 ENCOUNTER — PATIENT MESSAGE (OUTPATIENT)
Dept: ORTHOPEDICS | Facility: CLINIC | Age: 50
End: 2025-04-03
Payer: MEDICAID

## 2025-04-03 VITALS
HEIGHT: 64 IN | DIASTOLIC BLOOD PRESSURE: 84 MMHG | WEIGHT: 177.94 LBS | BODY MASS INDEX: 30.38 KG/M2 | SYSTOLIC BLOOD PRESSURE: 120 MMHG

## 2025-04-03 DIAGNOSIS — M17.12 PRIMARY OSTEOARTHRITIS OF LEFT KNEE: ICD-10-CM

## 2025-04-03 PROCEDURE — 4010F ACE/ARB THERAPY RXD/TAKEN: CPT | Mod: CPTII,,, | Performed by: ORTHOPAEDIC SURGERY

## 2025-04-03 PROCEDURE — 3046F HEMOGLOBIN A1C LEVEL >9.0%: CPT | Mod: CPTII,,, | Performed by: ORTHOPAEDIC SURGERY

## 2025-04-03 PROCEDURE — 3079F DIAST BP 80-89 MM HG: CPT | Mod: CPTII,,, | Performed by: ORTHOPAEDIC SURGERY

## 2025-04-03 PROCEDURE — 99214 OFFICE O/P EST MOD 30 MIN: CPT | Mod: PBBFAC,PN | Performed by: ORTHOPAEDIC SURGERY

## 2025-04-03 PROCEDURE — 99999 PR PBB SHADOW E&M-EST. PATIENT-LVL IV: CPT | Mod: PBBFAC,,, | Performed by: ORTHOPAEDIC SURGERY

## 2025-04-03 PROCEDURE — 1160F RVW MEDS BY RX/DR IN RCRD: CPT | Mod: CPTII,,, | Performed by: ORTHOPAEDIC SURGERY

## 2025-04-03 PROCEDURE — 1159F MED LIST DOCD IN RCRD: CPT | Mod: CPTII,,, | Performed by: ORTHOPAEDIC SURGERY

## 2025-04-03 PROCEDURE — 3008F BODY MASS INDEX DOCD: CPT | Mod: CPTII,,, | Performed by: ORTHOPAEDIC SURGERY

## 2025-04-03 PROCEDURE — 99213 OFFICE O/P EST LOW 20 MIN: CPT | Mod: S$PBB,,, | Performed by: ORTHOPAEDIC SURGERY

## 2025-04-03 PROCEDURE — 3074F SYST BP LT 130 MM HG: CPT | Mod: CPTII,,, | Performed by: ORTHOPAEDIC SURGERY

## 2025-04-03 NOTE — PROGRESS NOTES
[facility]    Subjective:     Chief Complaint:   Chief Complaint   Patient presents with    Left Knee - Pain     MRI results, pain is worse than before        Past Medical History:   Diagnosis Date    Anxiety     Breast cyst     4 mm cyst according to mammo, repeat test 5/2016    Diabetes mellitus, type 2     High calcium levels     Hypertension     Hypertriglyceridemia     Hypothyroidism     Umbilical hernia     since 2009, worse after giving birth       Past Surgical History:   Procedure Laterality Date    ADENOIDECTOMY  1985    HERNIA REPAIR  03/21/2016    INSERTION OF CONTRACEPTIVE CAPSULE  2012    TONSILLECTOMY  1985    TYMPANOSTOMY TUBE PLACEMENT  1985       Current Outpatient Medications   Medication Sig    atorvastatin (LIPITOR) 20 MG tablet TAKE ONE TABLET BY MOUTH ONCE DAILY    blood sugar diagnostic Strp Use 1 strip daily to check blood sugar    busPIRone (BUSPAR) 5 MG Tab TAKE ONE TABLET BY MOUTH TWICE DAILY    dapagliflozin propanediol (FARXIGA) 5 mg Tab tablet Take 1 tablet (5 mg total) by mouth once daily.    dulaglutide (TRULICITY) 0.75 mg/0.5 mL pen injector Inject 0.75 mg into the skin every 7 days.    EScitalopram oxalate (LEXAPRO) 10 MG tablet TAKE ONE TABLET BY MOUTH ONCE DAILY    estradioL (ESTRACE) 0.5 MG tablet Take 0.5 mg by mouth.    fenofibrate 160 MG Tab TAKE ONE TABLET BY MOUTH ONCE DAILY    lancing device with lancets Kit 1 Units by Misc.(Non-Drug; Combo Route) route once daily.    levothyroxine (SYNTHROID) 100 MCG tablet TAKE ONE TABLET BY MOUTH BEFORE BREAKFAST    lisinopriL (PRINIVIL,ZESTRIL) 20 MG tablet TAKE ONE TABLET BY MOUTH ONCE DAILY    metFORMIN (GLUCOPHAGE) 500 MG tablet TAKE 2 TABLETS BY MOUTH TWICE DAILY with meals    nitroGLYCERIN (NITROSTAT) 0.4 MG SL tablet Place 0.4 mg under the tongue every 5 (five) minutes as needed for Chest pain.    progesterone (PROMETRIUM) 200 MG capsule TAKE ONE CAPSULE BY MOUTH ONCE DAILY with dinner     No current  facility-administered medications for this visit.       Review of patient's allergies indicates:  No Known Allergies    Family History   Problem Relation Name Age of Onset    Hypertension Mother      Stroke Mother      Heart disease Mother      Diabetes Mother      Diabetes Father      Stroke Father      Heart disease Father      Depression Father         Social History[1]    History of present illness: 49 y.o. female,  returns to clinic today for evaluation of complaints of left knee pain.     She has had bilateral knee pain, she has got mild-to-moderate arthritic changes bilaterally.  We have injected both knees middle of 2024, we repeat an injection in the left knee due to persistent pain back in December however the Left knee is bothering her again.      We ordered an MRI I had concerns that underlying arthrosis was more severe than x-rays demonstrated.  Evaluate the meniscus.  She is here today to review those results.    Review of Systems:    Constitution: Negative for chills, fever, and sweats.  Negative for unexplained weight loss.    HENT:  Negative for headaches and blurry vision.    Cardiovascular:Negative for chest pain or irregular heart beat. Negative for hypertension.    Respiratory:  Negative for cough and shortness of breath.    Gastrointestinal: Negative for abdominal pain, heartburn, melena, nausea, and vomitting.    Genitourinary:  Negative bladder incontinence and dysuria.    Musculoskeletal:  See HPI for details.     Neurological: Negative for numbness.    Psychiatric/Behavioral: Negative for depression.  The patient is not nervous/anxious.      Endocrine: Negative for polyuria    Hematologic/Lymphatic: Negative for bleeding problem.  Does not bruise/bleed easily.    Skin: Negative for poor would healing and rash    Objective:      Physical Examination:    Vital Signs:    Vitals:    04/03/25 1213   BP: 120/84       Body mass index is 30.54 kg/m².    This a well-developed, well nourished patient  in no acute distress.  They are alert and oriented and cooperative to examination.        Examination of the left knee, skin is dry and intact, no erythema or ecchymosis, no signs symptoms of infection.  Range motion 0-120 degrees.  Stable to anterior-posterior varus and valgus stress.  Homans signs negative, straight leg raise is negative.  Distally neurovascularly intact.  Pertinent New Results:    Narrative & Impression  EXAMINATION:  MRI KNEE WITHOUT CONTRAST LEFT     CLINICAL HISTORY:  meniscus tear; Other meniscus derangements, unspecified lateral meniscus, left knee, left knee pain     TECHNIQUE:  Routine noncontrast MRI left knee protocol.     COMPARISON:  Radiographs 07/17/2024     FINDINGS:  Grade 2 signal involves the medial meniscus, with no definite meniscal tear extending to the meniscal articular surfaces.  The lateral meniscus is intact.     The ACL, PCL, MCL and LCL complex are intact, with the extensor mechanism and patellar retinacula intact.  There is medial tibiofemoral compartment chondral thinning and irregularity, including focal full-thickness chondral loss along the medial femoral condyle, with stippled T2 hyperintense subcortical marrow edema.  There are medial compartment osteophytes.     The lateral tibiofemoral and patellofemoral cartilage are normal, with a small T2 hyperintense knee joint effusion.  There is no Baker's cyst. Bone marrow signal intensity elsewhere is normal.  There is no soft tissue mass or fluid collection.  The neurovascular bundles are normal.     Impression:     1. Moderate to advanced medial tibiofemoral compartment chondromalacia, with small knee joint effusion.  2. Negative for meniscal tear.        Electronically signed by:Migue Pulido  Date:                                            12/11/2024  Time:                                           08:13        Exam Ended: 12/11/24 07:32 CST Last Resulted: 12/11/24 08:13 CST     XRAY Report / Interpretation:  "      Assessment/Plan:      Osteoarthritis left knee, based on the MRI the patient with isolated medial compartment arthrosis I think she is a candidate for unicompartmental arthroplasty use of the David robotic system.  We talked about this in great detail, the need for additional surgery later on for conversion from the unit to the total which maybe needed.  She can not do this for a couple of months so we injected the left knee today she will follow up with us in a couple of months for surgery.    Patient was consented for surgery. Risks and benefits of the procedure were discussed in great detail to include the expected preoperative, intraoperative and postoperative courses. Complications may include but are not limited to bleeding, infection, damage to nerves, arteries, blood vessels, bones, tendons, ligaments, stiffness, instability, deep vein thrombus (DVT), pulmonary embolus (PE), altered sensation, continued pain, RSD, loss of motion or a need for additional surgery. As well as general anesthetic complications including seizure, stroke, heart attack and even death.    The mobility limitation cannot be sufficiently resolved by the use of a cane. Patient's functional mobility deficit can be sufficiently resolved with the use of a (Rolling Walker or Walker). Patient's mobility limitation significantly impairs their ability to participate in one of more activities of daily living. The use of a (RW or Walker) will significantly improve the patient's ability to participate in MRADLS and the patient will use it on regular basis in the home.    Zachariah Pagan, Physician Assistant, served in the capacity as a "scribe" for this patient encounter.  A "face-to-face" encounter occurred with Dr. Arjun Jean Baptiste on this date.  The treatment plan and medical decision-making is outlined above. Patient was seen and examined with a chaperone.       This note was created using Dragon voice recognition software that occasionally " misinterpreted phrases or words.             [1]   Social History  Socioeconomic History    Marital status:    Occupational History    Occupation: business owner   Tobacco Use    Smoking status: Never     Passive exposure: Never    Smokeless tobacco: Never   Substance and Sexual Activity    Alcohol use: No    Drug use: No

## 2025-04-04 DIAGNOSIS — M17.12 PRIMARY OSTEOARTHRITIS OF LEFT KNEE: Primary | ICD-10-CM

## 2025-04-04 DIAGNOSIS — Z01.818 PRE-OP TESTING: ICD-10-CM

## 2025-04-04 RX ORDER — CEFAZOLIN SODIUM 2 G/50ML
2 SOLUTION INTRAVENOUS
OUTPATIENT
Start: 2025-04-04

## 2025-04-21 DIAGNOSIS — E78.1 HYPERTRIGLYCERIDEMIA: ICD-10-CM

## 2025-04-21 DIAGNOSIS — F41.9 ANXIETY: ICD-10-CM

## 2025-04-21 DIAGNOSIS — E78.2 MIXED HYPERLIPIDEMIA: ICD-10-CM

## 2025-04-21 DIAGNOSIS — E11.65 TYPE 2 DIABETES MELLITUS WITH HYPERGLYCEMIA, WITHOUT LONG-TERM CURRENT USE OF INSULIN: ICD-10-CM

## 2025-04-21 DIAGNOSIS — E03.8 SUBCLINICAL HYPOTHYROIDISM: ICD-10-CM

## 2025-04-21 RX ORDER — FENOFIBRATE 160 MG/1
160 TABLET ORAL
Qty: 30 TABLET | Refills: 0 | Status: SHIPPED | OUTPATIENT
Start: 2025-04-21

## 2025-04-21 RX ORDER — METFORMIN HYDROCHLORIDE 500 MG/1
1000 TABLET ORAL 2 TIMES DAILY WITH MEALS
Qty: 120 TABLET | Refills: 0 | Status: SHIPPED | OUTPATIENT
Start: 2025-04-21

## 2025-04-21 RX ORDER — ESCITALOPRAM OXALATE 10 MG/1
10 TABLET ORAL
Qty: 30 TABLET | Refills: 0 | Status: SHIPPED | OUTPATIENT
Start: 2025-04-21

## 2025-04-21 RX ORDER — BUSPIRONE HYDROCHLORIDE 5 MG/1
5 TABLET ORAL 2 TIMES DAILY
Qty: 60 TABLET | Refills: 0 | Status: SHIPPED | OUTPATIENT
Start: 2025-04-21

## 2025-04-21 RX ORDER — LEVOTHYROXINE SODIUM 100 UG/1
100 TABLET ORAL
Qty: 30 TABLET | Refills: 0 | Status: SHIPPED | OUTPATIENT
Start: 2025-04-21

## 2025-04-21 RX ORDER — ATORVASTATIN CALCIUM 20 MG/1
20 TABLET, FILM COATED ORAL
Qty: 30 TABLET | Refills: 0 | Status: SHIPPED | OUTPATIENT
Start: 2025-04-21

## 2025-05-23 ENCOUNTER — PATIENT MESSAGE (OUTPATIENT)
Dept: ADMINISTRATIVE | Facility: HOSPITAL | Age: 50
End: 2025-05-23
Payer: MEDICAID

## 2025-05-26 ENCOUNTER — HOSPITAL ENCOUNTER (OUTPATIENT)
Dept: RADIOLOGY | Facility: HOSPITAL | Age: 50
Discharge: HOME | End: 2025-05-26
Attending: ORTHOPAEDIC SURGERY
Payer: MEDICAID

## 2025-05-26 ENCOUNTER — HOSPITAL ENCOUNTER (OUTPATIENT)
Dept: PREADMISSION TESTING | Facility: HOSPITAL | Age: 50
Discharge: HOME | End: 2025-05-26
Attending: ORTHOPAEDIC SURGERY
Payer: MEDICAID

## 2025-05-26 VITALS — BODY MASS INDEX: 30.22 KG/M2 | WEIGHT: 177 LBS | HEIGHT: 64 IN

## 2025-05-26 DIAGNOSIS — M17.12 PRIMARY OSTEOARTHRITIS OF LEFT KNEE: ICD-10-CM

## 2025-05-26 DIAGNOSIS — Z01.818 PRE-OP TESTING: ICD-10-CM

## 2025-05-26 DIAGNOSIS — M17.12 PRIMARY OSTEOARTHRITIS OF LEFT KNEE: Primary | ICD-10-CM

## 2025-05-26 LAB
ABSOLUTE EOSINOPHIL (SMH): 0.45 K/UL
ABSOLUTE MONOCYTE (SMH): 0.55 K/UL (ref 0.3–1)
ABSOLUTE NEUTROPHIL COUNT (SMH): 3.4 K/UL (ref 1.8–7.7)
ALBUMIN SERPL-MCNC: 4.3 G/DL (ref 3.5–5.2)
ALP SERPL-CCNC: 33 UNIT/L (ref 40–150)
ALT SERPL-CCNC: 20 UNIT/L (ref 10–44)
ANION GAP (SMH): 11 MMOL/L (ref 8–16)
AST SERPL-CCNC: 22 UNIT/L (ref 11–45)
BASOPHILS # BLD AUTO: 0.06 K/UL
BASOPHILS NFR BLD AUTO: 0.8 %
BILIRUB SERPL-MCNC: 0.4 MG/DL (ref 0.1–1)
BUN SERPL-MCNC: 13 MG/DL (ref 6–20)
CALCIUM SERPL-MCNC: 9.2 MG/DL (ref 8.7–10.5)
CHLORIDE SERPL-SCNC: 107 MMOL/L (ref 95–110)
CO2 SERPL-SCNC: 22 MMOL/L (ref 23–29)
CREAT SERPL-MCNC: 0.6 MG/DL (ref 0.5–1.4)
ERYTHROCYTE [DISTWIDTH] IN BLOOD BY AUTOMATED COUNT: 12.7 % (ref 11.5–14.5)
GFR SERPLBLD CREATININE-BSD FMLA CKD-EPI: >60 ML/MIN/1.73/M2
GLUCOSE SERPL-MCNC: 151 MG/DL (ref 70–110)
HCT VFR BLD AUTO: 39.3 % (ref 37–48.5)
HGB BLD-MCNC: 12.7 GM/DL (ref 12–16)
IMM GRANULOCYTES # BLD AUTO: 0.02 K/UL (ref 0–0.04)
IMM GRANULOCYTES NFR BLD AUTO: 0.3 % (ref 0–0.5)
LYMPHOCYTES # BLD AUTO: 3.29 K/UL (ref 1–4.8)
MCH RBC QN AUTO: 29.3 PG (ref 27–31)
MCHC RBC AUTO-ENTMCNC: 32.3 G/DL (ref 32–36)
MCV RBC AUTO: 91 FL (ref 82–98)
NUCLEATED RBC (/100WBC) (SMH): 0 /100 WBC
PLATELET # BLD AUTO: 298 K/UL (ref 150–450)
PMV BLD AUTO: 9.6 FL (ref 9.2–12.9)
POTASSIUM SERPL-SCNC: 4.2 MMOL/L (ref 3.5–5.1)
PROT SERPL-MCNC: 7.4 GM/DL (ref 6–8.4)
RBC # BLD AUTO: 4.33 M/UL (ref 4–5.4)
RELATIVE EOSINOPHIL (SMH): 5.8 % (ref 0–8)
RELATIVE LYMPHOCYTE (SMH): 42.6 % (ref 18–48)
RELATIVE MONOCYTE (SMH): 7.1 % (ref 4–15)
RELATIVE NEUTROPHIL (SMH): 43.4 % (ref 38–73)
SODIUM SERPL-SCNC: 140 MMOL/L (ref 136–145)
WBC # BLD AUTO: 7.73 K/UL (ref 3.9–12.7)

## 2025-05-26 PROCEDURE — 93005 ELECTROCARDIOGRAM TRACING: CPT

## 2025-05-26 PROCEDURE — 36415 COLL VENOUS BLD VENIPUNCTURE: CPT | Performed by: ORTHOPAEDIC SURGERY

## 2025-05-26 PROCEDURE — 73700 CT LOWER EXTREMITY W/O DYE: CPT | Mod: TC,LT

## 2025-05-26 PROCEDURE — 85025 COMPLETE CBC W/AUTO DIFF WBC: CPT | Performed by: ORTHOPAEDIC SURGERY

## 2025-05-26 PROCEDURE — 73700 CT LOWER EXTREMITY W/O DYE: CPT | Mod: 26,LT,, | Performed by: RADIOLOGY

## 2025-05-26 PROCEDURE — 80053 COMPREHEN METABOLIC PANEL: CPT | Performed by: ORTHOPAEDIC SURGERY

## 2025-05-26 PROCEDURE — 93010 ELECTROCARDIOGRAM REPORT: CPT | Mod: ,,, | Performed by: INTERNAL MEDICINE

## 2025-05-26 NOTE — PRE ADMISSION SCREENING
Patient Name: Tsering Carlos  YOB: 1975   MRN: [id number]     [facility]   Basic Mobility Inpatient Short Form 6 Clicks         How much difficulty does the patient currently have  Unable  A Lot  A Little  None      1. Turning over in bed (including adjusting bedclothes, sheets and blankets)?     1 []    2 []    3 [x]    4 []        2. Sitting down on and standing up from a chair with arms (e.g., wheelchair, bedside commode, etc.)     1 []  2 []  3 [x]     4 []      3. Moving from lying on back to sitting on the side of the bed?     1 []  2 []  3 [x]    4 []    How much help from another person does the patient currently need  Total  A Lot  A Little  None      4. Moving to and from a bed to a chair (including a wheelchair)?    1 []  2 []  3 []    4 [x]      5. Need to walk in hospital room?    1 []  2 []  3 []    4 [x]      6. Climbing 3-5 steps with a railing?    1 []  2 []  3 []    4 [x]       Raw Score:     21             CMS 0-100% Score:    28.97        %   Standardized Score:   50.25            CMS Modifier:        CJ                                  [facility]   Basic Mobility Inpatient Short Form 6 Clicks Score Conversion Table*         *Use this form to convert -PAC Basic Mobility Inpatient Raw Scores.   [facility] Inpatient Basic Mobility Short Form Scoring Example   1. Add the number values associated with the response to each item. For example, items totals yield a Raw Score of 21.   2. Match the raw score to the t-Scale scores (t-Scale score = 50.25, SE = 4.69).   3. Find the associated CMS % (CMS % = 28.97%).   4. Locate the correct CMS Functional Modifier Code, or G Code (G code = CJ)     NOTE: Each -PAC Short Form has a separate conversion table. Make sure that you use the correct conversion table.       Instruction Manual - page 45 contains conversion table

## 2025-05-26 NOTE — DISCHARGE INSTRUCTIONS
To confirm, Your doctor has instructed you that surgery is scheduled for: 6/9/25 WITH DR. YING    Please report to [facility], Registration the morning of surgery. You must check-in and receive a wristband before going to your procedure.  [address] [place] [address] [place] [address]    Pre-Op will call the FRIDAY prior to surgery between 1:00 and 6:00 PM with the final arrival time.  Phone number: [phone]    PLEASE NOTE:  The surgery schedule has many variables which may affect the time of your surgery case.  Family members should be available if your surgery time changes.  Plan to be here the day of your procedure between 4-6 hours.    MEDICATIONS:  TAKE ONLY THESE MEDICATIONS WITH A SMALL SIP OF WATER THE MORNING OF YOUR PROCEDURE:  SEE MED LIST      DO NOT TAKE THESE MEDICATIONS 5-7 DAYS PRIOR to your procedure or per your surgeon's request:   ASPIRIN, ALEVE, ADVIL, IBUPROFEN, FISH OIL VITAMIN E, HERBALS  (May take Tylenol)    ONLY if you are prescribed any types of blood thinners such as:  Aspirin, Coumadin, Plavix, Pradaxa, Xarelto, Aggrenox, Effient, Eliquis, Savasya, Brilinta, or any other, ask your surgeon whether you should stop taking them and how long before surgery you should stop.  You may also need to verify with the prescribing physician if it is ok to stop your medication.      INSTRUCTIONS IMPORTANT!!  Do not eat or drink anything between midnight and the time of your procedure- this includes gum, mints, and candy.  EXCEPT: you may have clear liquids such as:  WATER, BLACK COFFEE, UNSWEET TEA, OR GATORADE (NO RED OR PURPLE) UP TO 2 HOURS PRIOR TO YOUR ARRIVAL TIME.  Do not smoke or drink alcoholic beverages 24 hours prior to your procedure.  Shower the night before AND the morning of your procedure with a Chlorhexidine wash such as Hibiclens or Dial antibacterial soap from the neck down.  Do not get it on your face or in your eyes.  You may use your own shampoo and face wash.  This helps your skin to be as bacteria free as possible.    If you wear contact lenses, dentures, hearing aids or glasses, bring a container to put them in during surgery and give to a family member for safe keeping.  Please leave all jewelry, piercing's and valuables at home. You must remove your false eyelashes prior to surgery.    DO NOT remove hair from the surgery site.  Do not shave the incision site unless you are given specific instructions to do so.    ONLY if you have been diagnosed with sleep apnea please bring your C-PAP machine.  ONLY if you wear home oxygen please bring your portable oxygen tank the day of your procedure.  ONLY if you have a history of OPEN HEART SURGERY you will need a clearance from your Cardiologist per Anesthesia.      ONLY for patients requiring bowel prep, written instructions will be given by your doctor's office.  ONLY if you have any type of stimulator implant or any type of implanted device with a remote control.  Please bring the controller with you the morning of surgery  If your doctor has scheduled you for an overnight stay, bring a small overnight bag with any personal items you need.  Make arrangements in advance for transportation home by a responsible adult. You can not go home in an uber or a cab per hospital policy.  It is not safe to drive a vehicle during the 24 hours after anesthesia.          All  facilities and properties are tobacco free.  Smoking is NOT allowed.   If you have any questions about these instructions, call Pre-Op Admit  Nursing at [phone] or the Pre-Op Day Surgery Unit at [phone].

## 2025-05-26 NOTE — PRE ADMISSION SCREENING
JOINT CAMP ASSESSMENT    Name Tsering Carlos   MRN [id number]    Age/Sex 50 y.o. female    Surgeon Dr. Díaz Finger   Joint Camp Date 5/26/2025   Surgery Date 6/9/2025   Procedure Left Knee Unicompartmental Arthroplasty   Insurance Payor: MEDICAID / Plan: [facility] PLAN [facility] [employer] ([place] MEDICAID) / Product Type: Managed Medicaid /    Care Team Patient Care Team:  Diego Chiang NP as PCP - General (Family Medicine)  Rosa Philip MD as Consulting Physician (Obstetrics)    Pharmacy   [facility] - [place], [place] - [address]  [id number]  [place] [address]  Phone: [phone] Fax: [phone]     AM-PAC Score   21   Risk Assessment Score 11     Past Medical History:   Diagnosis Date    Anxiety     Breast cyst     4 mm cyst according to mammo, repeat test 5/2016    Diabetes mellitus, type 2     High calcium levels     Hypertension     Hypertriglyceridemia     Hypothyroidism     Umbilical hernia     since 2009, worse after giving birth       Past Surgical History:   Procedure Laterality Date    ADENOIDECTOMY  1985    HERNIA REPAIR  03/21/2016    INSERTION OF CONTRACEPTIVE CAPSULE  2012    TONSILLECTOMY  1985    TYMPANOSTOMY TUBE PLACEMENT  1985         Home Enviroment     Living Arrangement: Lives with family  Home Environment: 1-story house/ trailer, number of outside stairs: 1, tub-shower  Home Safety Concerns: Pets in the home: dogs (1).    DISCHARGE CAREGIVER/SUPPORT SYSTEM     Identified post-op caregiver: Patient has children / family / friends to help, daughter, Abbie Carlos.  Patient's caregiver(s) will be able to provide physical assistance. Patient will have someone to assist overnight.      Caregiver present at pre-op interview:  Yes      PRE-OPERATIVE FUNCTIONAL STATUS     Employment: Employed full time    Pre-op Functional Status: Patient is independent with mobility/ambulation, transfers, ADL's, IADL's.    Use of assistive device for ambulation: none  ADL: self care  ADL  Limitations: difficulty with walking  Medical Restrictions: Unstable ambulation and Decreased range of motions in extremities    POTENTIAL BARRIERS TO DISCHARGE/POTENTIAL POST-OP COMPLICATIONS     Patient with hx of type II diabetes with HgbA1c = 9.4 as of 03/20/2025, HTN. POSSIBLE SAME DAY DISCHARGE.    DISCHARGE PLAN     Expected LOS of 1 days or less for joint replacement discussed with patient.     Patient in agreement with discharge plan: Yes    Discharge to: Outpatient PT and Home with 24 hour assistance     HH:  None per insurance coverage.      OP PT: [facility] ([facility])     Home DME: bedside commode and tub transfer bench    Needed DME at D/C: rolling walker     Rx: Per Dr. Jean Baptiste at discharge     Meds to Beds: Yes  Patient expected to discharge on Aspirin 81mg by mouth twice daily for DVT prophylaxis.

## 2025-05-26 NOTE — PRE ADMISSION SCREENING
"               [facility] Risk Assessment Scale    Patient Name: Tsering Carlos  YOB: 1975  MRN: [id number]            RIsk Factor Measure Recommendation Patient Data Scale/Score   BMI >40 Reconsider surgery, weight loss   Estimated body mass index is 30.38 kg/m² as calculated from the following:    Height as of this encounter: 5' 4" (1.626 m).    Weight as of this encounter: 80.3 kg (177 lb).   [] 0 = 1 - 24.9  [] 1 = 25-29.9  [x] 2 = 30-34.9  [] 3 = 35-39.9  [] 4 = 40-44.9  [] 5 = 45-99.9   Hemoglobin AIC (if applicable) >9 Delay surgery until DM under control  Refer for:  Nutrition Therapy  Exercise   Medication    Lab Results   Component Value Date    HGBA1C 9.4 (H) 03/20/2025       Lab Results   Component Value Date     (H) 05/26/2025      [] 0 = 4.0-5.6  [] 1 = 5.7-6.4  [] 2 = 6.5-6.9  [] 3 = 7.0-7.9  [] 4 = 8.0-8.9  [x] 5 = 9.0-12   Hemoglobin (Anemia) <9 Delay surgery   Correct anemia Lab Results   Component Value Date    HGB 12.7 05/26/2025    [] 20 - <9.0                    Albumin <3 Delay surgery &Workup Lab Results   Component Value Date    ALBUMIN 4.3 05/26/2025    [] 20 - <3.0   Smoking Cessation >4 Weeks Delay Surgery  Refer to OP Cessation Class    Never Smoker [] 20 - current smoker                                _____ PPD                    Hx of MI, PE, Arrhythmia, CVA, DVT <30 Days Delay Surgery    N/A [] 20      Infection Variable Delay surgery and re-evaluate   N/A [] 20 - recent/current infection     Depression (PHQ) >10 out of 27 Delay Surgery and re-evaluate  Medication  Counseling              [x] 0     []1     []2     []3      []4      [] 5                    (1-4)      (5-9)  (10-14)  (15-19)   (20-27)     Memory Impairment & Memory loss (Mini-Cog Screening Tool) Advanced dementia and/or Parkinson's Reconsider surgery     [x] 0     []1     []2     []3     []4     [] 5     Physical Conditioning (Modified AM-PAC Per Physical Therapy at [facility]) Unable to ambulate on day " of surgery Delay surgery and re-evaluate  Pre-Rehabilitation   (PT evaluation)       []  0   [x]4       []8     []12        []16     []20       (<20%)   (<40%)   (<60%)   (<80% )    (>80%)     Home Environment/Caregiver support  (Per /Navigator Interview)    Availability of basic services and/or approprate assistance during post-operative period Delay surgery and re-evaluate  Safe home environment  Health   1 week post-surgery  Transportation  availability  Ability to obtain DME/Medications post-op    [x] 0     []1     []2     []3     []4     [] 5  [x] 0     []1     []2     []3     []4     [] 5  [x] 0     []1     []2     []3     []4     [] 5  [x] 0     []1     []2     []3     []4     [] 5         MD Contact: Dr. Jean Baptiste Comments:  Total Score:  11

## 2025-05-27 DIAGNOSIS — M17.12 PRIMARY OSTEOARTHRITIS OF LEFT KNEE: Primary | ICD-10-CM

## 2025-05-28 LAB
OHS QRS DURATION: 92 MS
OHS QTC CALCULATION: 434 MS

## 2025-06-04 DIAGNOSIS — M17.12 PRIMARY OSTEOARTHRITIS OF LEFT KNEE: Primary | ICD-10-CM

## 2025-06-04 RX ORDER — GABAPENTIN 300 MG/1
300 CAPSULE ORAL 2 TIMES DAILY
Qty: 60 CAPSULE | Refills: 0 | Status: SHIPPED | OUTPATIENT
Start: 2025-06-04 | End: 2025-07-06

## 2025-06-04 RX ORDER — CELECOXIB 200 MG/1
200 CAPSULE ORAL 2 TIMES DAILY
Qty: 60 CAPSULE | Refills: 0 | Status: SHIPPED | OUTPATIENT
Start: 2025-06-04 | End: 2025-07-06

## 2025-06-04 RX ORDER — ONDANSETRON 4 MG/1
4 TABLET, FILM COATED ORAL EVERY 6 HOURS PRN
Qty: 10 TABLET | Refills: 0 | Status: SHIPPED | OUTPATIENT
Start: 2025-06-04

## 2025-06-04 RX ORDER — ASPIRIN 81 MG/1
81 TABLET ORAL EVERY 12 HOURS
Qty: 60 TABLET | Refills: 0 | Status: SHIPPED | OUTPATIENT
Start: 2025-06-04 | End: 2025-07-06

## 2025-06-04 RX ORDER — OXYCODONE AND ACETAMINOPHEN 7.5; 325 MG/1; MG/1
1 TABLET ORAL EVERY 6 HOURS PRN
Qty: 28 TABLET | Refills: 0 | Status: SHIPPED | OUTPATIENT
Start: 2025-06-04

## 2025-06-04 RX ORDER — DOCUSATE SODIUM 100 MG/1
100 CAPSULE, LIQUID FILLED ORAL 2 TIMES DAILY
Qty: 60 CAPSULE | Refills: 0 | Status: SHIPPED | OUTPATIENT
Start: 2025-06-04 | End: 2025-07-05

## 2025-06-08 ENCOUNTER — ANESTHESIA EVENT (OUTPATIENT)
Dept: SURGERY | Facility: HOSPITAL | Age: 50
End: 2025-06-08
Payer: MEDICAID

## 2025-06-09 ENCOUNTER — ANESTHESIA (OUTPATIENT)
Dept: SURGERY | Facility: HOSPITAL | Age: 50
End: 2025-06-09
Payer: MEDICAID

## 2025-06-09 ENCOUNTER — HOSPITAL ENCOUNTER (OUTPATIENT)
Facility: HOSPITAL | Age: 50
Discharge: HOME | End: 2025-06-09
Attending: ORTHOPAEDIC SURGERY | Admitting: ORTHOPAEDIC SURGERY
Payer: MEDICAID

## 2025-06-09 DIAGNOSIS — M17.12 PRIMARY OSTEOARTHRITIS OF LEFT KNEE: Primary | ICD-10-CM

## 2025-06-09 DIAGNOSIS — Z01.818 PREOP TESTING: ICD-10-CM

## 2025-06-09 DIAGNOSIS — R68.89 DIFFICULTY MAINTAINING SQUATTING POSITION: ICD-10-CM

## 2025-06-09 DIAGNOSIS — Z01.818 PRE-OP TESTING: ICD-10-CM

## 2025-06-09 LAB
B-HCG UR QL: NEGATIVE
CTP QC/QA: YES
POCT GLUCOSE: 182 MG/DL (ref 70–110)

## 2025-06-09 PROCEDURE — 71000015 HC POSTOP RECOV 1ST HR: Performed by: ORTHOPAEDIC SURGERY

## 2025-06-09 PROCEDURE — 0055T BONE SRGRY CMPTR CT/MRI IMAG: CPT | Mod: ,,, | Performed by: ORTHOPAEDIC SURGERY

## 2025-06-09 PROCEDURE — 97116 GAIT TRAINING THERAPY: CPT

## 2025-06-09 PROCEDURE — 97161 PT EVAL LOW COMPLEX 20 MIN: CPT

## 2025-06-09 PROCEDURE — 63600175 PHARM REV CODE 636 W HCPCS: Performed by: NURSE ANESTHETIST, CERTIFIED REGISTERED

## 2025-06-09 PROCEDURE — 63600175 PHARM REV CODE 636 W HCPCS: Performed by: ORTHOPAEDIC SURGERY

## 2025-06-09 PROCEDURE — 25000003 PHARM REV CODE 250: Performed by: ORTHOPAEDIC SURGERY

## 2025-06-09 PROCEDURE — 81025 URINE PREGNANCY TEST: CPT | Performed by: ANESTHESIOLOGY

## 2025-06-09 PROCEDURE — 27200750 HC INSULATED NEEDLE/ STIMUPLEX: Performed by: ANESTHESIOLOGY

## 2025-06-09 PROCEDURE — 25000003 PHARM REV CODE 250: Performed by: ANESTHESIOLOGY

## 2025-06-09 PROCEDURE — 97110 THERAPEUTIC EXERCISES: CPT

## 2025-06-09 PROCEDURE — 63600175 PHARM REV CODE 636 W HCPCS

## 2025-06-09 PROCEDURE — C1713 ANCHOR/SCREW BN/BN,TIS/BN: HCPCS | Performed by: ORTHOPAEDIC SURGERY

## 2025-06-09 PROCEDURE — 94799 UNLISTED PULMONARY SVC/PX: CPT

## 2025-06-09 PROCEDURE — 27200688 HC TRAY, SPINAL-HYPER/ ISOBARIC: Performed by: ANESTHESIOLOGY

## 2025-06-09 PROCEDURE — 71000033 HC RECOVERY, INTIAL HOUR: Performed by: ORTHOPAEDIC SURGERY

## 2025-06-09 PROCEDURE — 37000009 HC ANESTHESIA EA ADD 15 MINS: Performed by: ORTHOPAEDIC SURGERY

## 2025-06-09 PROCEDURE — C1776 JOINT DEVICE (IMPLANTABLE): HCPCS | Performed by: ORTHOPAEDIC SURGERY

## 2025-06-09 PROCEDURE — 64447 NJX AA&/STRD FEMORAL NRV IMG: CPT | Performed by: ANESTHESIOLOGY

## 2025-06-09 PROCEDURE — 36000711: Performed by: ORTHOPAEDIC SURGERY

## 2025-06-09 PROCEDURE — 36000710: Performed by: ORTHOPAEDIC SURGERY

## 2025-06-09 PROCEDURE — 71000039 HC RECOVERY, EACH ADD'L HOUR: Performed by: ORTHOPAEDIC SURGERY

## 2025-06-09 PROCEDURE — 63600175 PHARM REV CODE 636 W HCPCS: Mod: JZ,TB | Performed by: ANESTHESIOLOGY

## 2025-06-09 PROCEDURE — 27446 REVISION OF KNEE JOINT: CPT | Mod: LT,,, | Performed by: ORTHOPAEDIC SURGERY

## 2025-06-09 PROCEDURE — 71000016 HC POSTOP RECOV ADDL HR: Performed by: ORTHOPAEDIC SURGERY

## 2025-06-09 PROCEDURE — 37000008 HC ANESTHESIA 1ST 15 MINUTES: Performed by: ORTHOPAEDIC SURGERY

## 2025-06-09 PROCEDURE — 99900031 HC PATIENT EDUCATION (STAT)

## 2025-06-09 PROCEDURE — C1769 GUIDE WIRE: HCPCS | Performed by: ORTHOPAEDIC SURGERY

## 2025-06-09 PROCEDURE — 27201423 OPTIME MED/SURG SUP & DEVICES STERILE SUPPLY: Performed by: ORTHOPAEDIC SURGERY

## 2025-06-09 DEVICE — MCK ONLAY TIBIAL INSERT
Type: IMPLANTABLE DEVICE | Site: KNEE | Status: FUNCTIONAL
Brand: RESTORIS

## 2025-06-09 DEVICE — MCK ONLAY TIBIA BASEPLATE
Type: IMPLANTABLE DEVICE | Site: KNEE | Status: FUNCTIONAL
Brand: RESTORIS

## 2025-06-09 DEVICE — MCK FEMORAL COMPONENT
Type: IMPLANTABLE DEVICE | Site: KNEE | Status: FUNCTIONAL
Brand: RESTORIS

## 2025-06-09 DEVICE — SIMPLEX® HV IS A FAST-SETTING ACRYLIC RESIN FOR USE IN BONE SURGERY. MIXING THE TWO SEPARATE STERILE COMPONENTS PRODUCES A DUCTILE BONE CEMENT WHICH, AFTER HARDENING, FIXES THE IMPLANT AND TRANSFERS STRESSES PRODUCED DURING MOVEMENT EVENLY TO THE BONE. SIMPLEX® HV CEMENT POWDER ALSO CONTAINS INSOLUBLE ZIRCONIUM DIOXIDE AS AN X-RAY CONTRAST MEDIUM. SIMPLEX® HV DOES NOT EMIT A SIGNAL AND DOES NOT POSE A SAFETY RISK IN A MAGNETIC RESONANCE ENVIRONMENT.
Type: IMPLANTABLE DEVICE | Site: KNEE | Status: FUNCTIONAL
Brand: SIMPLEX HV

## 2025-06-09 RX ORDER — MIDAZOLAM HYDROCHLORIDE 1 MG/ML
INJECTION INTRAMUSCULAR; INTRAVENOUS
Status: DISCONTINUED | OUTPATIENT
Start: 2025-06-09 | End: 2025-06-09

## 2025-06-09 RX ORDER — OXYCODONE HCL 10 MG/1
10 TABLET, FILM COATED, EXTENDED RELEASE ORAL ONCE
Status: COMPLETED | OUTPATIENT
Start: 2025-06-09 | End: 2025-06-09

## 2025-06-09 RX ORDER — ONDANSETRON HYDROCHLORIDE 2 MG/ML
INJECTION, SOLUTION INTRAVENOUS
Status: DISCONTINUED | OUTPATIENT
Start: 2025-06-09 | End: 2025-06-09

## 2025-06-09 RX ORDER — ZOLPIDEM TARTRATE 5 MG/1
5 TABLET ORAL NIGHTLY PRN
Status: CANCELLED | OUTPATIENT
Start: 2025-06-09

## 2025-06-09 RX ORDER — LIDOCAINE HYDROCHLORIDE 20 MG/ML
INJECTION INTRAVENOUS
Status: DISCONTINUED | OUTPATIENT
Start: 2025-06-09 | End: 2025-06-09

## 2025-06-09 RX ORDER — BUPIVACAINE 13.3 MG/ML
INJECTION, SUSPENSION, LIPOSOMAL INFILTRATION
Status: COMPLETED | OUTPATIENT
Start: 2025-06-09 | End: 2025-06-09

## 2025-06-09 RX ORDER — BUPIVACAINE HYDROCHLORIDE 5 MG/ML
INJECTION, SOLUTION EPIDURAL; INTRACAUDAL; PERINEURAL
Status: COMPLETED | OUTPATIENT
Start: 2025-06-09 | End: 2025-06-09

## 2025-06-09 RX ORDER — FAMOTIDINE 20 MG/1
20 TABLET, FILM COATED ORAL 2 TIMES DAILY
Status: CANCELLED | OUTPATIENT
Start: 2025-06-09

## 2025-06-09 RX ORDER — FENTANYL CITRATE 50 UG/ML
25 INJECTION, SOLUTION INTRAMUSCULAR; INTRAVENOUS EVERY 5 MIN PRN
Status: DISCONTINUED | OUTPATIENT
Start: 2025-06-09 | End: 2025-06-09 | Stop reason: HOSPADM

## 2025-06-09 RX ORDER — FENTANYL CITRATE 50 UG/ML
INJECTION, SOLUTION INTRAMUSCULAR; INTRAVENOUS
Status: DISCONTINUED | OUTPATIENT
Start: 2025-06-09 | End: 2025-06-09

## 2025-06-09 RX ORDER — ONDANSETRON 4 MG/1
8 TABLET, ORALLY DISINTEGRATING ORAL EVERY 8 HOURS PRN
Status: CANCELLED | OUTPATIENT
Start: 2025-06-09

## 2025-06-09 RX ORDER — PHENYLEPHRINE HYDROCHLORIDE 10 MG/ML
INJECTION INTRAVENOUS
Status: DISCONTINUED | OUTPATIENT
Start: 2025-06-09 | End: 2025-06-09

## 2025-06-09 RX ORDER — CEFAZOLIN 2 G/1
2 INJECTION, POWDER, FOR SOLUTION INTRAMUSCULAR; INTRAVENOUS
Status: COMPLETED | OUTPATIENT
Start: 2025-06-09 | End: 2025-06-09

## 2025-06-09 RX ORDER — BUPIVACAINE HYDROCHLORIDE 7.5 MG/ML
INJECTION, SOLUTION EPIDURAL; RETROBULBAR
Status: COMPLETED | OUTPATIENT
Start: 2025-06-09 | End: 2025-06-09

## 2025-06-09 RX ORDER — METOCLOPRAMIDE HYDROCHLORIDE 5 MG/ML
10 INJECTION INTRAMUSCULAR; INTRAVENOUS EVERY 10 MIN PRN
Status: DISCONTINUED | OUTPATIENT
Start: 2025-06-09 | End: 2025-06-09 | Stop reason: HOSPADM

## 2025-06-09 RX ORDER — PROPOFOL 10 MG/ML
VIAL (ML) INTRAVENOUS CONTINUOUS PRN
Status: DISCONTINUED | OUTPATIENT
Start: 2025-06-09 | End: 2025-06-09

## 2025-06-09 RX ORDER — CELECOXIB 100 MG/1
400 CAPSULE ORAL ONCE
Status: COMPLETED | OUTPATIENT
Start: 2025-06-09 | End: 2025-06-09

## 2025-06-09 RX ORDER — POLYETHYLENE GLYCOL 3350 17 G/17G
17 POWDER, FOR SOLUTION ORAL DAILY
Status: CANCELLED | OUTPATIENT
Start: 2025-06-09

## 2025-06-09 RX ORDER — LIDOCAINE HYDROCHLORIDE 10 MG/ML
1 INJECTION, SOLUTION EPIDURAL; INFILTRATION; INTRACAUDAL; PERINEURAL ONCE
Status: DISCONTINUED | OUTPATIENT
Start: 2025-06-09 | End: 2025-06-09 | Stop reason: HOSPADM

## 2025-06-09 RX ORDER — CEFAZOLIN 2 G/1
2 INJECTION, POWDER, FOR SOLUTION INTRAMUSCULAR; INTRAVENOUS
Status: CANCELLED | OUTPATIENT
Start: 2025-06-09 | End: 2025-06-10

## 2025-06-09 RX ORDER — OXYCODONE HYDROCHLORIDE 5 MG/1
5 TABLET ORAL
Status: DISCONTINUED | OUTPATIENT
Start: 2025-06-09 | End: 2025-06-09 | Stop reason: HOSPADM

## 2025-06-09 RX ORDER — HYDROMORPHONE HYDROCHLORIDE 2 MG/ML
0.2 INJECTION, SOLUTION INTRAMUSCULAR; INTRAVENOUS; SUBCUTANEOUS EVERY 5 MIN PRN
Status: DISCONTINUED | OUTPATIENT
Start: 2025-06-09 | End: 2025-06-09 | Stop reason: HOSPADM

## 2025-06-09 RX ORDER — CELECOXIB 100 MG/1
200 CAPSULE ORAL 2 TIMES DAILY
Status: CANCELLED | OUTPATIENT
Start: 2025-06-09

## 2025-06-09 RX ORDER — SODIUM CHLORIDE 0.9 % (FLUSH) 0.9 %
5 SYRINGE (ML) INJECTION
Status: DISCONTINUED | OUTPATIENT
Start: 2025-06-09 | End: 2025-06-09 | Stop reason: HOSPADM

## 2025-06-09 RX ORDER — BISACODYL 10 MG/1
10 SUPPOSITORY RECTAL DAILY
Status: CANCELLED | OUTPATIENT
Start: 2025-06-09

## 2025-06-09 RX ORDER — MORPHINE SULFATE 2 MG/ML
2 INJECTION, SOLUTION INTRAMUSCULAR; INTRAVENOUS EVERY 4 HOURS PRN
Refills: 0 | Status: CANCELLED | OUTPATIENT
Start: 2025-06-09

## 2025-06-09 RX ORDER — ACETAMINOPHEN 500 MG
1000 TABLET ORAL
Status: COMPLETED | OUTPATIENT
Start: 2025-06-09 | End: 2025-06-09

## 2025-06-09 RX ORDER — DEXAMETHASONE SODIUM PHOSPHATE 4 MG/ML
INJECTION, SOLUTION INTRA-ARTICULAR; INTRALESIONAL; INTRAMUSCULAR; INTRAVENOUS; SOFT TISSUE
Status: DISCONTINUED | OUTPATIENT
Start: 2025-06-09 | End: 2025-06-09

## 2025-06-09 RX ORDER — OXYCODONE HYDROCHLORIDE 10 MG/1
10 TABLET ORAL EVERY 4 HOURS PRN
Refills: 0 | Status: CANCELLED | OUTPATIENT
Start: 2025-06-09

## 2025-06-09 RX ADMIN — PHENYLEPHRINE HYDROCHLORIDE 200 MCG: 10 INJECTION INTRAVENOUS at 11:06

## 2025-06-09 RX ADMIN — PROPOFOL 50 MCG/KG/MIN: 10 INJECTION, EMULSION INTRAVENOUS at 11:06

## 2025-06-09 RX ADMIN — KETOROLAC TROMETHAMINE: 30 INJECTION, SOLUTION INTRAMUSCULAR; INTRAVENOUS at 11:06

## 2025-06-09 RX ADMIN — CEFAZOLIN 2 G: 2 INJECTION, POWDER, FOR SOLUTION INTRAMUSCULAR; INTRAVENOUS at 11:06

## 2025-06-09 RX ADMIN — PHENYLEPHRINE HYDROCHLORIDE 200 MCG: 10 INJECTION INTRAVENOUS at 12:06

## 2025-06-09 RX ADMIN — FENTANYL CITRATE 50 MCG: 50 INJECTION, SOLUTION INTRAMUSCULAR; INTRAVENOUS at 10:06

## 2025-06-09 RX ADMIN — CELECOXIB 400 MG: 100 CAPSULE ORAL at 09:06

## 2025-06-09 RX ADMIN — OXYCODONE HYDROCHLORIDE 10 MG: 10 TABLET, FILM COATED, EXTENDED RELEASE ORAL at 09:06

## 2025-06-09 RX ADMIN — BUPIVACAINE HYDROCHLORIDE 1.3 ML: 7.5 INJECTION, SOLUTION EPIDURAL; RETROBULBAR at 11:06

## 2025-06-09 RX ADMIN — TRANEXAMIC ACID 1000 MG: 100 INJECTION, SOLUTION INTRAVENOUS at 11:06

## 2025-06-09 RX ADMIN — BUPIVACAINE 20 ML: 13.3 INJECTION, SUSPENSION, LIPOSOMAL INFILTRATION at 10:06

## 2025-06-09 RX ADMIN — SODIUM CHLORIDE, SODIUM GLUCONATE, SODIUM ACETATE, POTASSIUM CHLORIDE AND MAGNESIUM CHLORIDE: 526; 502; 368; 37; 30 INJECTION, SOLUTION INTRAVENOUS at 11:06

## 2025-06-09 RX ADMIN — ONDANSETRON 4 MG: 2 INJECTION INTRAMUSCULAR; INTRAVENOUS at 11:06

## 2025-06-09 RX ADMIN — SODIUM CHLORIDE, SODIUM GLUCONATE, SODIUM ACETATE, POTASSIUM CHLORIDE AND MAGNESIUM CHLORIDE: 526; 502; 368; 37; 30 INJECTION, SOLUTION INTRAVENOUS at 12:06

## 2025-06-09 RX ADMIN — LIDOCAINE HYDROCHLORIDE 100 MG: 20 INJECTION, SOLUTION INTRAVENOUS at 11:06

## 2025-06-09 RX ADMIN — MIDAZOLAM HYDROCHLORIDE 2 MG: 1 INJECTION, SOLUTION INTRAMUSCULAR; INTRAVENOUS at 10:06

## 2025-06-09 RX ADMIN — ACETAMINOPHEN 1000 MG: 500 TABLET ORAL at 09:06

## 2025-06-09 RX ADMIN — DEXAMETHASONE SODIUM PHOSPHATE 4 MG: 4 INJECTION, SOLUTION INTRA-ARTICULAR; INTRALESIONAL; INTRAMUSCULAR; INTRAVENOUS; SOFT TISSUE at 11:06

## 2025-06-09 RX ADMIN — PHENYLEPHRINE HYDROCHLORIDE 100 MCG: 10 INJECTION INTRAVENOUS at 11:06

## 2025-06-09 RX ADMIN — SODIUM CHLORIDE, SODIUM GLUCONATE, SODIUM ACETATE, POTASSIUM CHLORIDE AND MAGNESIUM CHLORIDE: 526; 502; 368; 37; 30 INJECTION, SOLUTION INTRAVENOUS at 09:06

## 2025-06-09 RX ADMIN — BUPIVACAINE HYDROCHLORIDE 10 ML: 5 INJECTION, SOLUTION EPIDURAL; INTRACAUDAL; PERINEURAL at 10:06

## 2025-06-09 RX ADMIN — MIDAZOLAM HYDROCHLORIDE 2 MG: 1 INJECTION, SOLUTION INTRAMUSCULAR; INTRAVENOUS at 11:06

## 2025-06-09 NOTE — PLAN OF CARE
Per JINA Fuentes RN note, patient not to have home health and patient to start outpatient PT tomorrow--see AVS for appointment. [facility] home health rep contacted patient's daughter prior to discharge to schedule time to see patient tomorrow, and stated patient's home health was fully covered. Home health rep states they will verify patient's coverage and contact patient's daughter tomorrow morning to update them and schedule a time to see patient tomorrow. Contacted JINA Fuentes RN, and was told that patient's insurance will cover her to have home health. Updated patient and patient's family. Patient reports she prefers to have home health. Educated patient and patient's family to wait until they get verification tomorrow morning on home health coverage prior to rescheduling the outpatient PT appointment, and to contact home health office or notify Dr. Jean Baptiste's office with any further questions. Patient and patient's family verbalized understanding.

## 2025-06-09 NOTE — PLAN OF CARE
Released per anesthesia, when criteria met. VS WDL, Resp even and unlabored. IS reviewed and pt encouraged to continue independently. Dressing to left leg dry and intact, denies pain and nausea and has tolerated sips of water. Moving all extremities and will be due to void.

## 2025-06-09 NOTE — ANESTHESIA PROCEDURE NOTES
Spinal    Diagnosis: knee pain left  Patient location during procedure: OR  Start time: 6/9/2025 11:10 AM  Timeout: 6/9/2025 11:09 AM  End time: 6/9/2025 11:15 AM    Staffing  Authorizing Provider: Tomas Ross MD  Performing Provider: Tomas Ross MD    Staffing  Performed by: Tomas Ross MD  Authorized by: Tomas Ross MD    Preanesthetic Checklist  Completed: patient identified, IV checked, site marked, risks and benefits discussed, surgical consent, monitors and equipment checked, pre-op evaluation and timeout performed  Spinal Block  Patient position: sitting  Prep: ChloraPrep  Patient monitoring: cardiac monitor, continuous pulse ox and continuous capnometry  Approach: midline  Location: L3-4  Injection technique: single shot  CSF Fluid: clear free-flowing CSF  Needle  Needle type: pencil-tip   Needle gauge: 25 G  Needle length: 3.5 in  Needle localization: anatomical landmarks  Assessment  Ease of block: easy  Medications:    Medications: bupivacaine (pf) (MARCAINE) injection 0.75% - Intraspinal   1.3 mL - 6/9/2025 11:10:00 AM

## 2025-06-09 NOTE — ANESTHESIA PREPROCEDURE EVALUATION
06/09/2025  Tsering Carlos is a 50 y.o., female.      Pre-op Assessment    I have reviewed the Patient Summary Reports.    I have reviewed the NPO Status.   I have reviewed the Medications.     Review of Systems  Anesthesia Hx:  No problems with previous Anesthesia                Cardiovascular:     Hypertension           hyperlipidemia                         Hypertension         Pulmonary:  Pulmonary Normal                       Neurological:  Neurology Normal                                      Endocrine:  Diabetes Hypothyroidism   Diabetes                   Hypothyroidism        Obesity / BMI > 30  Psych:   anxiety                 Physical Exam  General: Well nourished    Airway:  Mallampati: II   Mouth Opening: Normal  TM Distance: Normal  Neck ROM: Normal ROM        Anesthesia Plan  Type of Anesthesia, risks & benefits discussed:    Anesthesia Type: Spinal  Intra-op Monitoring Plan: Standard ASA Monitors  Post Op Pain Control Plan: multimodal analgesia and peripheral nerve block  Informed Consent: Informed consent signed with the Patient and all parties understand the risks and agree with anesthesia plan.  All questions answered.   ASA Score: 2    Ready For Surgery From Anesthesia Perspective.     .

## 2025-06-09 NOTE — PLAN OF CARE
Patient awake, alert, oriented. Vital signs stable. Patient verbalizes readiness for discharge. Patient cleared for discharge by PT. Patient reports having all necessary DME. Discharge instructions reviewed with patient and patient's family. Patient and patient's family verbalized understanding. IV removed, catheter intact. Dressing to LLE clean, dry, and intact. All belongings returned to patient. Patient transferred to car via wheelchair. Safety maintained.

## 2025-06-09 NOTE — TRANSFER OF CARE
"Anesthesia Transfer of Care Note    Patient: Tsering Carlos    Procedure(s) Performed: Procedure(s) (LRB):  ARTHROPLASTY, KNEE, UNICOMPARTMENTAL (Left)    Patient location: PACU    Anesthesia Type: spinal and MAC    Transport from OR: Transported from OR on 2-3 L/min O2 by NC with adequate spontaneous ventilation    Post pain: adequate analgesia    Post assessment: no apparent anesthetic complications    Post vital signs: stable    Level of consciousness: awake    Nausea/Vomiting: no nausea/vomiting    Complications: none    Transfer of care protocol was followed      Last vitals: Visit Vitals  /77   Pulse 89   Temp 36.8 °C (98.2 °F) (Skin)   Resp 15   Ht 5' 4" (1.626 m)   Wt 80.7 kg (178 lb)   SpO2 96%   Breastfeeding No   BMI 30.55 kg/m²     "

## 2025-06-09 NOTE — PLAN OF CARE
PT at bedside.      1550--Patient up to chair from stretcher per PT.      1555--Patient ambulating in hallway per PT.

## 2025-06-09 NOTE — PLAN OF CARE
Patient prepared for surgery. Surgical and anesthesia consents signed. Patient belongings in preop. Text message notifications set up with daughter. Call light within reach, bed in lowest position.

## 2025-06-09 NOTE — PT/OT/SLP EVAL
Physical Therapy Evaluation and Discharge Note    Patient Name:  Tsering Carlos   MRN:  [id number]    Recommendations:     Discharge Recommendations: Low Intensity Therapy  Discharge Equipment Recommendations: none   Barriers to discharge: None    Assessment:     Tsering Carlos is a 50 y.o. female admitted with a medical diagnosis of <principal problem not specified>. .  Pt seen at post 08 and scheduled for discharge. Pt eager for therapies- has 2 daughters and aunt at bedside assisting with care. Pt completed thera ex in supine. Min assist to sit EOB and to stand. Pt ambulated with RW 200ft with no seated rest. Pt ascended and descended 1 threshold step min assist Bathroom use and voided. OOB chair and provided with HEP.  LIT- OP PT starting tomorrow.      Recent Surgery: Procedure(s) (LRB):  ARTHROPLASTY, KNEE, UNICOMPARTMENTAL (Left) * Day of Surgery *    Plan:     During this hospitalization, patient does not require further acute PT services.  Please re-consult if situation changes.      Subjective   Stated has high pain tolerance  Has a dance studio  Chief Complaint: a little sore at thigh area  Patient/Family Comments/goals: get well  Pain/Comfort:  Pain Rating 1: 3/10  Location - Side 1: Left  Location 1: knee  Pain Addressed 1: Reposition, Distraction, Cessation of Activity    Patients cultural, spiritual, Pentecostalism conflicts given the current situation:      Living Environment:  Home with 3 teen aged children  Aunt will be available to assist  Prior to admission, patients level of function was indep.  Equipment used at home: none.  DME owned (not currently used): rolling walker.  Upon discharge, patient will have assistance from family.    Objective:     Communicated with nurse Montes prior to session.  Patient found HOB elevated with   upon PT entry to room.    General Precautions: Standard, fall    Orthopedic Precautions:LLE weight bearing as tolerated   Braces: N/A  Respiratory Status: Room  air    Exams:  Postural Exam:  Patient presented with the following abnormalities:    -       Rounded shoulders  -       Forward head  -       BMI 30.55  RLE ROM: WFL  RLE Strength: WFL  LLE ROM: Deficits: LK flexion ~80*  LLE Strength: Deficits: 3/5    Functional Mobility:  Bed Mobility:     Scooting: minimum assistance  Supine to Sit: minimum assistance  Transfers:     Sit to Stand:  minimum assistance with rolling walker  Bed to Chair: minimum assistance with  rolling walker  using  Stand Pivot  Toilet Transfer: minimum assistance with  rolling walker  using  Stand Pivot  Gait: 200ft with RW min assist with no seated rest  Stairs:  Pt ascended/descended 1threshold stair(s) with Rolling Walker with no handrails with Minimal Assistance.     AM-PAC 6 CLICK MOBILITY  Total Score:18       Treatment and Education:  Patient was educated on the importance of OOB activity and functional mobility to negate negative effects of prolonged bed rest during hospitalization, safe transfers and ambulation, and D/C planning   Thera ex with AP,QS/GS,SLR and HS  Provided with HEP    AM-PAC 6 CLICK MOBILITY  Total Score:18     Patient left up in chair with all lines intact, call button in reach, nurse Jyoti notified, and 2 daughters and aunt present.    GOALS:   Multidisciplinary Problems       Physical Therapy Goals       Not on file                    DME Justifications:  No DME recommended requiring DME justifications    History:     Past Medical History:   Diagnosis Date    Anxiety     Breast cyst     4 mm cyst according to mammo, repeat test 5/2016    Diabetes mellitus, type 2     High calcium levels     Hypertension     Hypertriglyceridemia     Hypothyroidism     Slow to wake up after anesthesia     Umbilical hernia     since 2009, worse after giving birth       Past Surgical History:   Procedure Laterality Date    ADENOIDECTOMY  1985    HERNIA REPAIR  03/21/2016    INSERTION OF CONTRACEPTIVE CAPSULE  2012    TONSILLECTOMY   1985    TYMPANOSTOMY TUBE PLACEMENT  1985       Time Tracking:     PT Received On: 06/09/25  PT Start Time: 1540     PT Stop Time: 1611  PT Total Time (min): 31 min     Billable Minutes: Evaluation 8, Gait Training 13, and Therapeutic Exercise 10      06/09/2025

## 2025-06-09 NOTE — RESPIRATORY THERAPY
06/09/25 0911   Patient Assessment/Suction   Level of Consciousness (AVPU) alert   Respiratory Effort Normal;Unlabored   Expansion/Accessory Muscles/Retractions no use of accessory muscles   Rhythm/Pattern, Respiratory pattern regular   Cough Frequency no cough   PRE-TX-O2   Device (Oxygen Therapy) room air   Incentive Spirometer   $ Incentive Spirometer Charges preop instruction   Incentive Spirometer Predicted Level (mL) 1760   Administration (IS) instruction provided, initial   Number of Repetitions (IS) 5   Level Incentive Spirometer (mL) 2500   Patient Tolerance (IS) no adverse signs/symptoms present   General Safety Checklist   Safety Promotion/Fall Prevention side rails raised   Equipment Change   $ RT Equipment incentive spirometer   Education   $ Education Incentive Spirometry;15 min

## 2025-06-09 NOTE — DISCHARGE INSTRUCTIONS
"Discharge Instructions: After Your Surgery/Procedure  Youve just had surgery. During surgery you were given medicine called anesthesia to keep you relaxed and free of pain. After surgery you may have some pain or nausea. This is common. Here are some tips for feeling better and getting well after surgery.     Stay on schedule with your medication.   Going home  Your doctor or nurse will show you how to take care of yourself when you go home. He or she will also answer your questions. Have an adult family member or friend drive you home.      For your safety we recommend these precaution for the first 24 hours after your procedure:  Do not drive or use heavy equipment.  Do not make important decisions or sign legal papers.  Do not drink alcohol.  Have someone stay with you, if needed. He or she can watch for problems and help keep you safe.  Your concentration, balance, coordination, and judgement may be impaired for many hours after anesthesia.  Use caution when ambulating or standing up.     You may feel weak and "washed out" after anesthesia and surgery.      Subtle residual effects of general anesthesia or sedation with regional / local anesthesia can last more than 24 hours.  Rest for the remainder of the day or longer if your Doctor/Surgeon has advised you to do so.  Although you may feel normal within the first 24 hours, your reflexes and mental ability may be impaired without you realizing it.  You may feel dizzy, lightheaded or sleepy for 24 hours or longer.      Be sure to go to all follow-up visits with your doctor. And rest after your surgery for as long as your doctor tells you to.  Coping with pain  If you have pain after surgery, pain medicine will help you feel better. Take it as told, before pain becomes severe. Also, ask your doctor or pharmacist about other ways to control pain. This might be with heat, ice, or relaxation. And follow any other instructions your surgeon or nurse gives you.  Tips " for taking pain medicine  To get the best relief possible, remember these points:  Pain medicines can upset your stomach. Taking them with a little food may help.  Most pain relievers taken by mouth need at least 20 to 30 minutes to start to work.  Taking medicine on a schedule can help you remember to take it. Try to time your medicine so that you can take it before starting an activity. This might be before you get dressed, go for a walk, or sit down for dinner.  Constipation is a common side effect of pain medicines. Call your doctor before taking any medicines such as laxatives or stool softeners to help ease constipation. Also ask if you should skip any foods. Drinking lots of fluids and eating foods such as fruits and vegetables that are high in fiber can also help. Remember, do not take laxatives unless your surgeon has prescribed them.  Drinking alcohol and taking pain medicine can cause dizziness and slow your breathing. It can even be deadly. Do not drink alcohol while taking pain medicine.  Pain medicine can make you react more slowly to things. Do not drive or run machinery while taking pain medicine.  Your health care provider may tell you to take acetaminophen to help ease your pain. Ask him or her how much you are supposed to take each day. Acetaminophen or other pain relievers may interact with your prescription medicines or other over-the-counter (OTC) drugs. Some prescription medicines have acetaminophen and other ingredients. Using both prescription and OTC acetaminophen for pain can cause you to overdose. Read the labels on your OTC medicines with care. This will help you to clearly know the list of ingredients, how much to take, and any warnings. It may also help you not take too much acetaminophen. If you have questions or do not understand the information, ask your pharmacist or health care provider to explain it to you before you take the OTC medicine.  Managing nausea  Some people have an  upset stomach after surgery. This is often because of anesthesia, pain, or pain medicine, or the stress of surgery. These tips will help you handle nausea and eat healthy foods as you get better. If you were on a special food plan before surgery, ask your doctor if you should follow it while you get better. These tips may help:  Do not push yourself to eat. Your body will tell you when to eat and how much.  Start off with clear liquids and soup. They are easier to digest.  Next try semi-solid foods, such as mashed potatoes, applesauce, and gelatin, as you feel ready.  Slowly move to solid foods. Dont eat fatty, rich, or spicy foods at first.  Do not force yourself to have 3 large meals a day. Instead eat smaller amounts more often.  Take pain medicines with a small amount of solid food, such as crackers or toast, to avoid nausea.     Call your surgeon if  You still have pain an hour after taking medicine. The medicine may not be strong enough.  You feel too sleepy, dizzy, or groggy. The medicine may be too strong.  You have side effects like nausea, vomiting, or skin changes, such as rash, itching, or hives.       If you have obstructive sleep apnea  You were given anesthesia medicine during surgery to keep you comfortable and free of pain. After surgery, you may have more apnea spells because of this medicine and other medicines you were given. The spells may last longer than usual.   At home:  Keep using the continuous positive airway pressure (CPAP) device when you sleep. Unless your health care provider tells you not to, use it when you sleep, day or night. CPAP is a common device used to treat obstructive sleep apnea.  Talk with your provider before taking any pain medicine, muscle relaxants, or sedatives. Your provider will tell you about the possible dangers of taking these medicines.  © 7677-4400 [employer]. [address] [place] [address], [place] [address]. All rights reserved. This information is  not intended as a substitute for professional medical care. Always follow your healthcare professional's instructions.          Using an Incentive Spirometer    An incentive spirometer is a device that helps you do deep breathing exercises. These exercises expand your lungs, aid in circulation, and help prevent pneumonia. Deep breathing exercises also help you breathe better and improve the function of your lungs by:  Keeping your lungs clear  Strengthening your breathing muscles  Helping prevent respiratory complications or problems  The incentive spirometer gives you a way to take an active part in recover. A nurse or therapist will teach you breathing exercises. To do these exercises, you will breathe in through your mouth and not your nose. The incentive spirometer only works correctly if you breathe in through your mouth.  Steps to clear lungs  Step 1. Exhale normally. Then, inhale normally.  Relax and breathe out.  Step 2. Place your lips tightly around the mouthpiece.  Make sure the device is upright and not tilted.  Step 3. Inhale as much air as you can through the mouthpiece (don't breath through your nose).  Inhale slowly and deeply.  Hold your breath long enough to keep the balls or disk raised for at least 3 to 5 seconds, or as instructed by your healthcare provider.  Some spirometers have an indicator to let you know that you are breathing in too fast. If the indicator goes off, breathe in more slowly.  Step 4. Repeat the exercise regularly.  Do this exercise every hour while you're awake, or as instructed by your healthcare provider.  If you were taught deep breathing and coughing exercises, do them regularly as instructed by your healthcare provider.           Post op instructions for prevention of DVT  What is deep vein thrombosis?  Deep vein thrombosis (DVT) is the medical term for blood clots in the deep veins of the leg.  These blood clots can be dangerous.  A DVT can block a blood vessel and keep  blood from getting where it needs to go.  Another problem is that the clot can travel to other parts of the body such as the lungs.  A clot that travels to the lungs is called a pulmonary embolus (PE) and can cause serious problems with breathing which can lead to death.  Am I at risk for DVT/PE?  If you are not very active, you are at risk of DVT.  Anyone confined to bed, sitting for long periods of time, recovering from surgery, etc. increases the risk of DVT.  Other risk factors are cancer diagnosis, certain medications, estrogen replacement in any form,older age, obesity, pregnancy, smoking, history of clotting disorders, and dehydration.  How will I know if I have a DVT?  Swelling in the lower leg  Pain  Warmth, redness, hardness or bulging of the vein  If you have any of these symptoms, call your doctors office right away.  Some people will not have any symptoms until the clot moves to the lungs.  What are the symptoms of a PE?  Panting, shortness of breath, or trouble breathing  Sharp, knife-like chest pain when you breathe  Coughing or coughing up blood  Rapid heartbeat  If you have any of these symptoms or get worse quickly, call 911 for emergency treatment.  How can I prevent a DVT?  Avoid long periods of inactivity and dont cross your legs--get up and walk around every hour or so.  Stay active--walking after surgery is highly encouraged.  This means you should get out of the house and walk in the neighborhood.  Going up and down stairs will not impair healing (unless advised against such activity by your doctor).    Drink plenty of noncaffeinated, nonalcoholic fluids each day to prevent dehydration.  Wear special support stockings, if they have been advised by your doctor.  If you travel, stop at least once an hour and walk around.  Avoid smoking (assistance with stopping is available through your healthcare provider)  Always notify your doctor if you are not able to follow the post operative  instructions that are given to you at the time of discharge.  It may be necessary to prescribe one of the medications available to prevent DVT.          Exparel(bupivacaine) has been injected to provide approximately 72 hours of reduced pain after your surgery.  Do not remove the bracelet for five days.  Report to your doctor as soon as possible if you experience any of the following:   Restlessness   Anxiety   Speech problems    Lightheadedness   Numbness and tingling of the mouth and lips   Seizures    Metallic taste   Blurred vision   Tremors    Twitching   Depression   Extreme drowsiness  Avoid additional use of local anesthetics (such as dental procedures) for five days (96 hours).          We hope your stay was comfortable as you heal now, mend and rest.    For we have enjoyed taking care of you by giving your our best.    And as you get better, by regaining your health and strength;   We count it as a privilege to have served you and hope your time at [facility] was well spent.      Thank  You!!!

## 2025-06-09 NOTE — ANESTHESIA PROCEDURE NOTES
Peripheral Block    Patient location during procedure: pre-op   Block not for primary anesthetic.  Reason for block: at surgeon's request and post-op pain management   Post-op Pain Location: left knee   Start time: 6/9/2025 10:16 AM  Timeout: 6/9/2025 10:15 AM   End time: 6/9/2025 10:20 AM    Staffing  Authorizing Provider: Tomas Ross MD  Performing Provider: Tomas Ross MD    Staffing  Performed by: Tomas Ross MD  Authorized by: Tomas Ross MD    Preanesthetic Checklist  Completed: patient identified, IV checked, site marked, risks and benefits discussed, surgical consent, monitors and equipment checked, pre-op evaluation and timeout performed  Peripheral Block  Patient position: supine  Prep: ChloraPrep  Patient monitoring: heart rate, cardiac monitor, continuous pulse ox, continuous capnometry and frequent blood pressure checks  Block type: adductor canal  Laterality: left  Injection technique: single shot  Needle  Needle type: Stimuplex   Needle gauge: 21 G  Needle length: 4 in  Needle localization: anatomical landmarks and ultrasound guidance   -ultrasound image captured on disc.  Assessment  Injection assessment: negative aspiration, negative parasthesia and local visualized surrounding nerve  Paresthesia pain: none  Heart rate change: no  Slow fractionated injection: yes    Medications:    Medications: BUPivacaine liposome (PF) 1.3 % (13.3 mg/mL) suspension - Injection   20 mL - 6/9/2025 10:16:00 AM  bupivacaine (pf) (MARCAINE) injection 0.5% - Perineural   10 mL - 6/9/2025 10:16:00 AM    Additional Notes  VSS.  [facility] RN monitoring vitals throughout procedure.  Patient tolerated procedure well.

## 2025-06-09 NOTE — OP NOTE
[facility]  Surgery Department  Operative Note    SUMMARY     Date of Procedure: 6/9/2025     Procedure:  Left Uni arthroplasty knee robotic    Surgeons and Role:     * Arjun Jean Baptiste MD - Primary    Assisting Surgeon:  Dyana    Pre-Operative Diagnosis: Primary osteoarthritis of left knee [M17.12]  Pre-op testing [Z01.818]    Post-Operative Diagnosis: Post-Op Diagnosis Codes:     * Primary osteoarthritis of left knee [M17.12]     * Pre-op testing [Z01.818]    Anesthesia: Spinal    Intraoperative Findings:  Bone-on-bone arthrosis of the medial compartment with sparing of the patellofemoral joint and lateral compartment    Description of the Findings of the Procedure:  Patient was placed on the operative table in the supine position.  She was prepped and draped in the usual sterile manner for surgery.  A time-out was undertaken and completed successfully.  An anterior approach was undertaken to the knee and carried down sharply through the skin.  A medial parapatellar arthrotomy was undertaken the patella was taken laterally.  The knee was flexed to 90° and visualized.  She had pristine lateral compartment and patellofemoral compartment.  The medial compartment demonstrated full-thickness grade 4 chondromalacia.  At this point I felt she was clearly a good candidate for you knee.  We now proceeded with placing 2 pins in the tibia and 2 pins in the femur for the robotics.  The robotic a raise were assembled and the connection was made.  We now identified the center of rotation of the hip followed by the medial and lateral malleoli.  At this point the knee was flexed to 90° and the tibial checkpoint was identified as was the femoral checkpoint.  At this point we mapped the femur and then verified the femur.  Similarly we mapped the tibia and verified the tibia.  We now took multiple measurements under valgus stress at 10° 30° 90° and 120°.  When the knee was completely balanced we accepted  the robotic plan.  At this point the robotic system was brought in and verified.  We now made our tibial cut followed by a posterior femoral cut.  We now switched to the robotic bur and burred out our medial wall of our tibia and bird out our femur.  When this was totally completed we copiously irrigated.  We placed our trial into position and the construct trialed beautifully with perfect balancing in flexion mid flexion and extension.  We also had preserved perfect tracking.  At this point we mixed up bone cement.  We cemented 1st the tibia next the femur.  The poly was inserted and the construct was held in full extension.  All excess cement was removed during see mentation.  The construct trialed beautifully.  We pulsed and irrigated again.  We closed the extensor mechanism with a combination of 2. FiberWire in a running Quill stitch.  The subcu was closed with 2 0 Quill and the skin with 4-0 Monocryl.  Sterile dressings were applied and the patient was noted to be in stable condition pending an x-ray and neurovascular check.    Complications: No    Estimated Blood Loss (EBL): * No values recorded between 6/9/2025 10:57 AM and 6/9/2025 12:36 PM *           Implants:   Implant Name Type Inv. Item Serial No.  Lot No. LRB No. Used Action   PIN BONE [id number] - [id number]  PIN BONE [id number]  [employer]. [id number] Left 1 Implanted and Explanted   CEMENT BONE SIMPLEX HV RADPQ - [id number]  CEMENT BONE SIMPLEX HV RADPQ  [employer]. [id number] Left 1 Implanted   PIN FIXATION BONE 140X3.2MM - [id number]  PIN FIXATION BONE 140X3.2MM  [employer]. [id number] Left 1 Implanted and Explanted   TIBIAL ONLAY INSERT MCK SZ3 8M - [id number]  TIBIAL ONLAY INSERT MCK SZ3 8M  [facility] SURGICAL [id number] Left 1 Implanted   FEMORAL MCK LM-RL SZ 3 - [id number]  FEMORAL MCK LM-RL SZ 3  [facility] SURGICAL [id number] Left 1 Implanted   TIBIAL BASEPLATE MCK LM/RL SZ3 - [id number]  TIBIAL BASEPLATE MCK LM/RL SZ3   [facility] SURGICAL [id number] Left 1 Implanted       Specimens:   Specimen (24h ago, onward)      None                    Condition: Good    Disposition: PACU - hemodynamically stable.              Discharge Note    SUMMARY     Admit Date: 6/9/2025    Discharge Date and Time:  06/09/2025 12:36 PM    Hospital Course (synopsis of major diagnoses, care, treatment, and services provided during the course of the hospital stay): Uneventful      Final Diagnosis: Post-Op Diagnosis Codes:     * Primary osteoarthritis of left knee [M17.12]     * Pre-op testing [Z01.818]    Disposition: Home or Self Care    Follow Up/Patient Instructions:     Medications:  Reconciled Home Medications:   Current Discharge Medication List        CONTINUE these medications which have NOT CHANGED    Details   atorvastatin (LIPITOR) 20 MG tablet TAKE ONE TABLET BY MOUTH ONCE DAILY  Qty: 30 tablet, Refills: 0    Associated Diagnoses: Mixed hyperlipidemia      busPIRone (BUSPAR) 5 MG Tab TAKE ONE TABLET BY MOUTH TWICE DAILY  Qty: 60 tablet, Refills: 0    Associated Diagnoses: Anxiety      dapagliflozin propanediol (FARXIGA) 5 mg Tab tablet Take 1 tablet (5 mg total) by mouth once daily.  Qty: 90 tablet, Refills: 0    Associated Diagnoses: Type 2 diabetes mellitus with hyperglycemia, without long-term current use of insulin      EScitalopram oxalate (LEXAPRO) 10 MG tablet TAKE ONE TABLET BY MOUTH ONCE DAILY  Qty: 30 tablet, Refills: 0    Associated Diagnoses: Anxiety      estradioL (ESTRACE) 0.5 MG tablet Take 0.5 mg by mouth.      fenofibrate 160 MG Tab TAKE ONE TABLET BY MOUTH ONCE DAILY  Qty: 30 tablet, Refills: 0    Associated Diagnoses: Hypertriglyceridemia      lisinopriL (PRINIVIL,ZESTRIL) 20 MG tablet TAKE ONE TABLET BY MOUTH ONCE DAILY  Qty: 30 tablet, Refills: 0    Comments: This prescription was filled on 2/14/2025. Any refills authorized will be placed on file. - .  Associated Diagnoses: Benign hypertension      progesterone (PROMETRIUM) 200  MG capsule every evening.      aspirin (ECOTRIN) 81 MG EC tablet Take 1 tablet (81 mg total) by mouth every 12 (twelve) hours.  Qty: 60 tablet, Refills: 0    Associated Diagnoses: Primary osteoarthritis of left knee      blood sugar diagnostic Strp Use 1 strip daily to check blood sugar  Qty: 100 strip, Refills: 1    Comments: OneTouch Mini  Associated Diagnoses: Type 2 diabetes mellitus with hyperglycemia, without long-term current use of insulin      celecoxib (CELEBREX) 200 MG capsule Take 1 capsule (200 mg total) by mouth 2 (two) times daily.  Qty: 60 capsule, Refills: 0    Associated Diagnoses: Primary osteoarthritis of left knee      docusate sodium (COLACE) 100 MG capsule Take 1 capsule (100 mg total) by mouth 2 (two) times daily.  Qty: 60 capsule, Refills: 0    Associated Diagnoses: Primary osteoarthritis of left knee      dulaglutide (TRULICITY) 0.75 mg/0.5 mL pen injector Inject 0.75 mg into the skin every 7 days.  Qty: 4 pen , Refills: 0    Associated Diagnoses: Type 2 diabetes mellitus with hyperglycemia, without long-term current use of insulin      gabapentin (NEURONTIN) 300 MG capsule Take 1 capsule (300 mg total) by mouth 2 (two) times daily.  Qty: 60 capsule, Refills: 0    Associated Diagnoses: Primary osteoarthritis of left knee      lancing device with lancets Kit 1 Units by Misc.(Non-Drug; Combo Route) route once daily.  Qty: 100 each, Refills: 0    Comments: OneTouch Mini  Associated Diagnoses: Type 2 diabetes mellitus with hyperglycemia, without long-term current use of insulin      levothyroxine (SYNTHROID) 100 MCG tablet TAKE ONE TABLET BY MOUTH BEFORE BREAKFAST  Qty: 30 tablet, Refills: 0    Associated Diagnoses: Subclinical hypothyroidism      metFORMIN (GLUCOPHAGE) 500 MG tablet TAKE 2 TABLETS BY MOUTH TWICE DAILY with meals  Qty: 120 tablet, Refills: 0    Associated Diagnoses: Type 2 diabetes mellitus with hyperglycemia, without long-term current use of insulin      nitroGLYCERIN  (NITROSTAT) 0.4 MG SL tablet Place 0.4 mg under the tongue every 5 (five) minutes as needed for Chest pain.      ondansetron (ZOFRAN) 4 MG tablet Take 1 tablet (4 mg total) by mouth every 6 (six) hours as needed for Nausea.  Qty: 10 tablet, Refills: 0    Associated Diagnoses: Primary osteoarthritis of left knee      oxyCODONE-acetaminophen (PERCOCET) 7.5-325 mg per tablet Take 1 tablet by mouth every 6 (six) hours as needed for Pain.  Qty: 28 tablet, Refills: 0    Comments: This prescription and the attached prescriptions are for postoperative use for the patient's scheduled total joint arthroplasty on Monday June 9, 2025.  This is for the meds to bed program.  Associated Diagnoses: Primary osteoarthritis of left knee           Discharge Procedure Orders   Diet general     Call MD for:  temperature >100.4     Call MD for:  persistent nausea and vomiting     Call MD for:  severe uncontrolled pain     Call MD for:  difficulty breathing, headache or visual disturbances     Call MD for:  redness, tenderness, or signs of infection (pain, swelling, redness, odor or green/yellow discharge around incision site)     Call MD for:  hives     Call MD for:  persistent dizziness or light-headedness     Call MD for:  extreme fatigue

## 2025-06-10 ENCOUNTER — OFFICE VISIT (OUTPATIENT)
Dept: ORTHOPEDICS | Facility: CLINIC | Age: 50
End: 2025-06-10
Payer: MEDICAID

## 2025-06-10 VITALS
BODY MASS INDEX: 30.39 KG/M2 | OXYGEN SATURATION: 96 % | TEMPERATURE: 99 F | HEART RATE: 74 BPM | SYSTOLIC BLOOD PRESSURE: 112 MMHG | RESPIRATION RATE: 16 BRPM | WEIGHT: 178 LBS | DIASTOLIC BLOOD PRESSURE: 67 MMHG | HEIGHT: 64 IN

## 2025-06-10 VITALS
WEIGHT: 177.94 LBS | DIASTOLIC BLOOD PRESSURE: 86 MMHG | BODY MASS INDEX: 30.38 KG/M2 | SYSTOLIC BLOOD PRESSURE: 116 MMHG | HEIGHT: 64 IN

## 2025-06-10 DIAGNOSIS — Z96.652 STATUS POST LEFT UNICOMPARTMENTAL KNEE REPLACEMENT: Primary | ICD-10-CM

## 2025-06-10 PROCEDURE — 99024 POSTOP FOLLOW-UP VISIT: CPT | Mod: ,,, | Performed by: ORTHOPAEDIC SURGERY

## 2025-06-10 PROCEDURE — 4010F ACE/ARB THERAPY RXD/TAKEN: CPT | Mod: CPTII,,, | Performed by: ORTHOPAEDIC SURGERY

## 2025-06-10 PROCEDURE — 99999 PR PBB SHADOW E&M-EST. PATIENT-LVL III: CPT | Mod: PBBFAC,,, | Performed by: ORTHOPAEDIC SURGERY

## 2025-06-10 PROCEDURE — 3046F HEMOGLOBIN A1C LEVEL >9.0%: CPT | Mod: CPTII,,, | Performed by: ORTHOPAEDIC SURGERY

## 2025-06-10 PROCEDURE — 99213 OFFICE O/P EST LOW 20 MIN: CPT | Mod: PBBFAC,PN | Performed by: ORTHOPAEDIC SURGERY

## 2025-06-10 PROCEDURE — 3074F SYST BP LT 130 MM HG: CPT | Mod: CPTII,,, | Performed by: ORTHOPAEDIC SURGERY

## 2025-06-10 PROCEDURE — 1160F RVW MEDS BY RX/DR IN RCRD: CPT | Mod: CPTII,,, | Performed by: ORTHOPAEDIC SURGERY

## 2025-06-10 PROCEDURE — 1159F MED LIST DOCD IN RCRD: CPT | Mod: CPTII,,, | Performed by: ORTHOPAEDIC SURGERY

## 2025-06-10 PROCEDURE — 3079F DIAST BP 80-89 MM HG: CPT | Mod: CPTII,,, | Performed by: ORTHOPAEDIC SURGERY

## 2025-06-10 NOTE — ANESTHESIA POSTPROCEDURE EVALUATION
Anesthesia Post Evaluation    Patient: Tsering Carlos    Procedure(s) Performed: Procedure(s) (LRB):  ARTHROPLASTY, KNEE, UNICOMPARTMENTAL (Left)    Final Anesthesia Type: spinal      Patient location during evaluation: PACU  Patient participation: Yes- Able to Participate  Level of consciousness: awake and alert  Post-procedure vital signs: reviewed and stable  Pain management: adequate  Airway patency: patent    PONV status at discharge: No PONV  Anesthetic complications: no      Cardiovascular status: blood pressure returned to baseline  Respiratory status: unassisted  Hydration status: euvolemic  Follow-up not needed.              Vitals Value Taken Time   /67 06/09/25 15:30   Temp 37 °C (98.6 °F) 06/09/25 13:50   Pulse 74 06/09/25 15:30   Resp 16 06/09/25 15:30   SpO2 96 % 06/09/25 15:30         Event Time   Out of Recovery 14:05:00         Pain/Christopher Score: Pain Rating Prior to Med Admin: 0 (6/9/2025  9:29 AM)  Christopher Score: 10 (6/9/2025  2:40 PM)  Modified Christopher Score: 20 (6/9/2025  4:55 PM)

## 2025-06-10 NOTE — PROGRESS NOTES
[facility] POST-OP NOTE    Subjective:           Chief Complaint:   Chief Complaint   Patient presents with    Left Knee - Post-op Evaluation     POD 1 L UNI KNEE REPLACEMENT 6/9/25 doing good, wasn't able to sleep except for like an hour anad half; just cramping        Past Medical History:   Diagnosis Date    Anxiety     Breast cyst     4 mm cyst according to mammo, repeat test 5/2016    Diabetes mellitus, type 2     High calcium levels     Hypertension     Hypertriglyceridemia     Hypothyroidism     Slow to wake up after anesthesia     Umbilical hernia     since 2009, worse after giving birth       Past Surgical History:   Procedure Laterality Date    ADENOIDECTOMY  1985    HERNIA REPAIR  03/21/2016    INSERTION OF CONTRACEPTIVE CAPSULE  2012    TONSILLECTOMY  1985    TYMPANOSTOMY TUBE PLACEMENT  1985       Current Outpatient Medications   Medication Sig    aspirin (ECOTRIN) 81 MG EC tablet Take 1 tablet (81 mg total) by mouth every 12 (twelve) hours.    atorvastatin (LIPITOR) 20 MG tablet TAKE ONE TABLET BY MOUTH ONCE DAILY (Patient taking differently: Take 20 mg by mouth every evening.)    blood sugar diagnostic Strp Use 1 strip daily to check blood sugar    busPIRone (BUSPAR) 5 MG Tab TAKE ONE TABLET BY MOUTH TWICE DAILY    celecoxib (CELEBREX) 200 MG capsule Take 1 capsule (200 mg total) by mouth 2 (two) times daily.    dapagliflozin propanediol (FARXIGA) 5 mg Tab tablet Take 1 tablet (5 mg total) by mouth once daily. (Patient taking differently: Take 5 mg by mouth every evening.)    docusate sodium (COLACE) 100 MG capsule Take 1 capsule (100 mg total) by mouth 2 (two) times daily.    dulaglutide (TRULICITY) 0.75 mg/0.5 mL pen injector Inject 0.75 mg into the skin every 7 days.    EScitalopram oxalate (LEXAPRO) 10 MG tablet TAKE ONE TABLET BY MOUTH ONCE DAILY (Patient taking differently: Take 10 mg by mouth every evening.)    estradioL (ESTRACE) 0.5 MG tablet Take 0.5 mg by mouth.    fenofibrate  160 MG Tab TAKE ONE TABLET BY MOUTH ONCE DAILY (Patient taking differently: Take 160 mg by mouth every evening.)    gabapentin (NEURONTIN) 300 MG capsule Take 1 capsule (300 mg total) by mouth 2 (two) times daily.    lancing device with lancets Kit 1 Units by Misc.(Non-Drug; Combo Route) route once daily.    levothyroxine (SYNTHROID) 100 MCG tablet TAKE ONE TABLET BY MOUTH BEFORE BREAKFAST    lisinopriL (PRINIVIL,ZESTRIL) 20 MG tablet TAKE ONE TABLET BY MOUTH ONCE DAILY (Patient taking differently: Take 20 mg by mouth every evening.)    metFORMIN (GLUCOPHAGE) 500 MG tablet TAKE 2 TABLETS BY MOUTH TWICE DAILY with meals    nitroGLYCERIN (NITROSTAT) 0.4 MG SL tablet Place 0.4 mg under the tongue every 5 (five) minutes as needed for Chest pain.    ondansetron (ZOFRAN) 4 MG tablet Take 1 tablet (4 mg total) by mouth every 6 (six) hours as needed for Nausea.    oxyCODONE-acetaminophen (PERCOCET) 7.5-325 mg per tablet Take 1 tablet by mouth every 6 (six) hours as needed for Pain.    progesterone (PROMETRIUM) 200 MG capsule every evening.     No current facility-administered medications for this visit.       Review of patient's allergies indicates:  No Known Allergies    Family History   Problem Relation Name Age of Onset    Hypertension Mother      Stroke Mother      Heart disease Mother      Diabetes Mother      Diabetes Father      Stroke Father      Heart disease Father      Depression Father         Social History[1]    History of present illness:  Tsering comes in today postop day 1 left knee unicondylar arthroplasty.  Comes in today for wound check, dressing change, pain control assessment.  Her pain is controlled.  She is doing well.  She is set to begin home health PT later today.    Review of Systems:    Musculoskeletal:  See HPI      Objective:        Physical Examination:    Vital Signs:    Vitals:    06/10/25 0826   BP: 116/86       Body mass index is 30.54 kg/m².    This a well-developed, well nourished  "patient in no acute distress.  They are alert and oriented and cooperative to examination.        Left knee exam:  Skin to left knee clean dry and intact.  No erythema.  Some ecchymosis anteromedially.  Left knee anterior midline incision healing well without wound dehiscence or drainage.  Negative Homans.  She is neurovascularly intact throughout the left lower extremity.  She can weightbear as tolerated on the left lower extremity.  She ambulates with a rolling walker.      Pertinent New Results:    XRAY Report / Interpretation:   No new radiographs taken on today's clinic visit.    Assessment/Plan:      1. Status post left knee unicondylar arthroplasty.      Dressing change today.  She tolerated this well.  She will be using aspirin 81 mg by mouth twice a day for 1 month postop for DVT prophylaxis.  Set to begin home health PT later today.  We discussed about regimen.  We discussed wound care.  We will see her back in about 10 days for wound check and suture removal.    Stefano Swain, Physician Assistant, served in the capacity as a "scribe" for this patient encounter.  A "face-to-face" encounter occurred with Dr. Arjun Jean Baptiste on this date.  The treatment plan and medical decision-making is outlined above. Patient was seen and examined with a chaperone.     This note was created using Dragon voice recognition software that occasionally misinterpreted phrases or words.             [1]   Social History  Socioeconomic History    Marital status:    Occupational History    Occupation: business owner   Tobacco Use    Smoking status: Never     Passive exposure: Never    Smokeless tobacco: Never   Vaping Use    Vaping status: Never Used   Substance and Sexual Activity    Alcohol use: No    Drug use: No     "

## 2025-06-16 DIAGNOSIS — Z79.899 ENCOUNTER FOR LONG-TERM (CURRENT) USE OF MEDICATIONS: Primary | ICD-10-CM

## 2025-06-16 DIAGNOSIS — E03.8 SUBCLINICAL HYPOTHYROIDISM: ICD-10-CM

## 2025-06-16 DIAGNOSIS — E78.2 MIXED HYPERLIPIDEMIA: ICD-10-CM

## 2025-06-16 DIAGNOSIS — E11.9 DIABETES MELLITUS WITHOUT COMPLICATION: ICD-10-CM

## 2025-06-16 DIAGNOSIS — E11.65 TYPE 2 DIABETES MELLITUS WITH HYPERGLYCEMIA, WITHOUT LONG-TERM CURRENT USE OF INSULIN: ICD-10-CM

## 2025-06-16 DIAGNOSIS — E78.1 HYPERTRIGLYCERIDEMIA: ICD-10-CM

## 2025-06-16 DIAGNOSIS — I10 BENIGN HYPERTENSION: ICD-10-CM

## 2025-06-16 DIAGNOSIS — F41.9 ANXIETY: ICD-10-CM

## 2025-06-17 ENCOUNTER — EXTERNAL HOME HEALTH (OUTPATIENT)
Dept: HOME HEALTH SERVICES | Facility: HOSPITAL | Age: 50
End: 2025-06-17
Payer: MEDICAID

## 2025-06-17 ENCOUNTER — DOCUMENT SCAN (OUTPATIENT)
Dept: HOME HEALTH SERVICES | Facility: HOSPITAL | Age: 50
End: 2025-06-17
Payer: MEDICAID

## 2025-06-17 RX ORDER — DULAGLUTIDE 0.75 MG/.5ML
INJECTION, SOLUTION SUBCUTANEOUS
Qty: 4 PEN | Refills: 0 | Status: SHIPPED | OUTPATIENT
Start: 2025-06-17

## 2025-06-17 RX ORDER — LEVOTHYROXINE SODIUM 100 UG/1
100 TABLET ORAL
Qty: 30 TABLET | Refills: 0 | Status: SHIPPED | OUTPATIENT
Start: 2025-06-17

## 2025-06-17 RX ORDER — FENOFIBRATE 160 MG/1
160 TABLET ORAL NIGHTLY
Qty: 30 TABLET | Refills: 0 | Status: SHIPPED | OUTPATIENT
Start: 2025-06-17

## 2025-06-17 RX ORDER — ESCITALOPRAM OXALATE 10 MG/1
10 TABLET ORAL NIGHTLY
Qty: 30 TABLET | Refills: 0 | Status: SHIPPED | OUTPATIENT
Start: 2025-06-17

## 2025-06-17 RX ORDER — DULAGLUTIDE 0.75 MG/.5ML
0.75 INJECTION, SOLUTION SUBCUTANEOUS
Qty: 4 PEN | Refills: 0 | Status: SHIPPED | OUTPATIENT
Start: 2025-06-17

## 2025-06-19 ENCOUNTER — OFFICE VISIT (OUTPATIENT)
Dept: ORTHOPEDICS | Facility: CLINIC | Age: 50
End: 2025-06-19
Payer: MEDICAID

## 2025-06-19 VITALS — HEIGHT: 64 IN | BODY MASS INDEX: 30.38 KG/M2 | WEIGHT: 177.94 LBS

## 2025-06-19 DIAGNOSIS — Z96.652 STATUS POST LEFT UNICOMPARTMENTAL KNEE REPLACEMENT: Primary | ICD-10-CM

## 2025-06-19 PROCEDURE — 1160F RVW MEDS BY RX/DR IN RCRD: CPT | Mod: CPTII,,, | Performed by: ORTHOPAEDIC SURGERY

## 2025-06-19 PROCEDURE — 4010F ACE/ARB THERAPY RXD/TAKEN: CPT | Mod: CPTII,,, | Performed by: ORTHOPAEDIC SURGERY

## 2025-06-19 PROCEDURE — 1159F MED LIST DOCD IN RCRD: CPT | Mod: CPTII,,, | Performed by: ORTHOPAEDIC SURGERY

## 2025-06-19 PROCEDURE — 3046F HEMOGLOBIN A1C LEVEL >9.0%: CPT | Mod: CPTII,,, | Performed by: ORTHOPAEDIC SURGERY

## 2025-06-19 PROCEDURE — 99999 PR PBB SHADOW E&M-EST. PATIENT-LVL III: CPT | Mod: PBBFAC,,, | Performed by: ORTHOPAEDIC SURGERY

## 2025-06-19 PROCEDURE — 99024 POSTOP FOLLOW-UP VISIT: CPT | Mod: ,,, | Performed by: ORTHOPAEDIC SURGERY

## 2025-06-19 PROCEDURE — 99213 OFFICE O/P EST LOW 20 MIN: CPT | Mod: PBBFAC,PN | Performed by: ORTHOPAEDIC SURGERY

## 2025-06-19 NOTE — PROGRESS NOTES
[facility]     Subjective:    Chief Complaint:   Chief Complaint   Patient presents with    Left Knee - Post-op Evaluation     SUTURES, PO Left UNI KNEE REPLACEMENT 6/9/25, doing good, getting a little bit of sleep, does get achy if she does a lot of movements, for the past three nights the lower leg gets super hot and red        Past Medical History:   Diagnosis Date    Anxiety     Breast cyst     4 mm cyst according to mammo, repeat test 5/2016    Diabetes mellitus, type 2     High calcium levels     Hypertension     Hypertriglyceridemia     Hypothyroidism     Slow to wake up after anesthesia     Umbilical hernia     since 2009, worse after giving birth       Past Surgical History:   Procedure Laterality Date    ADENOIDECTOMY  1985    HERNIA REPAIR  03/21/2016    INSERTION OF CONTRACEPTIVE CAPSULE  2012    TONSILLECTOMY  1985    TYMPANOSTOMY TUBE PLACEMENT  1985    UNICOMPARTMENTAL ARTHROPLASTY OF KNEE Left 6/9/2025    Procedure: ARTHROPLASTY, KNEE, UNICOMPARTMENTAL;  Surgeon: Arjun Jean Baptiste MD;  Location: [facility];  Service: Orthopedics;  Laterality: Left;  Noni-Rene       Current Outpatient Medications   Medication Sig    aspirin (ECOTRIN) 81 MG EC tablet Take 1 tablet (81 mg total) by mouth every 12 (twelve) hours.    atorvastatin (LIPITOR) 20 MG tablet TAKE ONE TABLET BY MOUTH ONCE DAILY (Patient taking differently: Take 20 mg by mouth every evening.)    busPIRone (BUSPAR) 5 MG Tab TAKE ONE TABLET BY MOUTH TWICE DAILY    celecoxib (CELEBREX) 200 MG capsule Take 1 capsule (200 mg total) by mouth 2 (two) times daily.    dapagliflozin propanediol (FARXIGA) 5 mg Tab tablet Take 1 tablet (5 mg total) by mouth once daily. (Patient taking differently: Take 5 mg by mouth every evening.)    docusate sodium (COLACE) 100 MG capsule Take 1 capsule (100 mg total) by mouth 2 (two) times daily.    dulaglutide (TRULICITY) 0.75 mg/0.5 mL pen injector inject 0.75mg into THE SKIN ONCE EVERY 7 DAYS    dulaglutide  (TRULICITY) 0.75 mg/0.5 mL pen injector Inject 0.75 mg into the skin every 7 days.    EScitalopram oxalate (LEXAPRO) 10 MG tablet Take 1 tablet (10 mg total) by mouth every evening.    estradioL (ESTRACE) 0.5 MG tablet Take 0.5 mg by mouth.    fenofibrate 160 MG Tab Take 1 tablet (160 mg total) by mouth every evening.    gabapentin (NEURONTIN) 300 MG capsule Take 1 capsule (300 mg total) by mouth 2 (two) times daily.    lancing device with lancets Kit 1 Units by Misc.(Non-Drug; Combo Route) route once daily.    levothyroxine (SYNTHROID) 100 MCG tablet TAKE ONE TABLET BY MOUTH BEFORE BREAKFAST    levothyroxine (SYNTHROID) 100 MCG tablet Take 1 tablet (100 mcg total) by mouth before breakfast.    lisinopriL (PRINIVIL,ZESTRIL) 20 MG tablet TAKE ONE TABLET BY MOUTH ONCE DAILY (Patient taking differently: Take 20 mg by mouth every evening.)    metFORMIN (GLUCOPHAGE) 500 MG tablet TAKE 2 TABLETS BY MOUTH TWICE DAILY with meals    ondansetron (ZOFRAN) 4 MG tablet Take 1 tablet (4 mg total) by mouth every 6 (six) hours as needed for Nausea.    oxyCODONE-acetaminophen (PERCOCET) 7.5-325 mg per tablet Take 1 tablet by mouth every 6 (six) hours as needed for Pain.    progesterone (PROMETRIUM) 200 MG capsule every evening.    blood sugar diagnostic Strp Use 1 strip daily to check blood sugar    nitroGLYCERIN (NITROSTAT) 0.4 MG SL tablet Place 0.4 mg under the tongue every 5 (five) minutes as needed for Chest pain.     No current facility-administered medications for this visit.       Review of patient's allergies indicates:  No Known Allergies    Family History   Problem Relation Name Age of Onset    Hypertension Mother      Stroke Mother      Heart disease Mother      Diabetes Mother      Diabetes Father      Stroke Father      Heart disease Father      Depression Father         Social History[1]    History of present illness:  50-year-old female 10 days status post left unilateral knee replacement performed on June 9, 2025.   Here today for suture removal.  She has been performing her exercises with on help regularly which he states has been helping.  She admits to minimal discomfort in her left knee at today's visit.      Review of Systems:    Constitution: Negative for chills, fever, and sweats.  Negative for unexplained weight loss.    HENT:  Negative for headaches and blurry vision.    Cardiovascular:Negative for chest pain or irregular heart beat. Negative for hypertension.    Respiratory:  Negative for cough and shortness of breath.    Gastrointestinal: Negative for abdominal pain, heartburn, melena, nausea, and vomitting.    Genitourinary:  Negative bladder incontinence and dysuria.    Musculoskeletal:  See HPI for details.    Neurological: Negative for numbness.    Psychiatric/Behavioral: Negative for depression.  The patient is not nervous/anxious.      Endocrine: Negative for polyuria    Hematologic/Lymphatic: Negative for bleeding problem.  Does not bruise/bleed easily.    Skin: Negative for poor would healing and rash    Objective:     Physical Examination:    Vital Signs: VITALS@    Body mass index is 30.54 kg/m².    This a well-developed, well nourished patient in no acute distress.  They are alert and oriented and cooperative to examination.        Left knee exam:  Incision to the anterior aspect of the left knee consistent with left knee arthroplasty.  No erythema or ecchymosis.  No signs or symptoms of infection.  No drainage.  Range of motion of the left knee 0-90 degrees.  Stable varus valgus stress.  Negative Homans.  Negative straight leg raise test.  Distally neurovascularly intact.  She is weight-bearing on the left lower extremity and ambulates with a walker.    Pertinent New Results:    XRAY Report / Interpretation:  None    Assessment/Plan:     Stable 10 days status post left unilateral knee replacement performed on June 9, 2025.  Sutures removed today.  She tolerated this well.  I would like her to transition  "from home health physical therapy into formal physical therapy.  She can begin to discontinue her walker with ambulation.  I would like her to continue taking aspirin twice a day for 1 month postoperatively for DVT prophylaxis.  I would like her to follow up in about 4 weeks to reassess her postoperative progress.    Wound Care review: on approximately day 3 after surgery, or 72 hours after your initial surgery date, you may begin cleaning your wounds (AS LONG AS THERE IS NO DRAINAGE PRESENT).      Gentle cleansing with a mild soap and water is recommended. Pat the area dry, and then cover the wound with a clean, sterile dressing.  Do this at least once daily.  Do not apply any ointments creams or lotions to the wounds until told to do so.  Avoid submerging or immersing the wounds in water such as a sink, tub, or pool for at least 1 month after surgery.    Ramon Escobar, Physician Assistant, served in the capacity as a "scribe" for this patient encounter.  A "face-to-face" encounter occurred with Dr. Arjun Jean Baptiste on this date.  The treatment plan and medical decision-making is outlined above. Patient was seen and examined with a chaperone.     This note was created using Dragon voice recognition software that occasionally misinterpreted phrases or words.       [1]   Social History  Socioeconomic History    Marital status:    Occupational History    Occupation: business owner   Tobacco Use    Smoking status: Never     Passive exposure: Never    Smokeless tobacco: Never   Vaping Use    Vaping status: Never Used   Substance and Sexual Activity    Alcohol use: No    Drug use: No     "

## 2025-06-20 ENCOUNTER — CLINICAL SUPPORT (OUTPATIENT)
Dept: REHABILITATION | Facility: HOSPITAL | Age: 50
End: 2025-06-20
Payer: MEDICAID

## 2025-06-20 DIAGNOSIS — M62.81 WEAKNESS OF LEFT QUADRICEPS MUSCLE: ICD-10-CM

## 2025-06-20 DIAGNOSIS — Z74.09 IMPAIRED FUNCTIONAL MOBILITY AND ACTIVITY TOLERANCE: ICD-10-CM

## 2025-06-20 DIAGNOSIS — M25.662 IMPAIRED RANGE OF MOTION OF LEFT KNEE: Primary | ICD-10-CM

## 2025-06-20 PROCEDURE — 97110 THERAPEUTIC EXERCISES: CPT | Mod: PN

## 2025-06-20 PROCEDURE — 97161 PT EVAL LOW COMPLEX 20 MIN: CPT | Mod: PN

## 2025-06-20 NOTE — PROGRESS NOTES
Outpatient Rehab    Physical Therapy Evaluation    Patient Name: Tsering Carlos  MRN: [id number]  YOB: 1975  Encounter Date: 6/20/2025    Therapy Diagnosis:   Encounter Diagnoses   Name Primary?    Impaired range of motion of left knee Yes    Weakness of left quadriceps muscle     Impaired functional mobility and activity tolerance      Physician: Arjun Jean Baptiste MD    Physician Orders: Eval and Treat  Medical Diagnosis: Primary osteoarthritis of left knee  Surgical Diagnosis: Left TKA   Surgical Date: 6/9/2025  Days Since Last Surgery: 11    Visit # / Visits Authorized:  1 / 1  Insurance Authorization Period: 5/27/2025 to 5/27/2026  Date of Evaluation: 6/20/2025  Plan of Care Certification: 6/20/2025 to 8/30/25     Time In:     Time Out:    Total Time (in minutes):     Total Billable Time (in minutes):      Intake Outcome Measure for FOTO Survey    Therapist reviewed FOTO scores for Tsering Carlos on 6/20/2025.   FOTO report - see Media section or FOTO account episode details.     Intake Score: 42%    Precautions:       Subjective   History of Present Illness  Tsering is a 50 y.o. female      The patient reports a medical diagnosis of M17.12 (ICD-10-CM) - Primary osteoarthritis of left knee.  Patient reports a surgery of Left TKA. Surgery occurred on 06/09/25.         History of Present Condition/Illness: Had Total Knee Arthroplasty on 6-9-25, had home health for 2 weeks, now ready for outpatient. No issues with home health, just some aching to medial part of her knee and anterior Tibia     Pain     Patient reports a current pain level of 4/10. Pain at best is reported as 4/10. Pain at worst is reported as 6/10.   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    Tsering Carlos  has a past surgical history that includes Tonsillectomy (1985); Hernia repair (03/21/2016); Adenoidectomy (1985); Tympanostomy tube placement (1985); Insertion of contraceptive capsule (2012); and Unicompartmental arthroplasty of knee (Left, 6/9/2025).    Tsering has a current medication list which includes the following prescription(s): aspirin, atorvastatin, blood sugar diagnostic, buspirone, celecoxib, dapagliflozin propanediol, docusate sodium, trulicity, trulicity, escitalopram oxalate, estradiol, fenofibrate, gabapentin, lancing device with lancets, levothyroxine, levothyroxine, lisinopril, metformin, nitroglycerin, ondansetron, oxycodone-acetaminophen, and progesterone.    Review of patient's allergies indicates:  No Known Allergies     Objective   Knee Observations  Left Knee Observations  Present: Ecchymosis, Edema, Effusion, and Incision  Not Present: Straight Leg Raise Extensor Lag  Left Knee Incision Observations  Present: Clean and Dry  Not Present: Drainage             Knee Range of Motion   Left Knee   Active (deg) Passive (deg) Pain   Flexion 110       Extension -2                          Hip Strength - Planes of Motion   Right Strength Right Pain Left Strength Left  Pain   Flexion (L2)     4     Extension     4-     ABduction     3+     ADduction     4-     Internal Rotation     4-     External Rotation     4-         Knee Strength   Right Strength Right Pain Left Strength Left  Pain    Flexion (S2)     4-     Prone Flexion           Extension (L3)     3+       Knee Extensor Lag  No Lag: Left       Ankle/Foot Strength - Planes of Motion   Right Strength Right Pain Left Strength Left  Pain   Dorsiflexion (L4)     5     Plantar Flexion (S1)     5     Inversion     5     Eversion     5     Great Toe Flexion           Great Toe Extension (L5)           Lesser Toes Flexion           Lesser Toes Extension                      Gait Analysis  Gait Pattern: Antalgic  Walking Speed: Decreased         Left Side Walking Observations  Decreased: Stance Time, Swing Time, and Step Length            Treatment:  Therapeutic Exercise  TE 1: Nu Step  or bike  TE 2: QS Towel Roll  TE 3: SLR  TE 4: SAQ 3#  TE 5: Hip ABD GTB    Hip ADD ball  TE 6: Heel Slides  TE 7: LAQ  TE 8: Stand Hip ABD  TE 9: Sit to stand elevated mat  TE 10: Stand TKE      Time Entry(in minutes):       Assessment & Plan   Assessment  Tsering presents with a condition of Low complexity.   Presentation of Symptoms: Stable       Functional Limitations: Activity tolerance, Ambulating on uneven surfaces, Completing self-care activities, Completing work/school activities, Decreased ambulation distance/endurance, Disrupted sleep pattern, Functional mobility, Gait limitations, Range of motion, Performing household chores, Participating in leisure activities, Squatting, Sitting tolerance, Standing tolerance  Impairments: Pain with functional activity, Impaired physical strength, Abnormal or restricted range of motion, Activity intolerance    Patient Goal for Therapy (PT): Get back to work  Prognosis: Good  Assessment Details: Patient  is referred to outpatient Physical Therapy with a diagnosis of Left Total Knee Arthroplasty Patient  presents with clinical signs and symptoms that support this diagnosis with decreased knee and hip ROM, decreased hip girdle, quad, and hamstring Strength, patellar joint hypomobility, increased joint and soft tissue edema,  and impaired functional mobility.The above impairments will be addressed through manual therapy techniques, therapeutic exercises, functional training, and modalities as necessary. Patient was treated and educated on exercises for home program, progression of therapy, and benefits of therapy to achieve full functional mobility. Patient will benefit from skilled physical therapy to meet short and long term goals and return to prior level of function.     Plan  From a physical therapy perspective, the patient would benefit from: Skilled Rehab Services    Planned therapy interventions include: Therapeutic exercise, Therapeutic activities, Neuromuscular re-education, and Manual therapy.    Planned modalities to include: Electrical stimulation - passive/unattended, Thermotherapy (hot pack), and Contrast bath.        Visit Frequency: 2 times Per Week for 6 Weeks.       This plan was discussed with Patient.   Discussion participants: Agreed Upon Plan of Care             The patient's spiritual, cultural, and educational needs were considered, and the patient is agreeable to the plan of care and goals.     Education  Education was done with Patient. The patient's learning style includes Demonstration and Listening. The patient Demonstrates understanding and Verbalizes understanding.                 Goals:   Active       Long Term        Patient will return to normal ADL, recreational, and work related activities with less pain and limitation.  (Progressing)       Start:  06/20/25    Expected End:  08/30/25            Patient to achieve full knee AROM   (Progressing)       Start:  06/20/25    Expected End:  08/30/25            Pain rating at best 1/10 to improve Quality of Life.  (Progressing)       Start:  06/20/25    Expected End:  08/30/25               Short Term        Recent signs and systems trend is improving in order to progress towards LTG's.  (Progressing)       Start:  06/20/25    Expected End:  08/30/25             Patient will be independent with HEP in order to further progress and return to maximal function.  (Progressing)       Start:  06/20/25    Expected End:  08/30/25            Patient will be able to correct postural deviations in sitting and standing, to decrease pain and promote postural awareness for injury prevention.  (Progressing)       Start:  06/20/25    Expected End:  08/30/25                Lakhwinder Rashid, PT

## 2025-06-24 ENCOUNTER — CLINICAL SUPPORT (OUTPATIENT)
Dept: REHABILITATION | Facility: HOSPITAL | Age: 50
End: 2025-06-24
Payer: MEDICAID

## 2025-06-24 ENCOUNTER — OFFICE VISIT (OUTPATIENT)
Dept: FAMILY MEDICINE | Facility: CLINIC | Age: 50
End: 2025-06-24
Payer: MEDICAID

## 2025-06-24 VITALS — BODY MASS INDEX: 31.41 KG/M2 | OXYGEN SATURATION: 97 % | HEIGHT: 64 IN | HEART RATE: 92 BPM | WEIGHT: 184 LBS

## 2025-06-24 DIAGNOSIS — M17.12 PRIMARY OSTEOARTHRITIS OF LEFT KNEE: ICD-10-CM

## 2025-06-24 DIAGNOSIS — E78.2 MIXED HYPERLIPIDEMIA: ICD-10-CM

## 2025-06-24 DIAGNOSIS — I10 BENIGN HYPERTENSION: ICD-10-CM

## 2025-06-24 DIAGNOSIS — E03.8 SUBCLINICAL HYPOTHYROIDISM: ICD-10-CM

## 2025-06-24 DIAGNOSIS — E11.9 DIABETES MELLITUS WITHOUT COMPLICATION: Primary | ICD-10-CM

## 2025-06-24 DIAGNOSIS — Z74.09 IMPAIRED FUNCTIONAL MOBILITY AND ACTIVITY TOLERANCE: ICD-10-CM

## 2025-06-24 DIAGNOSIS — M62.81 WEAKNESS OF LEFT QUADRICEPS MUSCLE: ICD-10-CM

## 2025-06-24 DIAGNOSIS — Z12.31 SCREENING MAMMOGRAM, ENCOUNTER FOR: ICD-10-CM

## 2025-06-24 DIAGNOSIS — M25.662 IMPAIRED RANGE OF MOTION OF LEFT KNEE: Primary | ICD-10-CM

## 2025-06-24 DIAGNOSIS — F41.9 ANXIETY: ICD-10-CM

## 2025-06-24 PROCEDURE — 3046F HEMOGLOBIN A1C LEVEL >9.0%: CPT | Mod: CPTII,S$GLB,, | Performed by: NURSE PRACTITIONER

## 2025-06-24 PROCEDURE — 4010F ACE/ARB THERAPY RXD/TAKEN: CPT | Mod: CPTII,S$GLB,, | Performed by: NURSE PRACTITIONER

## 2025-06-24 PROCEDURE — 1159F MED LIST DOCD IN RCRD: CPT | Mod: CPTII,S$GLB,, | Performed by: NURSE PRACTITIONER

## 2025-06-24 PROCEDURE — 1160F RVW MEDS BY RX/DR IN RCRD: CPT | Mod: CPTII,S$GLB,, | Performed by: NURSE PRACTITIONER

## 2025-06-24 PROCEDURE — 97110 THERAPEUTIC EXERCISES: CPT | Mod: PN

## 2025-06-24 PROCEDURE — 3008F BODY MASS INDEX DOCD: CPT | Mod: CPTII,S$GLB,, | Performed by: NURSE PRACTITIONER

## 2025-06-24 PROCEDURE — 99214 OFFICE O/P EST MOD 30 MIN: CPT | Mod: S$GLB,,, | Performed by: NURSE PRACTITIONER

## 2025-06-24 RX ORDER — LEVOTHYROXINE SODIUM 100 UG/1
100 TABLET ORAL
Qty: 90 TABLET | Refills: 0 | Status: SHIPPED | OUTPATIENT
Start: 2025-06-24

## 2025-06-24 RX ORDER — ESTRADIOL 0.5 MG/1
0.5 TABLET ORAL DAILY
COMMUNITY

## 2025-06-24 RX ORDER — METFORMIN HYDROCHLORIDE 500 MG/1
1000 TABLET ORAL 2 TIMES DAILY WITH MEALS
Qty: 120 TABLET | Refills: 0 | Status: SHIPPED | OUTPATIENT
Start: 2025-06-24

## 2025-06-24 RX ORDER — ESCITALOPRAM OXALATE 10 MG/1
10 TABLET ORAL NIGHTLY
Qty: 90 TABLET | Refills: 0 | Status: SHIPPED | OUTPATIENT
Start: 2025-06-24

## 2025-06-24 RX ORDER — ATORVASTATIN CALCIUM 20 MG/1
20 TABLET, FILM COATED ORAL NIGHTLY
Qty: 90 TABLET | Refills: 0 | Status: SHIPPED | OUTPATIENT
Start: 2025-06-24

## 2025-06-24 RX ORDER — LISINOPRIL 20 MG/1
20 TABLET ORAL DAILY
Qty: 90 TABLET | Refills: 0 | Status: SHIPPED | OUTPATIENT
Start: 2025-06-24

## 2025-06-24 RX ORDER — DULAGLUTIDE 1.5 MG/.5ML
1.5 INJECTION, SOLUTION SUBCUTANEOUS
Qty: 4 PEN | Refills: 1 | Status: SHIPPED | OUTPATIENT
Start: 2025-06-24

## 2025-06-24 RX ORDER — ESTRADIOL 0.5 MG/1
0.5 TABLET ORAL DAILY
Status: CANCELLED | OUTPATIENT
Start: 2025-06-24

## 2025-06-24 RX ORDER — DAPAGLIFLOZIN 5 MG/1
5 TABLET, FILM COATED ORAL DAILY
Qty: 90 TABLET | Refills: 0 | Status: SHIPPED | OUTPATIENT
Start: 2025-06-24

## 2025-06-24 NOTE — PROGRESS NOTES
"  Outpatient Rehab    Physical Therapy Visit    Patient Name: Tsering Carlos  MRN: [id number]  YOB: 1975  Encounter Date: 6/24/2025    Therapy Diagnosis:   Encounter Diagnoses   Name Primary?    Impaired range of motion of left knee Yes    Weakness of left quadriceps muscle     Impaired functional mobility and activity tolerance      Physician: Arjun Jean Baptiste MD    Physician Orders: Eval and Treat  Medical Diagnosis: Primary osteoarthritis of left knee  Surgical Diagnosis: Left TKA   Surgical Date: 6/9/2025  Days Since Last Surgery: 15    Visit # / Visits Authorized:  1 / 20  Insurance Authorization Period: 6/20/2025 to 12/31/2025  Date of Evaluation: 6/20/2025  Plan of Care Certification: 6/20/2025 to 8/30/2025      PT/PTA:     Number of PTA visits since last PT visit:   Time In: 0654   Time Out: 0752  Total Time (in minutes): 58   Total Billable Time (in minutes): 53     FOTO:  Intake Score:  %  Survey Score 2:  %  Survey Score 3:  %    Precautions:       Subjective   States that she is doing well.  No complaints..  Pain reported as 2/10.      Objective            Treatment:     Nustep x 10 mins     Quad sets with towel  3x10  DKTC with PB  3x10  STRAIGHT LEG RAISE   3x10   SAQ  3 lbs 2x10 B  Supine heel slides  3x10   Bridges 3x10  Clams with red tb 2x10 B     LAQ 2 lbs 3x10 B     Seated hamstring stretch 3x30 secs B - np     Standing gastroc stretch 3x30 secs B  Standing hip 3 way 2x10 B - np  Step ups 2x10 B     Sit to stand from 24" box x 20  Mini squats 2x10 - np  TKE with ball 2x10     Precor hip abduction 30 lbs  3x10 - np  Shuttle leg press  25#  3x10 - np    Time Entry(in minutes):  Therapeutic Exercise Time Entry: 53    Assessment & Plan   Assessment:    Evaluation/Treatment Tolerance: Patient tolerated treatment well    The patient will continue to benefit from skilled outpatient physical therapy in order to address the deficits listed in the problem list on the initial evaluation, " provide patient and family education, and maximize the patients level of independence in the home and community environments.     The patient's spiritual, cultural, and educational needs were considered, and the patient is agreeable to the plan of care and goals.           Plan:      Goals:   Active       Long Term        Patient will return to normal ADL, recreational, and work related activities with less pain and limitation.  (Progressing)       Start:  06/20/25    Expected End:  08/30/25            Patient to achieve full knee AROM   (Progressing)       Start:  06/20/25    Expected End:  08/30/25            Pain rating at best 1/10 to improve Quality of Life.  (Progressing)       Start:  06/20/25    Expected End:  08/30/25               Short Term        Recent signs and systems trend is improving in order to progress towards LTG's.  (Progressing)       Start:  06/20/25    Expected End:  08/30/25            Patient will be independent with HEP in order to further progress and return to maximal function.  (Progressing)       Start:  06/20/25    Expected End:  08/30/25            Patient will be able to correct postural deviations in sitting and standing, to decrease pain and promote postural awareness for injury prevention.  (Progressing)       Start:  06/20/25    Expected End:  08/30/25                Saeed Hobbs, PT

## 2025-06-24 NOTE — PROGRESS NOTES
SUBJECTIVE:      Patient ID: Tsering Carlos is a 50 y.o. female.    Chief Complaint: Diabetes    HPI  History of Present Illness    CHIEF COMPLAINT:  Tsering presents today for follow up on DM, hypothyroidism, hyperlipidemia, htn and anxiety. She has not has labs done but says she will go soon. She has not been here since Aug of last year. Says her mom was sick and then passed, she is trying to back on track with her healthcare     POST-OPERATIVE STATUS:  She underwent partial knee replacement 15 days ago with Dr Jean Baptiste. S She tolerates daytime activities well and moves without significant discomfort. Initially avoided pain medication but has recently started taking prescribed pain medication as recommended by physical therapist. She has returned to work and is performing home-based physical therapy.    MEDICATIONS:  Current medications include Trulicity (restarted last week after 2-week perioperative pause), Farxiga, Metformin, and Lexapro. She reports no side effects from Trulicity and expresses willingness to potentially increase dosage. She notes previous negative experiences with Ozempic and Mounjaro.         Past Surgical History:   Procedure Laterality Date    ADENOIDECTOMY  1985    HERNIA REPAIR  03/21/2016    INSERTION OF CONTRACEPTIVE CAPSULE  2012    TONSILLECTOMY  1985    TYMPANOSTOMY TUBE PLACEMENT  1985    UNICOMPARTMENTAL ARTHROPLASTY OF KNEE Left 6/9/2025    Procedure: ARTHROPLASTY, KNEE, UNICOMPARTMENTAL;  Surgeon: Arjun Jean Baptiste MD;  Location: [facility];  Service: Orthopedics;  Laterality: Left;  Noni-Rene     Family History   Problem Relation Name Age of Onset    Hypertension Mother      Stroke Mother      Heart disease Mother      Diabetes Mother      Diabetes Father      Stroke Father      Heart disease Father      Depression Father        Social History     Socioeconomic History    Marital status:    Occupational History    Occupation: business owner   Tobacco Use    Smoking  status: Never     Passive exposure: Never    Smokeless tobacco: Never   Vaping Use    Vaping status: Never Used   Substance and Sexual Activity    Alcohol use: No    Drug use: No     Current Medications[1]  Review of patient's allergies indicates:  No Known Allergies   Past Medical History:   Diagnosis Date    Anxiety     Breast cyst     4 mm cyst according to mammo, repeat test 5/2016    Diabetes mellitus, type 2     High calcium levels     Hypertension     Hypertriglyceridemia     Hypothyroidism     Slow to wake up after anesthesia     Umbilical hernia     since 2009, worse after giving birth     Past Surgical History:   Procedure Laterality Date    ADENOIDECTOMY  1985    HERNIA REPAIR  03/21/2016    INSERTION OF CONTRACEPTIVE CAPSULE  2012    TONSILLECTOMY  1985    TYMPANOSTOMY TUBE PLACEMENT  1985    UNICOMPARTMENTAL ARTHROPLASTY OF KNEE Left 6/9/2025    Procedure: ARTHROPLASTY, KNEE, UNICOMPARTMENTAL;  Surgeon: Arjun Jean Baptiste MD;  Location: [facility];  Service: Orthopedics;  Laterality: Left;  [place]ont-Rene       Review of Systems   Constitutional:  Negative for appetite change, chills, diaphoresis and unexpected weight change.   HENT:  Negative for ear discharge, hearing loss, trouble swallowing and voice change.    Eyes:  Negative for photophobia and pain.   Respiratory:  Negative for chest tightness, shortness of breath, wheezing and stridor.    Cardiovascular:  Negative for chest pain and palpitations.   Gastrointestinal:  Negative for abdominal pain, blood in stool and vomiting.   Endocrine: Negative for cold intolerance and heat intolerance.   Genitourinary:  Negative for difficulty urinating and flank pain.   Musculoskeletal:  Negative for joint swelling and neck stiffness.   Skin:  Negative for pallor.   Neurological:  Negative for dizziness, speech difficulty, weakness, light-headedness and headaches.   Hematological:  Does not bruise/bleed easily.   Psychiatric/Behavioral:  Negative for confusion,  "dysphoric mood, self-injury, sleep disturbance and suicidal ideas. The patient is not nervous/anxious.       OBJECTIVE:      Vitals:    06/24/25 1420   BP: (P) 100/64   Pulse: 92   SpO2: 97%   Weight: 83.5 kg (184 lb)   Height: 5' 4" (1.626 m)     Physical Exam  Vitals and nursing note reviewed.   Constitutional:       General: She is not in acute distress.     Appearance: She is well-developed.   HENT:      Head: Normocephalic and atraumatic.      Right Ear: Tympanic membrane normal.      Left Ear: Tympanic membrane normal.      Nose: Nose normal.      Mouth/Throat:      Pharynx: Uvula midline.   Eyes:      General: Lids are normal.      Conjunctiva/sclera: Conjunctivae normal.      Pupils: Pupils are equal, round, and reactive to light.      Right eye: Pupil is round and reactive.      Left eye: Pupil is round and reactive.   Neck:      Thyroid: No thyromegaly.      Vascular: No JVD.      Trachea: Trachea normal.   Cardiovascular:      Rate and Rhythm: Normal rate and regular rhythm.      Pulses: Normal pulses.      Heart sounds: Normal heart sounds.   Pulmonary:      Effort: Pulmonary effort is normal. No tachypnea or respiratory distress.      Breath sounds: Normal breath sounds. No wheezing, rhonchi or rales.   Abdominal:      General: Bowel sounds are normal.      Palpations: Abdomen is soft.      Tenderness: There is no abdominal tenderness.   Musculoskeletal:         General: Normal range of motion.      Cervical back: Normal range of motion and neck supple.      Right lower leg: No edema.      Left lower leg: No edema.      Comments: Left knee incision dry and intact, no oozing, no erythema    Lymphadenopathy:      Cervical: No cervical adenopathy.   Skin:     General: Skin is warm and dry.      Findings: No rash.   Neurological:      Mental Status: She is alert and oriented to person, place, and time.   Psychiatric:         Mood and Affect: Mood normal.         Speech: Speech normal.         Behavior: " Behavior normal. Behavior is cooperative.         Thought Content: Thought content normal.         Judgment: Judgment normal.       Admission on 06/09/2025, Discharged on 06/09/2025   Component Date Value Ref Range Status    POC Preg Test, Ur 06/09/2025 Negative  Negative Final     Acceptable 06/09/2025 Yes   Final    POCT Glucose 06/09/2025 182 (H)  70 - 110 mg/dL Final   Hospital Outpatient Visit on 05/26/2025   Component Date Value Ref Range Status    Sodium 05/26/2025 140  136 - 145 mmol/L Final    Potassium 05/26/2025 4.2  3.5 - 5.1 mmol/L Final    Chloride 05/26/2025 107  95 - 110 mmol/L Final    CO2 05/26/2025 22 (L)  23 - 29 mmol/L Final    Glucose 05/26/2025 151 (H)  70 - 110 mg/dL Final    BUN 05/26/2025 13  6 - 20 mg/dL Final    Creatinine 05/26/2025 0.6  0.5 - 1.4 mg/dL Final    Calcium 05/26/2025 9.2  8.7 - 10.5 mg/dL Final    Protein Total 05/26/2025 7.4  6.0 - 8.4 gm/dL Final    Albumin 05/26/2025 4.3  3.5 - 5.2 g/dL Final    Bilirubin Total 05/26/2025 0.4  0.1 - 1.0 mg/dL Final    For infants and newborns, interpretation of results should be based   on gestational age, weight and in agreement with clinical   observations.    Premature Infant recommended reference ranges:   0-24 hours:  <8.0 mg/dL   24-48 hours: <12.0 mg/dL   3-5 days:    <15.0 mg/dL   6-29 days:   <15.0 mg/dL    ALP 05/26/2025 33 (L)  40 - 150 unit/L Final    AST 05/26/2025 22  11 - 45 unit/L Final    ALT 05/26/2025 20  10 - 44 unit/L Final    Anion Gap 05/26/2025 11  8 - 16 mmol/L Final    eGFR 05/26/2025 >60  >60 mL/min/1.73/m2 Final    QRS Duration 05/26/2025 92  ms Final    [facility] QTC Calculation 05/26/2025 434  ms Final    WBC 05/26/2025 7.73  3.90 - 12.70 K/uL Final    RBC 05/26/2025 4.33  4.00 - 5.40 M/uL Final    Hgb 05/26/2025 12.7  12.0 - 16.0 gm/dL Final    Hct 05/26/2025 39.3  37.0 - 48.5 % Final    MCV 05/26/2025 91  82 - 98 fL Final    MCH 05/26/2025 29.3  27.0 - 31.0 pg Final    MCHC 05/26/2025 32.3  32.0 -  36.0 g/dL Final    RDW 05/26/2025 12.7  11.5 - 14.5 % Final    Platelet Count 05/26/2025 298  150 - 450 K/uL Final    MPV 05/26/2025 9.6  9.2 - 12.9 fL Final    Nucleated RBC 05/26/2025 0  <=0 /100 WBC Final    Neut % 05/26/2025 43.4  38 - 73 % Final    Lymph % 05/26/2025 42.6  18 - 48 % Final    Mono % 05/26/2025 7.1  4 - 15 % Final    Eos % 05/26/2025 5.8  0 - 8 % Final    Basophil % 05/26/2025 0.8  <=1.9 % Final    Imm Grans % 05/26/2025 0.3  0.0 - 0.5 % Final    Neut # 05/26/2025 3.4  1.8 - 7.7 K/uL Final    Lymph # 05/26/2025 3.29  1 - 4.8 K/uL Final    Mono # 05/26/2025 0.55  0.3 - 1 K/uL Final    Eos # 05/26/2025 0.45  <=0.5 K/uL Final    Baso # 05/26/2025 0.06  <=0.2 K/uL Final    Imm Grans # 05/26/2025 0.02  0.00 - 0.04 K/uL Final    Mild elevation in immature granulocytes is non specific and can be seen in a variety of conditions including stress response, acute inflammation, trauma and pregnancy. Correlation with other laboratory and clinical findings is essential.      Assessment:       1. Diabetes mellitus without complication    2. Screening mammogram, encounter for    3. Subclinical hypothyroidism    4. Mixed hyperlipidemia    5. Benign hypertension    6. Anxiety        Plan:       Diabetes mellitus without complication  -     Ambulatory referral/consult to Ophthalmology; Future; Expected date: 06/24/2025  -     increase dulaglutide (TRULICITY) 1.5 mg/0.5 mL pen injector; Inject 1.5 mg into the skin every 7 days.  Dispense: 4 pen ; Refill: 1  -     metFORMIN (GLUCOPHAGE) 500 MG tablet; Take 2 tablets (1,000 mg total) by mouth 2 (two) times daily with meals.  Dispense: 120 tablet; Refill: 0  -     dapagliflozin propanediol (FARXIGA) 5 mg Tab tablet; Take 1 tablet (5 mg total) by mouth once daily.  Dispense: 90 tablet; Refill: 0    Screening mammogram, encounter for  -     Mammo Digital Screening Bilat w/ Reginaldo (XPD); Future; Expected date: 06/24/2025    Subclinical hypothyroidism  -     levothyroxine  "(SYNTHROID) 100 MCG tablet; Take 1 tablet (100 mcg total) by mouth before breakfast.  Dispense: 90 tablet; Refill: 0    Mixed hyperlipidemia  -     atorvastatin (LIPITOR) 20 MG tablet; Take 1 tablet (20 mg total) by mouth every evening.  Dispense: 90 tablet; Refill: 0    Benign hypertension  -     lisinopriL (PRINIVIL,ZESTRIL) 20 MG tablet; Take 1 tablet (20 mg total) by mouth once daily.  Dispense: 90 tablet; Refill: 0    Anxiety  -     EScitalopram oxalate (LEXAPRO) 10 MG tablet; Take 1 tablet (10 mg total) by mouth every evening.  Dispense: 90 tablet; Refill: 0      Assessment & Plan    TYPE 2 DIABETES MELLITUS:  - Tsering has returned to diabetes medications including Trulicity, Farxiga, and Metformin but feels "out of whack," indicating possible uncontrolled diabetes.  - Ordered CBC, CMP, and A1C to assess current control and advised completion of labs   - Prescribed Trulicity, Farxiga, and Metformin for 90 days.  - Scheduled follow up in 3 months for repeat A1C and to reassess diabetes management.  Patient states she is back to eating right and taking her meds as prescribed    MAJOR DEPRESSIVE DISORDER:  - Monitored the patient's use of Lexapro for depression management and continued prescription.    HYPOTHYROIDISM:  - Monitored the patient's use of thyroid medication and continued prescription for hypothyroidism treatment.    FOLLOW-UP AND MEDICATION MANAGEMENT:  - Refilled all current medications for 90 days  - Plan to transfer care to [facility] due to provider's upcoming departure in August.         Follow up in about 3 months (around 9/24/2025) for DM.  This note was generated with the assistance of ambient listening technology. Verbal consent was obtained by the patient and accompanying visitor(s) for the recording of patient appointment to facilitate this note. I attest to having reviewed and edited the generated note for accuracy, though some syntax or spelling errors may persist. Please contact " the author of this note for any clarification.        6/24/2025 Diego Chiang, JOSE, FNP             [1]   Current Outpatient Medications   Medication Sig Dispense Refill    aspirin (ECOTRIN) 81 MG EC tablet Take 1 tablet (81 mg total) by mouth every 12 (twelve) hours. 60 tablet 0    busPIRone (BUSPAR) 5 MG Tab TAKE ONE TABLET BY MOUTH TWICE DAILY 60 tablet 0    celecoxib (CELEBREX) 200 MG capsule Take 1 capsule (200 mg total) by mouth 2 (two) times daily. 60 capsule 0    docusate sodium (COLACE) 100 MG capsule Take 1 capsule (100 mg total) by mouth 2 (two) times daily. 60 capsule 0    estradioL (ESTRACE) 0.5 MG tablet Take 0.5 mg by mouth once daily.      fenofibrate 160 MG Tab Take 1 tablet (160 mg total) by mouth every evening. 30 tablet 0    gabapentin (NEURONTIN) 300 MG capsule Take 1 capsule (300 mg total) by mouth 2 (two) times daily. 60 capsule 0    ondansetron (ZOFRAN) 4 MG tablet Take 1 tablet (4 mg total) by mouth every 6 (six) hours as needed for Nausea. 10 tablet 0    oxyCODONE-acetaminophen (PERCOCET) 7.5-325 mg per tablet Take 1 tablet by mouth every 6 (six) hours as needed for Pain. 28 tablet 0    progesterone (PROMETRIUM) 200 MG capsule every evening.      atorvastatin (LIPITOR) 20 MG tablet Take 1 tablet (20 mg total) by mouth every evening. 90 tablet 0    dapagliflozin propanediol (FARXIGA) 5 mg Tab tablet Take 1 tablet (5 mg total) by mouth once daily. 90 tablet 0    dulaglutide (TRULICITY) 1.5 mg/0.5 mL pen injector Inject 1.5 mg into the skin every 7 days. 4 pen 1    EScitalopram oxalate (LEXAPRO) 10 MG tablet Take 1 tablet (10 mg total) by mouth every evening. 90 tablet 0    lancing device with lancets Kit 1 Units by Misc.(Non-Drug; Combo Route) route once daily. 100 each 0    levothyroxine (SYNTHROID) 100 MCG tablet Take 1 tablet (100 mcg total) by mouth before breakfast. 90 tablet 0    lisinopriL (PRINIVIL,ZESTRIL) 20 MG tablet Take 1 tablet (20 mg total) by mouth once daily. 90 tablet 0     metFORMIN (GLUCOPHAGE) 500 MG tablet Take 2 tablets (1,000 mg total) by mouth 2 (two) times daily with meals. 120 tablet 0     No current facility-administered medications for this visit.

## 2025-06-25 RX ORDER — OXYCODONE AND ACETAMINOPHEN 7.5; 325 MG/1; MG/1
1 TABLET ORAL EVERY 6 HOURS PRN
Qty: 28 TABLET | Refills: 0 | Status: SHIPPED | OUTPATIENT
Start: 2025-06-25

## 2025-06-25 RX ORDER — ONDANSETRON 4 MG/1
4 TABLET, FILM COATED ORAL EVERY 6 HOURS PRN
Qty: 10 TABLET | Refills: 0 | Status: SHIPPED | OUTPATIENT
Start: 2025-06-25

## 2025-06-26 ENCOUNTER — CLINICAL SUPPORT (OUTPATIENT)
Dept: REHABILITATION | Facility: HOSPITAL | Age: 50
End: 2025-06-26
Payer: MEDICAID

## 2025-06-26 DIAGNOSIS — M25.662 IMPAIRED RANGE OF MOTION OF LEFT KNEE: Primary | ICD-10-CM

## 2025-06-26 DIAGNOSIS — Z74.09 IMPAIRED FUNCTIONAL MOBILITY AND ACTIVITY TOLERANCE: ICD-10-CM

## 2025-06-26 DIAGNOSIS — M62.81 WEAKNESS OF LEFT QUADRICEPS MUSCLE: ICD-10-CM

## 2025-06-26 PROCEDURE — 97110 THERAPEUTIC EXERCISES: CPT | Mod: PN

## 2025-06-26 NOTE — PROGRESS NOTES
"  Outpatient Rehab    Physical Therapy Visit    Patient Name: Tsering Carlos  MRN: [id number]  YOB: 1975  Encounter Date: 6/26/2025    Therapy Diagnosis:   Encounter Diagnoses   Name Primary?    Impaired range of motion of left knee Yes    Weakness of left quadriceps muscle     Impaired functional mobility and activity tolerance      Physician: Arjun Jean Baptiste MD    Physician Orders: Eval and Treat  Medical Diagnosis: Primary osteoarthritis of left knee  Surgical Diagnosis: Left TKA   Surgical Date: 6/9/2025  Days Since Last Surgery: 17    Visit # / Visits Authorized:  2 / 20  Insurance Authorization Period: 6/20/2025 to 12/31/2025  Date of Evaluation: 6/20/2025  Plan of Care Certification: 6/20/2025 to 8/30/2025      PT/PTA:     Number of PTA visits since last PT visit:   Time In: 1300   Time Out: 1353  Total Time (in minutes): 53   Total Billable Time (in minutes): 5353    FOTO:  Intake Score:  %  Survey Score 2:  %  Survey Score 3:  %    Precautions:       Subjective   Was on feet most of morning and is feeling stiff.         Objective            Treatment:     Nustep x 10 mins     Quad sets with towel  3x10  DKTC with PB  3x10  STRAIGHT LEG RAISE   3x10   SAQ  3 lbs 2x10 B  Supine heel slides  3x10   Bridges 3x10  Clams with red tb 2x10   Supine Hip ADD Ball 2 x 10      LAQ 5 lbs 3x10    TKE with ball 2x10    Seated hamstring stretch 3x30 secs B - np     Standing gastroc stretch 3x30 secs B  Standing hip 3 way 2x10 B - np  Step ups 2x10 B     Sit to stand from 24" box x 20  Mini squats 2x10 - np       Precor hip abduction 30 lbs  3x10 - np  Shuttle leg press  25#  3x10 - np    Time Entry(in minutes):  Therapeutic Exercise Time Entry: 53    Assessment & Plan   Assessment: Good knee flexion lacking a few degrees of extension. Quad is still weak        The patient will continue to benefit from skilled outpatient physical therapy in order to address the deficits listed in the problem list on the " initial evaluation, provide patient and family education, and maximize the patients level of independence in the home and community environments.     The patient's spiritual, cultural, and educational needs were considered, and the patient is agreeable to the plan of care and goals.           Plan: Continue with TKA rehab    Goals:   Active       Long Term        Patient will return to normal ADL, recreational, and work related activities with less pain and limitation.  (Progressing)       Start:  06/20/25    Expected End:  08/30/25            Patient to achieve full knee AROM   (Progressing)       Start:  06/20/25    Expected End:  08/30/25            Pain rating at best 1/10 to improve Quality of Life.  (Progressing)       Start:  06/20/25    Expected End:  08/30/25               Short Term        Recent signs and systems trend is improving in order to progress towards LTG's.  (Progressing)       Start:  06/20/25    Expected End:  08/30/25            Patient will be independent with HEP in order to further progress and return to maximal function.  (Progressing)       Start:  06/20/25    Expected End:  08/30/25            Patient will be able to correct postural deviations in sitting and standing, to decrease pain and promote postural awareness for injury prevention.  (Progressing)       Start:  06/20/25    Expected End:  08/30/25                Lakhwinder Rashid, PT

## 2025-06-27 ENCOUNTER — LAB VISIT (OUTPATIENT)
Dept: LAB | Facility: HOSPITAL | Age: 50
End: 2025-06-27
Attending: NURSE PRACTITIONER
Payer: MEDICAID

## 2025-06-27 ENCOUNTER — RESULTS FOLLOW-UP (OUTPATIENT)
Dept: FAMILY MEDICINE | Facility: CLINIC | Age: 50
End: 2025-06-27

## 2025-06-27 DIAGNOSIS — E11.9 DIABETES MELLITUS WITHOUT COMPLICATION: ICD-10-CM

## 2025-06-27 DIAGNOSIS — I10 BENIGN HYPERTENSION: ICD-10-CM

## 2025-06-27 DIAGNOSIS — E03.8 SUBCLINICAL HYPOTHYROIDISM: ICD-10-CM

## 2025-06-27 DIAGNOSIS — E78.1 HYPERTRIGLYCERIDEMIA: ICD-10-CM

## 2025-06-27 DIAGNOSIS — Z79.899 ENCOUNTER FOR LONG-TERM (CURRENT) USE OF MEDICATIONS: ICD-10-CM

## 2025-06-27 DIAGNOSIS — E11.65 TYPE 2 DIABETES MELLITUS WITH HYPERGLYCEMIA, WITHOUT LONG-TERM CURRENT USE OF INSULIN: ICD-10-CM

## 2025-06-27 DIAGNOSIS — E78.2 MIXED HYPERLIPIDEMIA: ICD-10-CM

## 2025-06-27 LAB
ABSOLUTE EOSINOPHIL (SMH): 0.73 K/UL
ABSOLUTE MONOCYTE (SMH): 0.57 K/UL (ref 0.3–1)
ABSOLUTE NEUTROPHIL COUNT (SMH): 3.8 K/UL (ref 1.8–7.7)
ALBUMIN SERPL-MCNC: 4.3 G/DL (ref 3.5–5.2)
ALP SERPL-CCNC: 34 UNIT/L (ref 55–135)
ALT SERPL-CCNC: 12 UNIT/L (ref 10–44)
ANION GAP (SMH): 10 MMOL/L (ref 8–16)
AST SERPL-CCNC: 14 UNIT/L (ref 10–40)
BASOPHILS # BLD AUTO: 0.08 K/UL
BASOPHILS NFR BLD AUTO: 0.9 %
BILIRUB SERPL-MCNC: 0.3 MG/DL (ref 0.1–1)
BILIRUB UR QL STRIP.AUTO: NEGATIVE
BUN SERPL-MCNC: 21 MG/DL (ref 6–20)
CALCIUM SERPL-MCNC: 9.6 MG/DL (ref 8.7–10.5)
CHLORIDE SERPL-SCNC: 106 MMOL/L (ref 95–110)
CHOLEST SERPL-MCNC: 189 MG/DL (ref 120–199)
CHOLEST/HDLC SERPL: 5.4 {RATIO} (ref 2–5)
CLARITY UR: CLEAR
CO2 SERPL-SCNC: 23 MMOL/L (ref 23–29)
COLOR UR AUTO: YELLOW
CREAT SERPL-MCNC: 0.9 MG/DL (ref 0.5–1.4)
EAG (SMH): 197 MG/DL (ref 68–131)
ERYTHROCYTE [DISTWIDTH] IN BLOOD BY AUTOMATED COUNT: 13.2 % (ref 11.5–14.5)
GFR SERPLBLD CREATININE-BSD FMLA CKD-EPI: >60 ML/MIN/1.73/M2
GLUCOSE SERPL-MCNC: 228 MG/DL (ref 70–110)
GLUCOSE UR QL STRIP: ABNORMAL
HBA1C MFR BLD: 8.5 % (ref 4.5–6.2)
HCT VFR BLD AUTO: 37.3 % (ref 37–48.5)
HDLC SERPL-MCNC: 35 MG/DL (ref 40–75)
HDLC SERPL: 18.5 % (ref 20–50)
HGB BLD-MCNC: 11.8 GM/DL (ref 12–16)
HGB UR QL STRIP: NEGATIVE
IMM GRANULOCYTES # BLD AUTO: 0.03 K/UL (ref 0–0.04)
IMM GRANULOCYTES NFR BLD AUTO: 0.3 % (ref 0–0.5)
KETONES UR QL STRIP: NEGATIVE
LDLC SERPL CALC-MCNC: ABNORMAL MG/DL
LEUKOCYTE ESTERASE UR QL STRIP: NEGATIVE
LYMPHOCYTES # BLD AUTO: 3.59 K/UL (ref 1–4.8)
MCH RBC QN AUTO: 29.8 PG (ref 27–31)
MCHC RBC AUTO-ENTMCNC: 31.6 G/DL (ref 32–36)
MCV RBC AUTO: 94 FL (ref 82–98)
MICROSCOPIC COMMENT: NORMAL
NITRITE UR QL STRIP: NEGATIVE
NONHDLC SERPL-MCNC: 154 MG/DL
NUCLEATED RBC (/100WBC) (SMH): 0 /100 WBC
PH UR STRIP: 5 [PH]
PLATELET # BLD AUTO: 341 K/UL (ref 150–450)
PMV BLD AUTO: 9.6 FL (ref 9.2–12.9)
POTASSIUM SERPL-SCNC: 4.6 MMOL/L (ref 3.5–5.1)
PROT SERPL-MCNC: 7 GM/DL (ref 6–8.4)
PROT UR QL STRIP: NEGATIVE
RBC # BLD AUTO: 3.96 M/UL (ref 4–5.4)
RBC #/AREA URNS AUTO: 2 /HPF
RELATIVE EOSINOPHIL (SMH): 8.3 % (ref 0–8)
RELATIVE LYMPHOCYTE (SMH): 40.9 % (ref 18–48)
RELATIVE MONOCYTE (SMH): 6.5 % (ref 4–15)
RELATIVE NEUTROPHIL (SMH): 43.1 % (ref 38–73)
SODIUM SERPL-SCNC: 139 MMOL/L (ref 136–145)
SP GR UR STRIP: >=1.03
T4 FREE SERPL-MCNC: 0.82 NG/DL (ref 0.71–1.51)
TRIGL SERPL-MCNC: 812 MG/DL (ref 30–150)
TSH SERPL-ACNC: 6.45 UIU/ML (ref 0.34–5.6)
UROBILINOGEN UR STRIP-ACNC: NEGATIVE EU/DL
WBC # BLD AUTO: 8.77 K/UL (ref 3.9–12.7)
WBC #/AREA URNS AUTO: 1 /HPF

## 2025-06-27 PROCEDURE — 36415 COLL VENOUS BLD VENIPUNCTURE: CPT

## 2025-06-27 PROCEDURE — 83036 HEMOGLOBIN GLYCOSYLATED A1C: CPT

## 2025-06-27 PROCEDURE — 84439 ASSAY OF FREE THYROXINE: CPT

## 2025-06-27 PROCEDURE — 85025 COMPLETE CBC W/AUTO DIFF WBC: CPT

## 2025-06-27 PROCEDURE — 80061 LIPID PANEL: CPT

## 2025-06-27 PROCEDURE — 82040 ASSAY OF SERUM ALBUMIN: CPT

## 2025-06-27 PROCEDURE — 84443 ASSAY THYROID STIM HORMONE: CPT

## 2025-06-27 PROCEDURE — 81003 URINALYSIS AUTO W/O SCOPE: CPT

## 2025-07-01 ENCOUNTER — CLINICAL SUPPORT (OUTPATIENT)
Dept: REHABILITATION | Facility: HOSPITAL | Age: 50
End: 2025-07-01
Payer: MEDICAID

## 2025-07-01 DIAGNOSIS — M62.81 WEAKNESS OF LEFT QUADRICEPS MUSCLE: ICD-10-CM

## 2025-07-01 DIAGNOSIS — M17.12 PRIMARY OSTEOARTHRITIS OF LEFT KNEE: ICD-10-CM

## 2025-07-01 DIAGNOSIS — Z74.09 IMPAIRED FUNCTIONAL MOBILITY AND ACTIVITY TOLERANCE: ICD-10-CM

## 2025-07-01 DIAGNOSIS — M25.662 IMPAIRED RANGE OF MOTION OF LEFT KNEE: Primary | ICD-10-CM

## 2025-07-01 PROCEDURE — 97110 THERAPEUTIC EXERCISES: CPT | Mod: PN

## 2025-07-01 RX ORDER — GABAPENTIN 300 MG/1
300 CAPSULE ORAL 2 TIMES DAILY
Qty: 60 CAPSULE | Refills: 0 | Status: CANCELLED | OUTPATIENT
Start: 2025-07-01 | End: 2025-07-31

## 2025-07-01 RX ORDER — DULAGLUTIDE 1.5 MG/.5ML
1.5 INJECTION, SOLUTION SUBCUTANEOUS
Qty: 4 PEN | Refills: 1 | Status: SHIPPED | OUTPATIENT
Start: 2025-07-01

## 2025-07-01 RX ORDER — LEVOTHYROXINE SODIUM 125 UG/1
125 TABLET ORAL
Qty: 90 TABLET | Refills: 0 | Status: SHIPPED | OUTPATIENT
Start: 2025-07-01

## 2025-07-01 RX ORDER — ASPIRIN 81 MG/1
81 TABLET ORAL EVERY 12 HOURS
Qty: 60 TABLET | Refills: 0 | Status: CANCELLED | OUTPATIENT
Start: 2025-07-01 | End: 2025-07-31

## 2025-07-01 RX ORDER — DAPAGLIFLOZIN 10 MG/1
10 TABLET, FILM COATED ORAL DAILY
Qty: 90 TABLET | Refills: 0 | Status: SHIPPED | OUTPATIENT
Start: 2025-07-01

## 2025-07-01 NOTE — PROGRESS NOTES
"  Outpatient Rehab    Physical Therapy Visit    Patient Name: Tsering Carlos  MRN: [id number]  YOB: 1975  Encounter Date: 7/1/2025    Therapy Diagnosis:   Encounter Diagnoses   Name Primary?    Impaired range of motion of left knee Yes    Weakness of left quadriceps muscle     Impaired functional mobility and activity tolerance      Physician: Arjun Jean Baptiste MD    Physician Orders: Eval and Treat  Medical Diagnosis: Primary osteoarthritis of left knee  Surgical Diagnosis: Left TKA   Surgical Date: 6/9/2025  Days Since Last Surgery: 22    Visit # / Visits Authorized:  3 / 20  Insurance Authorization Period: 6/20/2025 to 12/31/2025  Date of Evaluation: 6/20/2025  Plan of Care Certification: 6/20/2025 to 8/30/2025      PT/PTA:     Number of PTA visits since last PT visit:   Time In: 0700   Time Out: 0756  Total Time (in minutes): 56   Total Billable Time (in minutes): 56     FOTO:  Intake Score:  %  Survey Score 2:  %  Survey Score 3:  %    Precautions:       Subjective   Feeling ok today, no complaints..  Pain reported as 0/10.      Objective            Treatment:     Nustep x 10 mins     Quad sets with towel  3x10  DKTC with PB  3x10  STRAIGHT LEG RAISE   3x10   SAQ  3 lbs 2x10 B  Supine heel slides  3x10   Bridges 3x10  Clams with red tb 2x10   Supine Hip ADD Ball 2 x 10      LAQ 5 lbs 3x10    TKE with ball 2x10    Seated hamstring stretch 3x30 secs B      Standing gastroc stretch 3x30 secs B  Standing hip 3 way 2x10 B - np  Step ups 2x10 B     Sit to stand from 24" box x 20  Mini squats 2x10 - np       Precor hip abduction 30 lbs  3x10   Shuttle leg press  25#  3x10    Time Entry(in minutes):  Therapeutic Exercise Time Entry: 56    Assessment & Plan   Assessment:    Evaluation/Treatment Tolerance: Patient tolerated treatment well    The patient will continue to benefit from skilled outpatient physical therapy in order to address the deficits listed in the problem list on the initial evaluation, " provide patient and family education, and maximize the patients level of independence in the home and community environments.     The patient's spiritual, cultural, and educational needs were considered, and the patient is agreeable to the plan of care and goals.           Plan:      Goals:   Active       Long Term        Patient will return to normal ADL, recreational, and work related activities with less pain and limitation.  (Progressing)       Start:  06/20/25    Expected End:  08/30/25            Patient to achieve full knee AROM   (Progressing)       Start:  06/20/25    Expected End:  08/30/25            Pain rating at best 1/10 to improve Quality of Life.  (Progressing)       Start:  06/20/25    Expected End:  08/30/25               Short Term        Recent signs and systems trend is improving in order to progress towards LTG's.  (Progressing)       Start:  06/20/25    Expected End:  08/30/25            Patient will be independent with HEP in order to further progress and return to maximal function.  (Progressing)       Start:  06/20/25    Expected End:  08/30/25            Patient will be able to correct postural deviations in sitting and standing, to decrease pain and promote postural awareness for injury prevention.  (Progressing)       Start:  06/20/25    Expected End:  08/30/25                Saeed Hobbs, PT

## 2025-07-02 ENCOUNTER — HOSPITAL ENCOUNTER (OUTPATIENT)
Dept: RADIOLOGY | Facility: HOSPITAL | Age: 50
Discharge: HOME | End: 2025-07-02
Attending: NURSE PRACTITIONER
Payer: MEDICAID

## 2025-07-02 ENCOUNTER — RESULTS FOLLOW-UP (OUTPATIENT)
Dept: FAMILY MEDICINE | Facility: CLINIC | Age: 50
End: 2025-07-02
Payer: MEDICAID

## 2025-07-02 ENCOUNTER — PATIENT MESSAGE (OUTPATIENT)
Dept: ORTHOPEDICS | Facility: CLINIC | Age: 50
End: 2025-07-02
Payer: MEDICAID

## 2025-07-02 VITALS — BODY MASS INDEX: 31.41 KG/M2 | WEIGHT: 184 LBS | HEIGHT: 64 IN

## 2025-07-02 DIAGNOSIS — Z12.31 SCREENING MAMMOGRAM, ENCOUNTER FOR: ICD-10-CM

## 2025-07-02 DIAGNOSIS — Z96.652 STATUS POST LEFT UNICOMPARTMENTAL KNEE REPLACEMENT: Primary | ICD-10-CM

## 2025-07-02 PROCEDURE — 77067 SCR MAMMO BI INCL CAD: CPT | Mod: TC,PO

## 2025-07-02 PROCEDURE — 77063 BREAST TOMOSYNTHESIS BI: CPT | Mod: 26,,, | Performed by: RADIOLOGY

## 2025-07-02 PROCEDURE — 77067 SCR MAMMO BI INCL CAD: CPT | Mod: 26,,, | Performed by: RADIOLOGY

## 2025-07-03 ENCOUNTER — CLINICAL SUPPORT (OUTPATIENT)
Dept: REHABILITATION | Facility: HOSPITAL | Age: 50
End: 2025-07-03
Payer: MEDICAID

## 2025-07-03 DIAGNOSIS — M62.81 WEAKNESS OF LEFT QUADRICEPS MUSCLE: ICD-10-CM

## 2025-07-03 DIAGNOSIS — M25.662 IMPAIRED RANGE OF MOTION OF LEFT KNEE: Primary | ICD-10-CM

## 2025-07-03 DIAGNOSIS — Z74.09 IMPAIRED FUNCTIONAL MOBILITY AND ACTIVITY TOLERANCE: ICD-10-CM

## 2025-07-03 PROCEDURE — 97110 THERAPEUTIC EXERCISES: CPT | Mod: PN

## 2025-07-03 NOTE — PROGRESS NOTES
"  Outpatient Rehab    Physical Therapy Visit    Patient Name: Tsering Carlos  MRN: [id number]  YOB: 1975  Encounter Date: 7/3/2025    Therapy Diagnosis:   Encounter Diagnoses   Name Primary?    Impaired range of motion of left knee Yes    Weakness of left quadriceps muscle     Impaired functional mobility and activity tolerance      Physician: Arjun Jean Baptiste MD    Physician Orders: Eval and Treat  Medical Diagnosis: Primary osteoarthritis of left knee  Surgical Diagnosis: Left TKA   Surgical Date: 6/9/2025  Days Since Last Surgery: 28    Visit # / Visits Authorized:  4 / 20  Insurance Authorization Period: 6/20/2025 to 12/31/2025  Date of Evaluation: 6/20/2025  Plan of Care Certification: 6/20/2025 to 8/30/2025      PT/PTA:     Number of PTA visits since last PT visit:   Time In: 1300   Time Out: 1353  Total Time (in minutes): 53   Total Billable Time (in minutes): 5653    FOTO:  Intake Score:  %  Survey Score 2:  %  Survey Score 3:  %    Precautions:       Subjective   Knee is getting better.         Objective            Treatment:     Nustep x 10 mins     Quad sets with towel  3x10  DKTC with PB  3x10  STRAIGHT LEG RAISE   3x10   SAQ  3 lbs 2x10 B  Supine heel slides  3x10   Bridges 3x10  Clams with red tb 2x10   Supine Hip ADD Ball 2 x 10      LAQ 5 lbs 3x10    TKE with ball 2x10    Seated hamstring stretch 3x30 secs B      Standing gastroc stretch 3x30 secs B  Standing hip 3 way 2x10 B - np  Step ups 2x10 B     Sit to stand from 24" box x 20  Mini squats 2x10 - np       Precor hip abduction 30 lbs  3x10   Shuttle leg press  25#  3x10    Time Entry(in minutes):  Therapeutic Exercise Time Entry: 53    Assessment & Plan   Assessment: Good Range of Motion today, continue to work on strength        The patient will continue to benefit from skilled outpatient physical therapy in order to address the deficits listed in the problem list on the initial evaluation, provide patient and family education, " and maximize the patients level of independence in the home and community environments.     The patient's spiritual, cultural, and educational needs were considered, and the patient is agreeable to the plan of care and goals.           Plan: Continue with TKA rehab    Goals:   Active       Long Term        Patient will return to normal ADL, recreational, and work related activities with less pain and limitation.  (Progressing)       Start:  06/20/25    Expected End:  08/30/25            Patient to achieve full knee AROM   (Progressing)       Start:  06/20/25    Expected End:  08/30/25            Pain rating at best 1/10 to improve Quality of Life.  (Progressing)       Start:  06/20/25    Expected End:  08/30/25               Short Term        Recent signs and systems trend is improving in order to progress towards LTG's.  (Progressing)       Start:  06/20/25    Expected End:  08/30/25            Patient will be independent with HEP in order to further progress and return to maximal function.  (Progressing)       Start:  06/20/25    Expected End:  08/30/25            Patient will be able to correct postural deviations in sitting and standing, to decrease pain and promote postural awareness for injury prevention.  (Progressing)       Start:  06/20/25    Expected End:  08/30/25                Lakhwinder Rashid, PT

## 2025-07-11 DIAGNOSIS — M17.12 PRIMARY OSTEOARTHRITIS OF LEFT KNEE: ICD-10-CM

## 2025-07-11 DIAGNOSIS — Z96.652 STATUS POST LEFT UNICOMPARTMENTAL KNEE REPLACEMENT: Primary | ICD-10-CM

## 2025-07-11 RX ORDER — OXYCODONE AND ACETAMINOPHEN 7.5; 325 MG/1; MG/1
1 TABLET ORAL EVERY 6 HOURS PRN
Qty: 28 TABLET | Refills: 0 | Status: CANCELLED | OUTPATIENT
Start: 2025-07-11

## 2025-07-16 RX ORDER — OXYCODONE AND ACETAMINOPHEN 5; 325 MG/1; MG/1
1 TABLET ORAL EVERY 8 HOURS PRN
Qty: 21 TABLET | Refills: 0 | Status: SHIPPED | OUTPATIENT
Start: 2025-07-16

## 2025-07-17 DIAGNOSIS — E78.1 HYPERTRIGLYCERIDEMIA: ICD-10-CM

## 2025-07-17 RX ORDER — FENOFIBRATE 160 MG/1
160 TABLET ORAL NIGHTLY
Qty: 30 TABLET | Refills: 0 | Status: SHIPPED | OUTPATIENT
Start: 2025-07-17

## 2025-07-29 ENCOUNTER — CLINICAL SUPPORT (OUTPATIENT)
Dept: REHABILITATION | Facility: HOSPITAL | Age: 50
End: 2025-07-29
Payer: MEDICAID

## 2025-07-29 DIAGNOSIS — M62.81 WEAKNESS OF LEFT QUADRICEPS MUSCLE: ICD-10-CM

## 2025-07-29 DIAGNOSIS — M25.662 IMPAIRED RANGE OF MOTION OF LEFT KNEE: Primary | ICD-10-CM

## 2025-07-29 DIAGNOSIS — Z74.09 IMPAIRED FUNCTIONAL MOBILITY AND ACTIVITY TOLERANCE: ICD-10-CM

## 2025-07-29 PROCEDURE — 97110 THERAPEUTIC EXERCISES: CPT | Mod: PN

## 2025-07-29 NOTE — PROGRESS NOTES
"  Outpatient Rehab    Physical Therapy Visit    Patient Name: Tsering Carlos  MRN: [id number]  YOB: 1975  Encounter Date: 7/29/2025    Therapy Diagnosis:   Encounter Diagnoses   Name Primary?    Impaired range of motion of left knee Yes    Weakness of left quadriceps muscle     Impaired functional mobility and activity tolerance      Physician: Arjun Jean Baptiste MD    Physician Orders: Eval and Treat  Medical Diagnosis: Primary osteoarthritis of left knee  Surgical Diagnosis: Left TKA   Surgical Date: 6/9/2025  Days Since Last Surgery: 50    Visit # / Visits Authorized:  5 / 20  Insurance Authorization Period: 6/20/2025 to 12/31/2025  Date of Evaluation: 6/20/2025  Plan of Care Certification: 6/20/2025 to 8/30/2025      PT/PTA:     Number of PTA visits since last PT visit:   Time In: 0700   Time Out: 0753  Total Time (in minutes): 53   Total Billable Time (in minutes): 5653    FOTO:  Intake Score:  %  Survey Score 2:  %  Survey Score 3:  %    Precautions:       Subjective   Has been sick, causing her to miss.  Was doing HEP.         Objective            Treatment:     Nustep x 10 mins     Quad sets with towel  3x10  DKTC with PB  3x10  STRAIGHT LEG RAISE   3x10   SAQ  3 lbs 2x10 B  Supine heel slides  3x10   Bridges 3x10  Clams with red tb 2x10   Supine Hip ADD Ball 2 x 10      LAQ 5 lbs 3x10    TKE with ball 2x10    Seated hamstring stretch 3x30 secs B      Standing gastroc stretch 3x30 secs B  Standing hip 3 way 2x10 B - np  Step ups 2x10 B     Sit to stand from 24" box x 20  Mini squats 2x10 - np       Precor hip abduction 30 lbs  3x10   Shuttle leg press  25#  3x10    Time Entry(in minutes):  Therapeutic Exercise Time Entry: 53    Assessment & Plan   Assessment: Motion is still good after time off, she has some quad weakness that needs continued work        The patient will continue to benefit from skilled outpatient physical therapy in order to address the deficits listed in the problem list on " the initial evaluation, provide patient and family education, and maximize the patients level of independence in the home and community environments.     The patient's spiritual, cultural, and educational needs were considered, and the patient is agreeable to the plan of care and goals.           Plan: Continue with TKA rehab    Goals:   Active       Long Term        Patient will return to normal ADL, recreational, and work related activities with less pain and limitation.  (Progressing)       Start:  06/20/25    Expected End:  08/30/25            Patient to achieve full knee AROM   (Progressing)       Start:  06/20/25    Expected End:  08/30/25            Pain rating at best 1/10 to improve Quality of Life.  (Progressing)       Start:  06/20/25    Expected End:  08/30/25               Short Term        Recent signs and systems trend is improving in order to progress towards LTG's.  (Progressing)       Start:  06/20/25    Expected End:  08/30/25            Patient will be independent with HEP in order to further progress and return to maximal function.  (Progressing)       Start:  06/20/25    Expected End:  08/30/25            Patient will be able to correct postural deviations in sitting and standing, to decrease pain and promote postural awareness for injury prevention.  (Progressing)       Start:  06/20/25    Expected End:  08/30/25                Lakhwinder Rashid, PT

## 2025-07-30 RX ORDER — HYDROCODONE BITARTRATE AND ACETAMINOPHEN 7.5; 325 MG/1; MG/1
1 TABLET ORAL EVERY 8 HOURS PRN
Qty: 28 TABLET | Refills: 0 | Status: SHIPPED | OUTPATIENT
Start: 2025-07-30

## 2025-07-30 RX ORDER — ONDANSETRON 4 MG/1
4 TABLET, FILM COATED ORAL EVERY 6 HOURS PRN
Qty: 10 TABLET | Refills: 0 | Status: SHIPPED | OUTPATIENT
Start: 2025-07-30

## 2025-07-31 ENCOUNTER — HOSPITAL ENCOUNTER (OUTPATIENT)
Dept: RADIOLOGY | Facility: HOSPITAL | Age: 50
Discharge: HOME | End: 2025-07-31
Payer: MEDICAID

## 2025-07-31 ENCOUNTER — CLINICAL SUPPORT (OUTPATIENT)
Dept: REHABILITATION | Facility: HOSPITAL | Age: 50
End: 2025-07-31
Payer: MEDICAID

## 2025-07-31 ENCOUNTER — OFFICE VISIT (OUTPATIENT)
Dept: ORTHOPEDICS | Facility: CLINIC | Age: 50
End: 2025-07-31
Payer: MEDICAID

## 2025-07-31 VITALS — HEIGHT: 64 IN | WEIGHT: 184.06 LBS | BODY MASS INDEX: 31.42 KG/M2

## 2025-07-31 DIAGNOSIS — M25.662 IMPAIRED RANGE OF MOTION OF LEFT KNEE: Primary | ICD-10-CM

## 2025-07-31 DIAGNOSIS — M62.81 WEAKNESS OF LEFT QUADRICEPS MUSCLE: ICD-10-CM

## 2025-07-31 DIAGNOSIS — Z74.09 IMPAIRED FUNCTIONAL MOBILITY AND ACTIVITY TOLERANCE: ICD-10-CM

## 2025-07-31 DIAGNOSIS — Z96.652 STATUS POST LEFT UNICOMPARTMENTAL KNEE REPLACEMENT: Primary | ICD-10-CM

## 2025-07-31 PROCEDURE — 3052F HG A1C>EQUAL 8.0%<EQUAL 9.0%: CPT | Mod: CPTII,,,

## 2025-07-31 PROCEDURE — 99999 PR PBB SHADOW E&M-EST. PATIENT-LVL III: CPT | Mod: PBBFAC,,,

## 2025-07-31 PROCEDURE — 73562 X-RAY EXAM OF KNEE 3: CPT | Mod: 26,LT,, | Performed by: RADIOLOGY

## 2025-07-31 PROCEDURE — 99213 OFFICE O/P EST LOW 20 MIN: CPT | Mod: PBBFAC,25,PN

## 2025-07-31 PROCEDURE — 1159F MED LIST DOCD IN RCRD: CPT | Mod: CPTII,,,

## 2025-07-31 PROCEDURE — 97110 THERAPEUTIC EXERCISES: CPT | Mod: PN

## 2025-07-31 PROCEDURE — 73562 X-RAY EXAM OF KNEE 3: CPT | Mod: TC,PN,LT

## 2025-07-31 PROCEDURE — 4010F ACE/ARB THERAPY RXD/TAKEN: CPT | Mod: CPTII,,,

## 2025-07-31 PROCEDURE — 99024 POSTOP FOLLOW-UP VISIT: CPT | Mod: ,,,

## 2025-07-31 RX ORDER — DAPAGLIFLOZIN 5 MG/1
5 TABLET, FILM COATED ORAL
COMMUNITY
Start: 2025-07-21

## 2025-07-31 NOTE — PROGRESS NOTES
[facility]  [address]  [place] [address]  Phone: [phone]   [phone]    Patient's PCP: Nery Lagos APRN-CNP  Referring Provider: No ref. provider found    Subjective:     Chief Complaint:   Chief Complaint   Patient presents with    Left Knee - Post-op Evaluation     Follow up for PO Left UNI Knee Replacement 6/9/25, states pain has improved. Just experiencing some aching pain in the evening after being on it throughout the day        Past Medical History:   Diagnosis Date    Anxiety     Breast cyst     4 mm cyst according to mammo, repeat test 5/2016    Diabetes mellitus, type 2     High calcium levels     Hypertension     Hypertriglyceridemia     Hypothyroidism     Slow to wake up after anesthesia     Umbilical hernia     since 2009, worse after giving birth       Past Surgical History:   Procedure Laterality Date    ADENOIDECTOMY  1985    HERNIA REPAIR  03/21/2016    INSERTION OF CONTRACEPTIVE CAPSULE  2012    TONSILLECTOMY  1985    TYMPANOSTOMY TUBE PLACEMENT  1985    UNICOMPARTMENTAL ARTHROPLASTY OF KNEE Left 6/9/2025    Procedure: ARTHROPLASTY, KNEE, UNICOMPARTMENTAL;  Surgeon: Arjun Jean Baptiste MD;  Location: [facility];  Service: Orthopedics;  Laterality: Left;  [place]       Current Outpatient Medications   Medication Sig    atorvastatin (LIPITOR) 20 MG tablet Take 1 tablet (20 mg total) by mouth every evening.    busPIRone (BUSPAR) 5 MG Tab TAKE ONE TABLET BY MOUTH TWICE DAILY    dapagliflozin propanediol (FARXIGA) 5 mg Tab tablet Take 5 mg by mouth.    dulaglutide (TRULICITY) 1.5 mg/0.5 mL pen injector Inject 1.5 mg into the skin every 7 days.    EScitalopram oxalate (LEXAPRO) 10 MG tablet Take 1 tablet (10 mg total) by mouth every evening.    estradioL (ESTRACE) 0.5 MG tablet Take 0.5 mg by mouth once daily.    fenofibrate 160 MG Tab TAKE ONE TABLET BY MOUTH EVERY EVENING    HYDROcodone-acetaminophen (NORCO) 7.5-325 mg per tablet Take 1 tablet by mouth every 8  (eight) hours as needed for Pain.    lancing device with lancets Kit 1 Units by Misc.(Non-Drug; Combo Route) route once daily.    levothyroxine (SYNTHROID) 125 MCG tablet Take 1 tablet (125 mcg total) by mouth before breakfast.    lisinopriL (PRINIVIL,ZESTRIL) 20 MG tablet Take 1 tablet (20 mg total) by mouth once daily.    metFORMIN (GLUCOPHAGE) 500 MG tablet Take 2 tablets (1,000 mg total) by mouth 2 (two) times daily with meals.    ondansetron (ZOFRAN) 4 MG tablet Take 1 tablet (4 mg total) by mouth every 6 (six) hours as needed for Nausea.    progesterone (PROMETRIUM) 200 MG capsule every evening.    aspirin (ECOTRIN) 81 MG EC tablet Take 1 tablet (81 mg total) by mouth every 12 (twelve) hours.    dapagliflozin propanediol (FARXIGA) 10 mg tablet Take 1 tablet (10 mg total) by mouth once daily. (Patient not taking: Reported on 7/31/2025)    gabapentin (NEURONTIN) 300 MG capsule Take 1 capsule (300 mg total) by mouth 2 (two) times daily.     No current facility-administered medications for this visit.       Review of patient's allergies indicates:  No Known Allergies    Family History   Problem Relation Name Age of Onset    Hypertension Mother      Stroke Mother      Heart disease Mother      Diabetes Mother      Diabetes Father      Stroke Father      Heart disease Father      Depression Father         Social History[1]    Prior to meeting with the patient I reviewed the medical chart in [facility]. This included reviewing the previous progress notes from our office, review of the patient's last appointment with their primary care provider, review of any visits to the emergency room, and review of any pain management appointments or procedures.    History of present illness: 50 y.o. female  about 7 weeks status post left unicompartmental knee replacement performed on June 9, 2025.  Here today for routine follow up.  She states that she has been attending formal physical therapy regularly which he states has been  helping.  She admits to minimal discomfort in her left knee that increases in severity at the end of the day although she states it is manageable at this time.  She has been able to perform her regular activities without any significant issues in her left knee.       Review of Systems:    Constitutional: Negative for chills, fever and weight loss.  HENT: Negative for congestion.    Eyes: Negative for discharge and redness.  Respiratory: Negative for cough and shortness of breath.    Cardiovascular: Negative for chest pain.  Gastrointestinal: Negative for nausea and vomiting.  Musculoskeletal: See HPI.  Skin: Negative for rash.  Neurological: Negative for headaches.  Endo/Heme/Allergies: Does not bruise/bleed easily.  Psychiatric/Behavioral: The patient is not nervous/anxious.    All other systems reviewed and are negative.       Objective:     Physical Examination:    Vital Signs: VITALS@    Body mass index is 31.6 kg/m².    This a well-developed, well nourished patient in no acute distress.  They are alert and oriented and cooperative to examination.    Left knee exam:  Well-healed incision to the anterior aspect of the left knee consistent with left knee arthroplasty.  Skin overlying the left knee is clean dry and intact.  No erythema or ecchymosis.  No signs or symptoms of infection.  Range of motion of the left knee 0-130 degrees.  Stable varus valgus stress.  Negative Homans.  Negative straight leg raise test.  Distally neurovascularly intact.  She is weight-bearing of the left lower extremity and ambulates without an assistive device.  She ambulates with a nonantalgic gait.      Pertinent New Results:      XRAY Report / Interpretation:   Three views of the left knee taken in clinic today reveal hardware consistent with left unicompartmental knee replacement in proper alignment without evidence of hardware failure or loosening.  Visualized soft tissues unremarkable.    Procedure/s:          Assessment:     1.  Status post left unicompartmental knee replacement      Plan:    Status post left unicompartmental knee replacement  -     X-Ray Knee 3 View Left        Follow up in about 2 months (around 9/30/2025).    Stable 7 weeks status post left unicompartmental knee replacement performed on June 9, 2025.  She is doing very well postoperatively.  I would like her to continue working with formal physical therapy interested in to a home exercise program.  I reassured her that her intermittent discomfort at the end of the day should continue to improve with time.  She is free to perform her regular activities as tolerated without any restrictions.  I would like her to follow up in 2 months to reassess her postoperative progress.    The patient and I had a thorough discussion today.  We discussed the working diagnosis as well as several other potential alternative diagnoses.  We discussed treatment options, both conservative and surgical.  Conservative treatment options would include things such as activity modifications, workplace modifications, a period of rest, oral versus topical over the counter and oral versus topical prescription anti-inflammatory medications, physical therapy / occupational therapy, splinting / bracing, immobilization, corticosteroid injections, and others.      Ramon Escobar PA-C      EMR Statement:  Please note that portions of this patient encounter record were imported from the patients electronic medical record and that others were dictated using voice recognition software. For these reasons grammatical errors, nonsensical language, and apparently contradictory statements may be present.  These should be disregarded or interpreted with respect to the context of the document.         [1]   Social History  Socioeconomic History    Marital status:    Occupational History    Occupation: business owner   Tobacco Use    Smoking status: Never     Passive exposure: Never    Smokeless tobacco: Never    Vaping Use    Vaping status: Never Used   Substance and Sexual Activity    Alcohol use: No    Drug use: No

## 2025-07-31 NOTE — PROGRESS NOTES
"  Outpatient Rehab    Physical Therapy Visit    Patient Name: Tsering Carlos  MRN: [id number]  YOB: 1975  Encounter Date: 7/31/2025    Therapy Diagnosis:   Encounter Diagnoses   Name Primary?    Impaired range of motion of left knee Yes    Weakness of left quadriceps muscle     Impaired functional mobility and activity tolerance      Physician: Arjun Jean Baptiste MD    Physician Orders: Eval and Treat  Medical Diagnosis: Primary osteoarthritis of left knee  Surgical Diagnosis: Left TKA   Surgical Date: 6/9/2025  Days Since Last Surgery: 52    Visit # / Visits Authorized:  6 / 20  Insurance Authorization Period: 6/20/2025 to 12/31/2025  Date of Evaluation: 6/20/2025  Plan of Care Certification: 6/20/2025 to 8/30/2025      PT/PTA:     Number of PTA visits since last PT visit:   Time In: 1300   Time Out: 1353  Total Time (in minutes): 53   Total Billable Time (in minutes): 5653    FOTO:  Intake Score:  %  Survey Score 2:  %  Survey Score 3:  %    Precautions:       Subjective   Is feeling better just some discomfort to lateral aspect.         Objective            Treatment:     Nustep x 10 mins     Quad sets with towel  3x10  DKTC with PB  3x10  STRAIGHT LEG RAISE   3x10   SAQ  5 lbs 2x10 B  Supine heel slides  3x10   Bridges 3x10  Clams with Green TB  2x10   Supine Hip ADD Ball 2 x 10      LAQ 5 lbs 3x10    TKE with ball 2x10    Seated hamstring stretch 3x30 secs B      Standing gastroc stretch 3x30 secs B  Standing hip 3 way 2x10 B - np  Step ups 2x10 B     Sit to stand from 24" box x 20  Mini squats 2x10 - np       Precor hip abduction 30 lbs  3x10   Shuttle leg press  25#  3x10    Time Entry(in minutes):  Therapeutic Exercise Time Entry: 53    Assessment & Plan   Assessment:    Evaluation/Treatment Tolerance: Patient tolerated treatment well    The patient will continue to benefit from skilled outpatient physical therapy in order to address the deficits listed in the problem list on the initial " evaluation, provide patient and family education, and maximize the patients level of independence in the home and community environments.     The patient's spiritual, cultural, and educational needs were considered, and the patient is agreeable to the plan of care and goals.           Plan: Continue with TKA rehab    Goals:   Active       Long Term        Patient will return to normal ADL, recreational, and work related activities with less pain and limitation.  (Progressing)       Start:  06/20/25    Expected End:  08/30/25            Patient to achieve full knee AROM   (Progressing)       Start:  06/20/25    Expected End:  08/30/25            Pain rating at best 1/10 to improve Quality of Life.  (Progressing)       Start:  06/20/25    Expected End:  08/30/25               Short Term        Recent signs and systems trend is improving in order to progress towards LTG's.  (Progressing)       Start:  06/20/25    Expected End:  08/30/25            Patient will be independent with HEP in order to further progress and return to maximal function.  (Progressing)       Start:  06/20/25    Expected End:  08/30/25            Patient will be able to correct postural deviations in sitting and standing, to decrease pain and promote postural awareness for injury prevention.  (Progressing)       Start:  06/20/25    Expected End:  08/30/25                Lakhwinder Rashid, PT

## 2025-08-05 ENCOUNTER — CLINICAL SUPPORT (OUTPATIENT)
Dept: REHABILITATION | Facility: HOSPITAL | Age: 50
End: 2025-08-05
Payer: MEDICAID

## 2025-08-05 DIAGNOSIS — Z74.09 IMPAIRED FUNCTIONAL MOBILITY AND ACTIVITY TOLERANCE: ICD-10-CM

## 2025-08-05 DIAGNOSIS — M25.662 IMPAIRED RANGE OF MOTION OF LEFT KNEE: Primary | ICD-10-CM

## 2025-08-05 DIAGNOSIS — M62.81 WEAKNESS OF LEFT QUADRICEPS MUSCLE: ICD-10-CM

## 2025-08-05 PROCEDURE — 97110 THERAPEUTIC EXERCISES: CPT | Mod: PN

## 2025-08-05 NOTE — PROGRESS NOTES
"  Outpatient Rehab    Physical Therapy Visit    Patient Name: Tsering Carlos  MRN: [id number]  YOB: 1975  Encounter Date: 8/5/2025    Therapy Diagnosis:   Encounter Diagnoses   Name Primary?    Impaired range of motion of left knee Yes    Weakness of left quadriceps muscle     Impaired functional mobility and activity tolerance      Physician: Arjun Jean Baptiste MD    Physician Orders: Eval and Treat  Medical Diagnosis: Primary osteoarthritis of left knee  Surgical Diagnosis: Left TKA   Surgical Date: 6/9/2025  Days Since Last Surgery: 57    Visit # / Visits Authorized:  7 / 20  Insurance Authorization Period: 6/20/2025 to 12/31/2025  Date of Evaluation: 6/20/2025  Plan of Care Certification: 6/20/2025 to 8/30/2025      PT/PTA:     Number of PTA visits since last PT visit:   Time In: 0700   Time Out: 0753  Total Time (in minutes): 53   Total Billable Time (in minutes): 5653    FOTO:  Intake Score:  %  Survey Score 2:  %  Survey Score 3:  %    Precautions:       Subjective   No new issues.         Objective            Treatment:     Nustep x 10 mins     Quad sets with towel  3x10  DKTC with PB  3x10  STRAIGHT LEG RAISE   3x10   SAQ  5 lbs 2x10 B  Supine heel slides  3x10   Bridges 3x10  Clams with Green TB  2x10   Supine Hip ADD Ball 2 x 10      LAQ 5 lbs 3x10    TKE with ball 2x10    Seated hamstring stretch 3x30 secs B      Standing gastroc stretch 3x30 secs B  Standing hip 3 way 2x10 B - np  Step ups 2x10 B     Sit to stand from 24" box x 20  Mini squats 2x10 - np       Precor hip abduction 30 lbs  3x10   Shuttle leg press  25#  3x10    Time Entry(in minutes):  Therapeutic Exercise Time Entry: 53    Assessment & Plan   Assessment: Continues to progress after her TKA       The patient will continue to benefit from skilled outpatient physical therapy in order to address the deficits listed in the problem list on the initial evaluation, provide patient and family education, and maximize the patients " level of independence in the home and community environments.     The patient's spiritual, cultural, and educational needs were considered, and the patient is agreeable to the plan of care and goals.           Plan: Continue with TKA rehab    Goals:   Active       Long Term        Patient will return to normal ADL, recreational, and work related activities with less pain and limitation.  (Progressing)       Start:  06/20/25    Expected End:  08/30/25            Patient to achieve full knee AROM   (Progressing)       Start:  06/20/25    Expected End:  08/30/25            Pain rating at best 1/10 to improve Quality of Life.  (Progressing)       Start:  06/20/25    Expected End:  08/30/25               Short Term        Recent signs and systems trend is improving in order to progress towards LTG's.  (Progressing)       Start:  06/20/25    Expected End:  08/30/25            Patient will be independent with HEP in order to further progress and return to maximal function.  (Progressing)       Start:  06/20/25    Expected End:  08/30/25            Patient will be able to correct postural deviations in sitting and standing, to decrease pain and promote postural awareness for injury prevention.  (Progressing)       Start:  06/20/25    Expected End:  08/30/25                Lakhwinedr Rashid, PT

## 2025-08-07 ENCOUNTER — CLINICAL SUPPORT (OUTPATIENT)
Dept: REHABILITATION | Facility: HOSPITAL | Age: 50
End: 2025-08-07
Payer: MEDICAID

## 2025-08-07 DIAGNOSIS — M62.81 WEAKNESS OF LEFT QUADRICEPS MUSCLE: ICD-10-CM

## 2025-08-07 DIAGNOSIS — M25.662 IMPAIRED RANGE OF MOTION OF LEFT KNEE: Primary | ICD-10-CM

## 2025-08-07 DIAGNOSIS — Z74.09 IMPAIRED FUNCTIONAL MOBILITY AND ACTIVITY TOLERANCE: ICD-10-CM

## 2025-08-07 PROCEDURE — 97110 THERAPEUTIC EXERCISES: CPT | Mod: PN

## 2025-08-09 NOTE — PROGRESS NOTES
"  Outpatient Rehab    Physical Therapy Visit    Patient Name: Tsering Carlos  MRN: [id number]  YOB: 1975  Encounter Date: 8/7/2025    Therapy Diagnosis:   Encounter Diagnoses   Name Primary?    Impaired range of motion of left knee Yes    Weakness of left quadriceps muscle     Impaired functional mobility and activity tolerance      Physician: Arjun Jean Baptiste MD    Physician Orders: Eval and Treat  Medical Diagnosis: Primary osteoarthritis of left knee  Surgical Diagnosis: Left TKA   Surgical Date: 6/9/2025  Days Since Last Surgery: 60    Visit # / Visits Authorized:  8 / 20  Insurance Authorization Period: 6/20/2025 to 12/31/2025  Date of Evaluation: 6/20/2025  Plan of Care Certification: 6/20/2025 to 8/30/2025      PT/PTA:     Number of PTA visits since last PT visit:   Time In: 1100   Time Out: 1153  Total Time (in minutes): 53   Total Billable Time (in minutes): 5653    FOTO:  Intake Score:  %  Survey Score 2:  %  Survey Score 3:  %    Precautions:       Subjective   Knee is sore today.         Objective            Treatment:  Therapeutic Exercise  TE 1: Nu Step  TE 2: QS Towel Roll  TE 3: SLR  TE 4: SAQ 3#  TE 5: Hip ABD GTB   Hip ADD ball  TE 6: Heel SLides  TE 7: LAQ 3#  TE 8: Sit to stand from Box  TE 9: Stand TKE  TE 10: Forward Step Ups    Nustep x 10 mins     Quad sets with towel  3x10  DKTC with PB  3x10  STRAIGHT LEG RAISE   3x10   SAQ  5 lbs 2x10 B  Supine heel slides  3x10   Bridges 3x10  Clams with Green TB  2x10   Supine Hip ADD Ball 2 x 10      LAQ 5 lbs 3x10    TKE with ball 2x10    Seated hamstring stretch 3x30 secs B      Standing gastroc stretch 3x30 secs B  Standing hip 3 way 2x10 B - np  Step ups 2x10 B     Sit to stand from 24" box x 20  Mini squats 2x10 - np       Precor hip abduction 30 lbs  3x10   Shuttle leg press  25#  3x10    Time Entry(in minutes):  Therapeutic Exercise Time Entry: 53    Assessment & Plan   Assessment:    Evaluation/Treatment Tolerance: Patient " tolerated treatment well    The patient will continue to benefit from skilled outpatient physical therapy in order to address the deficits listed in the problem list on the initial evaluation, provide patient and family education, and maximize the patients level of independence in the home and community environments.     The patient's spiritual, cultural, and educational needs were considered, and the patient is agreeable to the plan of care and goals.           Plan: Continue with TKA rehab    Goals:   Active       Long Term        Patient will return to normal ADL, recreational, and work related activities with less pain and limitation.  (Progressing)       Start:  06/20/25    Expected End:  08/30/25            Patient to achieve full knee AROM   (Progressing)       Start:  06/20/25    Expected End:  08/30/25            Pain rating at best 1/10 to improve Quality of Life.  (Progressing)       Start:  06/20/25    Expected End:  08/30/25               Short Term        Recent signs and systems trend is improving in order to progress towards LTG's.  (Progressing)       Start:  06/20/25    Expected End:  08/30/25            Patient will be independent with HEP in order to further progress and return to maximal function.  (Progressing)       Start:  06/20/25    Expected End:  08/30/25            Patient will be able to correct postural deviations in sitting and standing, to decrease pain and promote postural awareness for injury prevention.  (Progressing)       Start:  06/20/25    Expected End:  08/30/25                Lakhwinder Rashid PT

## 2025-08-12 ENCOUNTER — CLINICAL SUPPORT (OUTPATIENT)
Dept: REHABILITATION | Facility: HOSPITAL | Age: 50
End: 2025-08-12
Payer: MEDICAID

## 2025-08-12 DIAGNOSIS — Z74.09 IMPAIRED FUNCTIONAL MOBILITY AND ACTIVITY TOLERANCE: ICD-10-CM

## 2025-08-12 DIAGNOSIS — M62.81 WEAKNESS OF LEFT QUADRICEPS MUSCLE: ICD-10-CM

## 2025-08-12 DIAGNOSIS — M25.662 IMPAIRED RANGE OF MOTION OF LEFT KNEE: Primary | ICD-10-CM

## 2025-08-12 PROCEDURE — 97110 THERAPEUTIC EXERCISES: CPT | Mod: PN

## 2025-08-14 ENCOUNTER — CLINICAL SUPPORT (OUTPATIENT)
Dept: REHABILITATION | Facility: HOSPITAL | Age: 50
End: 2025-08-14
Payer: MEDICAID

## 2025-08-14 DIAGNOSIS — Z74.09 IMPAIRED FUNCTIONAL MOBILITY AND ACTIVITY TOLERANCE: ICD-10-CM

## 2025-08-14 DIAGNOSIS — M62.81 WEAKNESS OF LEFT QUADRICEPS MUSCLE: ICD-10-CM

## 2025-08-14 DIAGNOSIS — M25.662 IMPAIRED RANGE OF MOTION OF LEFT KNEE: Primary | ICD-10-CM

## 2025-08-14 PROCEDURE — 97110 THERAPEUTIC EXERCISES: CPT | Mod: PN

## 2025-08-19 ENCOUNTER — CLINICAL SUPPORT (OUTPATIENT)
Dept: REHABILITATION | Facility: HOSPITAL | Age: 50
End: 2025-08-19
Payer: MEDICAID

## 2025-08-19 DIAGNOSIS — M25.662 IMPAIRED RANGE OF MOTION OF LEFT KNEE: Primary | ICD-10-CM

## 2025-08-19 DIAGNOSIS — Z74.09 IMPAIRED FUNCTIONAL MOBILITY AND ACTIVITY TOLERANCE: ICD-10-CM

## 2025-08-19 DIAGNOSIS — M62.81 WEAKNESS OF LEFT QUADRICEPS MUSCLE: ICD-10-CM

## 2025-08-19 PROCEDURE — 97110 THERAPEUTIC EXERCISES: CPT | Mod: PN

## 2025-09-02 DIAGNOSIS — Z96.652 STATUS POST LEFT UNICOMPARTMENTAL KNEE REPLACEMENT: ICD-10-CM

## 2025-09-02 RX ORDER — ONDANSETRON 4 MG/1
4 TABLET, FILM COATED ORAL EVERY 6 HOURS PRN
Qty: 10 TABLET | Refills: 0 | Status: SHIPPED | OUTPATIENT
Start: 2025-09-02

## 2025-09-02 RX ORDER — HYDROCODONE BITARTRATE AND ACETAMINOPHEN 7.5; 325 MG/1; MG/1
1 TABLET ORAL EVERY 8 HOURS PRN
Qty: 28 TABLET | Refills: 0 | Status: SHIPPED | OUTPATIENT
Start: 2025-09-02

## 2025-09-03 ENCOUNTER — CLINICAL SUPPORT (OUTPATIENT)
Dept: REHABILITATION | Facility: HOSPITAL | Age: 50
End: 2025-09-03
Payer: MEDICAID

## 2025-09-03 DIAGNOSIS — Z74.09 IMPAIRED FUNCTIONAL MOBILITY AND ACTIVITY TOLERANCE: ICD-10-CM

## 2025-09-03 DIAGNOSIS — M25.662 IMPAIRED RANGE OF MOTION OF LEFT KNEE: Primary | ICD-10-CM

## 2025-09-03 DIAGNOSIS — M62.81 WEAKNESS OF LEFT QUADRICEPS MUSCLE: ICD-10-CM

## 2025-09-03 PROCEDURE — 97110 THERAPEUTIC EXERCISES: CPT | Mod: PN

## (undated) DEVICE — GLOVE SENSICARE PI ALOE 8.5

## (undated) DEVICE — ELECTRODE MEGADYNE RETURN DUAL

## (undated) DEVICE — INTERPULSE SET

## (undated) DEVICE — DRESSING GAUZE OIL EMUL 3X8

## (undated) DEVICE — SPONGE BULKEE II ABSRB 6X6.75

## (undated) DEVICE — SYS SURGIPHOR STRL IRRG

## (undated) DEVICE — GOWN TOGA SYS PEELWY ZIP 2 XL

## (undated) DEVICE — SUT STRATAFIX SPIRAL VIOLET

## (undated) DEVICE — GLOVE SENSICARE PI GRN 7

## (undated) DEVICE — MANIFOLD 4 PORT

## (undated) DEVICE — GLOVE SENSICARE PI ALOE 6

## (undated) DEVICE — KIT VIZADISC KNEE TRACKING

## (undated) DEVICE — PIN BONE 3.2X110MM
Type: IMPLANTABLE DEVICE | Site: KNEE | Status: NON-FUNCTIONAL
Removed: 2025-06-09

## (undated) DEVICE — PACK CUSTOM UNIV BASIN SLI

## (undated) DEVICE — PACK SIRUS BASIC V SURG STRL

## (undated) DEVICE — BNDG COFLEX FOAM LF2 ST 6X5YD

## (undated) DEVICE — SYR 50CC LL

## (undated) DEVICE — DECANTER FLUID TRNSF WHITE 9IN

## (undated) DEVICE — GLOVE SENSICARE PI GRN 6.5

## (undated) DEVICE — TUBING IRRIGATION RIO

## (undated) DEVICE — PADDING CAST SPECIALIST 6X4YD

## (undated) DEVICE — Device

## (undated) DEVICE — TAPE SILK 3IN

## (undated) DEVICE — SLEEVE SCD EXPRESS KNEE MEDIUM

## (undated) DEVICE — DRAPE U SPLIT SHEET 54X76IN

## (undated) DEVICE — GLOVE SENSICARE PI ALOE 6.5

## (undated) DEVICE — SPONGE LAP 18X18 PREWASHED

## (undated) DEVICE — BLADE TONGUE DEPRESSOR STRL

## (undated) DEVICE — GLOVE SENSICARE PI ALOE 7.5

## (undated) DEVICE — BLADE MAKO NARROW

## (undated) DEVICE — SOL NACL .9% 250ML INJ

## (undated) DEVICE — SUT MONOCRYL PLUS UD 3-0 27

## (undated) DEVICE — KIT DRAPE RIO ONE PIECE W/POCK

## (undated) DEVICE — KIT LEG POSITIONER DISPOSABLES

## (undated) DEVICE — SYS CLSR DERMABOND PRINEO 42CM

## (undated) DEVICE — APPLICATOR CHLORAPREP ORN 26ML

## (undated) DEVICE — BNDG COFLEX FOAM LF2 ST 4X5YD

## (undated) DEVICE — ALCOHOL RUBBING 70% ISO 4OZ

## (undated) DEVICE — DRAPE INCISE IOBAN 2 23X33IN

## (undated) DEVICE — DRAPE THREE-QTR REINF 53X77IN

## (undated) DEVICE — GLOVE SENSICARE PI ALOE 7

## (undated) DEVICE — TOGA FLYTE PEEL AWAY XLARGE

## (undated) DEVICE — SUT FIBERWIRE 2 38 IN TAPER

## (undated) DEVICE — PENCIL SMK EVAC CONNECTOR 10FT

## (undated) DEVICE — PIN FIXATION BONE 140X3.2MM
Type: IMPLANTABLE DEVICE | Site: KNEE | Status: NON-FUNCTIONAL
Removed: 2025-06-09

## (undated) DEVICE — MIXER BONE CEMENT

## (undated) DEVICE — DRAPE STERI INSTRUMENT 1018

## (undated) DEVICE — SOL NACL IRR 1000ML BTL

## (undated) DEVICE — BANDAGE ACE DOUBLE STER 6IN

## (undated) DEVICE — PADDING WYTEX UNDRCST 6INX4YD

## (undated) DEVICE — NDL SPINAL 18GX3.5 SPINOCAN

## (undated) DEVICE — BANDAGE ESMARK ELASTIC ST 6X9

## (undated) DEVICE — GOWN POLY REINF BRTH SLV LG

## (undated) DEVICE — SOL NACL IRR 3000ML

## (undated) DEVICE — STRAP OR TABLE 5IN X 72IN

## (undated) DEVICE — PAD DERMAPROX XL 22X14X.5IN

## (undated) DEVICE — WRAP KNEE ACCU THERM GEL PACK

## (undated) DEVICE — YANKAUER FLEX NO VENT HI CAP

## (undated) DEVICE — BLADE SAW SGTL 21.2X85.5X1.2

## (undated) DEVICE — COVER TABLE 44X90 STERILE

## (undated) DEVICE — GLOVE SENSICARE PI GRN 8.5

## (undated) DEVICE — PACK EXTREMITY ORTHOMAX

## (undated) DEVICE — BLADE SURG CARBON STEEL #10

## (undated) DEVICE — TOURNIQUET SB QC DP 34X4IN

## (undated) DEVICE — DRAPE INVISISHIELD TOWEL SMALL

## (undated) DEVICE — KIT TRIATHLON TIBIAL SIZER

## (undated) DEVICE — CATH URETHRAL RED 16FR

## (undated) DEVICE — SUT STRATAFIX PDS 1 CTX 18IN